# Patient Record
Sex: MALE | Race: WHITE | Employment: OTHER | URBAN - METROPOLITAN AREA
[De-identification: names, ages, dates, MRNs, and addresses within clinical notes are randomized per-mention and may not be internally consistent; named-entity substitution may affect disease eponyms.]

---

## 2017-03-22 ENCOUNTER — TELEPHONE (OUTPATIENT)
Dept: INTERNAL MEDICINE | Facility: OTHER | Age: 57
End: 2017-03-22

## 2017-03-22 NOTE — TELEPHONE ENCOUNTER
Left message to call back to see if he has a pcp somewhere else we need to get records from. Claudine De La Garza LPN

## 2017-03-24 ENCOUNTER — OFFICE VISIT (OUTPATIENT)
Dept: INTERNAL MEDICINE | Facility: OTHER | Age: 57
End: 2017-03-24
Attending: INTERNAL MEDICINE
Payer: MEDICARE

## 2017-03-24 VITALS
OXYGEN SATURATION: 98 % | HEIGHT: 68 IN | WEIGHT: 185 LBS | SYSTOLIC BLOOD PRESSURE: 120 MMHG | BODY MASS INDEX: 28.04 KG/M2 | HEART RATE: 70 BPM | TEMPERATURE: 97.4 F | DIASTOLIC BLOOD PRESSURE: 84 MMHG

## 2017-03-24 DIAGNOSIS — G89.29 CHRONIC RIGHT SHOULDER PAIN: Primary | ICD-10-CM

## 2017-03-24 DIAGNOSIS — M25.511 CHRONIC RIGHT SHOULDER PAIN: Primary | ICD-10-CM

## 2017-03-24 DIAGNOSIS — G89.29 CHRONIC BILATERAL BACK PAIN, UNSPECIFIED BACK LOCATION: ICD-10-CM

## 2017-03-24 DIAGNOSIS — M54.9 CHRONIC BILATERAL BACK PAIN, UNSPECIFIED BACK LOCATION: ICD-10-CM

## 2017-03-24 DIAGNOSIS — R69 DIAGNOSIS UNKNOWN: ICD-10-CM

## 2017-03-24 PROCEDURE — 99203 OFFICE O/P NEW LOW 30 MIN: CPT | Performed by: INTERNAL MEDICINE

## 2017-03-24 PROCEDURE — 99213 OFFICE O/P EST LOW 20 MIN: CPT

## 2017-03-24 ASSESSMENT — ANXIETY QUESTIONNAIRES
6. BECOMING EASILY ANNOYED OR IRRITABLE: NOT AT ALL
3. WORRYING TOO MUCH ABOUT DIFFERENT THINGS: MORE THAN HALF THE DAYS
1. FEELING NERVOUS, ANXIOUS, OR ON EDGE: SEVERAL DAYS
GAD7 TOTAL SCORE: 5
5. BEING SO RESTLESS THAT IT IS HARD TO SIT STILL: NOT AT ALL
7. FEELING AFRAID AS IF SOMETHING AWFUL MIGHT HAPPEN: NOT AT ALL
IF YOU CHECKED OFF ANY PROBLEMS ON THIS QUESTIONNAIRE, HOW DIFFICULT HAVE THESE PROBLEMS MADE IT FOR YOU TO DO YOUR WORK, TAKE CARE OF THINGS AT HOME, OR GET ALONG WITH OTHER PEOPLE: NOT DIFFICULT AT ALL
2. NOT BEING ABLE TO STOP OR CONTROL WORRYING: NOT AT ALL

## 2017-03-24 ASSESSMENT — PAIN SCALES - GENERAL: PAINLEVEL: MODERATE PAIN (4)

## 2017-03-24 ASSESSMENT — PATIENT HEALTH QUESTIONNAIRE - PHQ9: 5. POOR APPETITE OR OVEREATING: MORE THAN HALF THE DAYS

## 2017-03-24 NOTE — MR AVS SNAPSHOT
After Visit Summary   3/24/2017    Ramo Berry    MRN: [id number]           Patient Information     Date Of Birth          1960        Visit Information        Provider Department      3/24/2017 9:15 AM Koffi Jean-Baptiste, DO [facility]        Today's Diagnoses     Chronic right shoulder pain    -  1    Diagnosis unknown           Follow-ups after your visit        Additional Services     ORTHOPEDICS ADULT REFERRAL       Your provider has referred you to: Right shoulder pain   Second opinion      Please be aware that coverage of these services is subject to the terms and limitations of your health insurance plan.  Call member services at your health plan with any benefit or coverage questions.      Please bring the following to your appointment:    >>   Any x-rays, CTs or MRIs which have been performed.  Contact the facility where they were done to arrange for  prior to your scheduled appointment.    >>   List of current medications   >>   This referral request   >>   Any documents/labs given to you for this referral                  Who to contact     If you have questions or need follow up information about today's clinic visit or your schedule please contact [facility] directly at [phone].  Normal or non-critical lab and imaging results will be communicated to you by [employer], letter or phone within 4 business days after the clinic has received the results. If you do not hear from us within 7 days, please contact the clinic through [employer] or phone. If you have a critical or abnormal lab result, we will notify you by phone as soon as possible.  Submit refill requests through [employer] or call your pharmacy and they will forward the refill request to us. Please allow 3 business days for your refill to be completed.          Additional Information About Your Visit        [employer] Information     [employer] lets you send messages to your doctor, view your  "test results, renew your prescriptions, schedule appointments and more. To sign up, go to [email or web address] . Click on \"Log in\" on the left side of the screen, which will take you to the Welcome page. Then click on \"Sign up Now\" on the right side of the page.     You will be asked to enter the access code listed below, as well as some personal information. Please follow the directions to create your username and password.     Your access code is: [email or web address]  Expires: 2017  9:21 AM     Your access code will  in 90 days. If you need help or a new code, please call your [place] clinic or [phone].        Care EveryWhere ID     This is your Care EveryWhere ID. This could be used by other organizations to access your [place] medical records  [id number]        Your Vitals Were     Pulse Temperature Height Pulse Oximetry BMI (Body Mass Index)       70 97.4  F (36.3  C) (Tympanic) 5' 8\" (1.727 m) 98% 28.13 kg/m2        Blood Pressure from Last 3 Encounters:   17 120/84   16 170/90   16 137/75    Weight from Last 3 Encounters:   17 185 lb (83.9 kg)   16 175 lb (79.4 kg)   16 170 lb (77.1 kg)              We Performed the Following     ORTHOPEDICS ADULT REFERRAL          Today's Medication Changes          These changes are accurate as of: 3/24/17  9:22 AM.  If you have any questions, ask your nurse or doctor.               These medicines have changed or have updated prescriptions.        Dose/Directions    omeprazole 20 MG CR capsule   Commonly known as:  priLOSEC   This may have changed:  See the new instructions.   Used for:  Diagnosis unknown   Changed by:  Koffi Jean-Baptiste, DO        TAKE ONE CAPSULE BY MOUTH EVERY DAY BEFORE A MEAL.   Quantity:  90 capsule   Refills:  3            Where to get your medicines      These medications were sent to [id number] - [place], [place] - [address]  [id number] , [place] [address]     Phone:  " [phone]     omeprazole 20 MG CR capsule                Primary Care Provider    None       No address on file        Thank you!     Thank you for choosing [facility]  for your care. Our goal is always to provide you with excellent care. Hearing back from our patients is one way we can continue to improve our services. Please take a few minutes to complete the written survey that you may receive in the mail after your visit with us. Thank you!             Your Updated Medication List - Protect others around you: Learn how to safely use, store and throw away your medicines at www.disposemymeds.org.          This list is accurate as of: 3/24/17  9:22 AM.  Always use your most recent med list.                   Brand Name Dispense Instructions for use    AMLODIPINE BESYLATE PO      Take 5 mg by mouth 2 times daily       aspirin 325 MG tablet      Take 1 tablet by mouth every 4 hours as needed. For pain       CHLORTHALIDONE PO      Take 12.5 mg by mouth daily       lisinopril 40 MG tablet    PRINIVIL/ZESTRIL     Take 1 tablet by mouth daily.       METOPROLOL TARTRATE PO      Take 50 mg by mouth 2 times daily       omeprazole 20 MG CR capsule    priLOSEC    90 capsule    TAKE ONE CAPSULE BY MOUTH EVERY DAY BEFORE A MEAL.

## 2017-03-24 NOTE — PROGRESS NOTES
SUBJECTIVE:  Ramo Berry is a 57 year old male with pmh of HTN and chronic pain who presents today for establishment of care.  Patient reports ongoing right shoulder pain following right shoulder surgery and ongoing low back pain due to his neuro-stimulotor.  He also would like to see orthopedics for a second opinion due to ongoing pain in the right shoulder.  Patient was informed that I would not manage chronic pain with narcotics and the fact that his Marijuana use in the past would further complicate his situation moving forward.  We discussed [facility] pain management program which he declined today.  Patient voiced frustration as he has not found a provider since leaving [facility] who would prescribe medications he feels he truly needs.        Past Medical History:   Diagnosis Date     Anxiety state, unspecified 09/20/2012     Back Pain 09/20/2012     Cervical disc disease 09/20/2012     Chronic pain syndrome 09/20/2012     Essential hypertension, benign 09/20/2012         Past Surgical History:   Procedure Laterality Date     C6 C7 cervical fusion       cataract extraction and lens implantation      Bilateral      cystoscopy with biopsies      hematuria     fusion L5 S1       L4 L5 spinal fusion       MOUTH SURGERY       ORTHOPEDIC SURGERY Right 08/09/2016    rotator cuff surgery         No Known Allergies    Medications:  Current Outpatient Prescriptions   Medication Sig Dispense Refill     omeprazole (PRILOSEC) 20 MG CR capsule TAKE ONE CAPSULE BY MOUTH EVERY DAY BEFORE A MEAL. 90 capsule 3     METOPROLOL TARTRATE PO Take 50 mg by mouth 2 times daily       CHLORTHALIDONE PO Take 12.5 mg by mouth daily       AMLODIPINE BESYLATE PO Take 5 mg by mouth 2 times daily       aspirin 325 MG tablet Take 1 tablet by mouth every 4 hours as needed. For pain       lisinopril (PRINIVIL,ZESTRIL) 40 MG tablet Take 1 tablet by mouth daily.       [DISCONTINUED] omeprazole (PRILOSEC) 20 MG capsule TAKE ONE CAPSULE BY MOUTH  "EVERY DAY BEFORE A MEAL. (Patient taking differently: TAKE TWO CAPSULE BY MOUTH EVERY DAY BEFORE A MEAL.) 30 capsule 0       Family History   Problem Relation Age of Onset     Other - See Comments Father      back surgery      CEREBROVASCULAR DISEASE Father      CVA (cause of death)     Other - See Comments Sister      back problems     CANCER Other      Maternal side     DIABETES Other      Other - See Comments Sister      lumbar fusion       Social History   Substance Use Topics     Smoking status: Current Every Day Smoker     Packs/day: 1.00     Years: 35.00     Types: Cigarettes     Smokeless tobacco: Not on file      Comment: Tried to Quit: Yes; Passive Exposure: Yes; Longest Tobacco Free: 2 years      Alcohol use No      Comment: Rarely      Social History     Social History Narrative        Review Of Systems  ROS not completed.      OBJECTIVE:  /84 (BP Location: Right arm, Patient Position: Supine, Cuff Size: Adult Large)  Pulse 70  Temp 97.4  F (36.3  C) (Tympanic)  Ht 5' 8\" (1.727 m)  Wt 185 lb (83.9 kg)  SpO2 98%  BMI 28.13 kg/m2  Body mass index is 28.13 kg/(m^2).   NO EXAM COMPLETED TODAY  Psych:  Patient was cooperative but disappointed in that I would not prescribe pain medications today.       ASSESSMENT/PLAN:  Pt is a 57 year old male here to establish care    Ramo was seen today for establish care and *_* health care directive *_*.    Diagnoses and all orders for this visit:    Chronic right shoulder pain  -     Cancel: XR Shoulder Right 2 Views; Future  -     Cancel: XR Shoulder Right 2 Views  -     ORTHOPEDICS ADULT REFERRAL:  Patient requests a second opinion regarding previous right shoulder surgery as has ongoing right shoulder pain.      Diagnosis unknown  -     omeprazole (PRILOSEC) 20 MG CR capsule; TAKE ONE CAPSULE BY MOUTH EVERY DAY BEFORE A MEAL.    Chronic bilateral back pain, unspecified back location    I informed the patient today that I would see him for medical concerns " but I am not willing to prescribe narcotics.  He states he is uncertain if he will follow with me.  He requested Orthopedics referral and refill of Omeprazole as above.        Return to clinic per patient.      30 minutes of time was spent reviewing this patients records, coordinating care and discussing with the patient today.  No exam was completed, referrals were scheduled and Omeprazole was refilled today.      Koffi Jean-Baptiste D.O.

## 2017-03-25 ASSESSMENT — PATIENT HEALTH QUESTIONNAIRE - PHQ9: SUM OF ALL RESPONSES TO PHQ QUESTIONS 1-9: 5

## 2017-03-25 ASSESSMENT — ANXIETY QUESTIONNAIRES: GAD7 TOTAL SCORE: 5

## 2017-03-27 DIAGNOSIS — M25.511 ACUTE PAIN OF RIGHT SHOULDER: Primary | ICD-10-CM

## 2017-03-30 ENCOUNTER — OFFICE VISIT (OUTPATIENT)
Dept: ORTHOPEDICS | Facility: OTHER | Age: 57
End: 2017-03-30
Attending: ORTHOPAEDIC SURGERY
Payer: MEDICARE

## 2017-03-30 ENCOUNTER — APPOINTMENT (OUTPATIENT)
Dept: GENERAL RADIOLOGY | Facility: OTHER | Age: 57
End: 2017-03-30
Attending: ORTHOPAEDIC SURGERY
Payer: MEDICARE

## 2017-03-30 VITALS
HEIGHT: 70 IN | OXYGEN SATURATION: 97 % | TEMPERATURE: 97.7 F | SYSTOLIC BLOOD PRESSURE: 150 MMHG | WEIGHT: 193 LBS | HEART RATE: 73 BPM | DIASTOLIC BLOOD PRESSURE: 98 MMHG | BODY MASS INDEX: 27.63 KG/M2

## 2017-03-30 DIAGNOSIS — M25.511 CHRONIC RIGHT SHOULDER PAIN: ICD-10-CM

## 2017-03-30 DIAGNOSIS — G89.29 CHRONIC RIGHT SHOULDER PAIN: ICD-10-CM

## 2017-03-30 PROCEDURE — 73030 X-RAY EXAM OF SHOULDER: CPT | Mod: TC,RT

## 2017-03-30 PROCEDURE — 99203 OFFICE O/P NEW LOW 30 MIN: CPT | Performed by: ORTHOPAEDIC SURGERY

## 2017-03-30 PROCEDURE — 72050 X-RAY EXAM NECK SPINE 4/5VWS: CPT | Mod: TC

## 2017-03-30 PROCEDURE — 99212 OFFICE O/P EST SF 10 MIN: CPT

## 2017-03-30 RX ORDER — ACETAMINOPHEN 500 MG
500 TABLET ORAL
COMMUNITY
Start: 2016-03-08 | End: 2017-05-08

## 2017-03-30 NOTE — NURSING NOTE
"No chief complaint on file.      Initial BP (!) 150/98 (BP Location: Left arm, Cuff Size: Adult Regular)  Pulse 73  Temp 97.7  F (36.5  C) (Tympanic)  Ht 5' 10\" (1.778 m)  Wt 193 lb (87.5 kg)  SpO2 97%  BMI 27.69 kg/m2 Estimated body mass index is 27.69 kg/(m^2) as calculated from the following:    Height as of this encounter: 5' 10\" (1.778 m).    Weight as of this encounter: 193 lb (87.5 kg).  Medication Reconciliation: complete    "

## 2017-03-30 NOTE — PROGRESS NOTES
SUBJECTIVE:                                                    Ramo Berry is a 57 year old male who presents to clinic today for the following health issues:      Musculoskeletal problem/pain      Duration: 1 year    Description  Location: Right shoulder    Intensity:  moderate, 5/10    Accompanying signs and symptoms: radiation of pain to hand and weakness of arm/hand    History  Previous similar problem: no   Previous evaluation:  MRI and Surgery last year    Precipitating or alleviating factors:  Trauma or overuse: no   Aggravating factors include: using it    Therapies tried and outcome: rest/inactivity, ice, stretching, exercises and NSAID - Aspirin               Problem list and histories reviewed & adjusted, as indicated.  Additional history: none    Patient Active Problem List   Diagnosis     Advanced care planning/counseling discussion     Past Surgical History:   Procedure Laterality Date     C6 C7 cervical fusion       cataract extraction and lens implantation      Bilateral      cystoscopy with biopsies      hematuria     fusion L5 S1       L4 L5 spinal fusion       MOUTH SURGERY       ORTHOPEDIC SURGERY Right 08/09/2016    rotator cuff surgery        Social History   Substance Use Topics     Smoking status: Current Every Day Smoker     Packs/day: 1.00     Years: 35.00     Types: Cigarettes     Smokeless tobacco: Not on file      Comment: Tried to Quit: Yes; Passive Exposure: Yes; Longest Tobacco Free: 2 years      Alcohol use No      Comment: Rarely      Family History   Problem Relation Age of Onset     Other - See Comments Father      back surgery      CEREBROVASCULAR DISEASE Father      CVA (cause of death)     Other - See Comments Sister      back problems     CANCER Other      Maternal side     DIABETES Other      Other - See Comments Sister      lumbar fusion         Current Outpatient Prescriptions   Medication Sig Dispense Refill     acetaminophen (TYLENOL) 500 MG tablet Take 500 mg by  mouth       omeprazole (PRILOSEC) 20 MG CR capsule TAKE ONE CAPSULE BY MOUTH EVERY DAY BEFORE A MEAL. 90 capsule 3     METOPROLOL TARTRATE PO Take 50 mg by mouth 2 times daily       CHLORTHALIDONE PO Take 12.5 mg by mouth daily       AMLODIPINE BESYLATE PO Take 5 mg by mouth 2 times daily       aspirin 325 MG tablet Take 1 tablet by mouth every 4 hours as needed. For pain       lisinopril (PRINIVIL,ZESTRIL) 40 MG tablet Take 1 tablet by mouth daily.       No Known Allergies    Reviewed and updated as needed this visit by clinical staff  Tobacco  Allergies  Meds       Reviewed and updated as needed this visit by Provider           Chief complaint: Pain in the right shoulder that radiates down into the upper arm and forearm.    History chief complaint: Patient's had a long history of pain in the right upper extremity.  Approximate 4 years ago he underwent a C6-C7 fusion procedure through an anterior approach for neck pain and numbness and weakness in the right upper extremity.  The procedure largely relieved his symptoms but not completely.  Proximally a year ago he again experiencing pain in the anterior aspect of the upper arm and shoulder as well as having radicular symptoms along the posterior aspect of his arm and down into the radial aspect of the forearm.  The pain was aggravated by any significant physical exertion with that right arm.  [facility] who felt that his symptoms were largely related to shoulder pathology, obtain the MRI in June 2016, that demonstrated widening of the rotator interval and partial tearing of the long head of the biceps tendon.  He underwent operative procedure in August 2 repair the rotator interval and to repair the biceps origin.  The patient states he never had relief of his discomfort and did not feel he was getting his concerns are addressed and decided to seek second opinion.  He presents today for orthopedic evaluation.    Past medical  history: Significant for hypertension and heartburn.    Past surgical history: L4-5 fusion, with revision surgery performed approximately 2 years later.  C6-7 fusion.  Right shoulder arthroscopically assisted rotator interval repair with biceps origin repair.    Social history: Lives locally, is retired, admits to 1 pack per day smoking, and occasional use of cannabis    Family history family history is reviewed and included in his Epic chart and includes cardiovascular disease, cancer, and diabetes    Review of systems: General no fever chills weight loss or constitutional symptoms    HEENT: Neck pain with range of motion otherwise negative    Chest: Negative    Cardiovascular: No recent changes    Abdomen: No change in bowel or bladder habits    : Negative    Neurologic: Numbness and tingling in the right upper arm over the deltoid triceps and extensor area of the forearm    Orthopedic: Chronic shoulder and right arm pain and chronic low back pain without radiculopathy    Physical exam: Apparent that the patient is a alert and oriented adult male that appears in no apparent distress.  He does appear tired    HEENT: Global decrease in range of motion of the cervical spine by approximately 10  in each motion plane, negative Spurling's exam    Chest: Normal excursion mild tenderness over the upper thoracic spine    Cardiovascular: No murmurs, regular rate and rhythm, no venous distention    Abdomen: Soft and nontender no organomegaly    Musculoskeletal: Sensation is diminished in the C5-C6 dermatome of the right upper extremity but normal in the left.  Patient has a full active range of motion of both left and right shoulder, elbow, wrist and small joints of the hand.  Sensation is intact in all these areas.  Motor strength is grade 5 out of 5 in all planes in all muscle groups in the left upper extremity.  Patient has grade 4+ strength in the right upper extremity in external rotation and forward flexion above 70   otherwise 5 over 5 in all planes.  Reflexes are normal and equal bilaterally.  Examination of lumbar spine demonstrates a well-healed deep surgical scar with the palpable object just to the right of the scar which is most likely his bone stimulator implanted at the time of his last surgery.  He has normal strength in both legs in the quadriceps, hamstrings, calves and anterior compartment muscles.  Foot flexion and extension are normal he walks with a normal gait has normal sensation in his feet and legs.    X-rays and MRI.  Cervical spine series and shoulder series were obtained today cervical spine series demonstrates fusion at C6-7 that appears to be complete in stable there is slight diminished space in the neural foramen on the left compared to the right CERVICAL disc spaces are diminished.    Right shoulder series appears essentially normal.    MRI from June 2016 is reviewed and this demonstrates widening of the rotator interval flattening of the biceps tendon fluid around the biceps tendon consistent with his preoperative diagnosis    Assessment and plan: The assessment is that he has a mixed component causing pain in his right upper extremity.  I believe some of this is coming from his shoulder as well as from the cervical spine a lengthy discussion was held regarding natural history of spinal fusions and the potential for additional degenerative disc disease at the segment above and below a previous fusion, as well as a strong family history of spondyloarthropathy.  He did have an MRI obtained of his right shoulder performed at [facility] on December 16 of 2016 and we are going to request a copy of this disc.  To review at his next visit, he will follow-up as soon as this is obtained.

## 2017-03-30 NOTE — MR AVS SNAPSHOT
"              After Visit Summary   3/30/2017    Ramo Berry    MRN: [id number]           Patient Information     Date Of Birth          1960        Visit Information        Provider Department      3/30/2017 10:45 AM Talha Farrell, DO  ORTHOPEDICS        Today's Diagnoses     Chronic right shoulder pain           Follow-ups after your visit        Who to contact     If you have questions or need follow up information about today's clinic visit or your schedule please contact  ORTHOPEDICS directly at [phone].  Normal or non-critical lab and imaging results will be communicated to you by MyChart, letter or phone within 4 business days after the clinic has received the results. If you do not hear from us within 7 days, please contact the clinic through [employer] or phone. If you have a critical or abnormal lab result, we will notify you by phone as soon as possible.  Submit refill requests through [employer] or call your pharmacy and they will forward the refill request to us. Please allow 3 business days for your refill to be completed.          Additional Information About Your Visit        MyChart Information     [employer] lets you send messages to your doctor, view your test results, renew your prescriptions, schedule appointments and more. To sign up, go to [email or web address] . Click on \"Log in\" on the left side of the screen, which will take you to the Welcome page. Then click on \"Sign up Now\" on the right side of the page.     You will be asked to enter the access code listed below, as well as some personal information. Please follow the directions to create your username and password.     Your access code is: [email or web address]  Expires: 2017  9:21 AM     Your access code will  in 90 days. If you need help or a new code, please call your [place] clinic or [phone].        Care EveryWhere ID     This is your Care EveryWhere ID. This could be used by other organizations to access your " "[place] medical records  [id number]        Your Vitals Were     Pulse Temperature Height Pulse Oximetry BMI (Body Mass Index)       73 97.7  F (36.5  C) (Tympanic) 5' 10\" (1.778 m) 97% 27.69 kg/m2        Blood Pressure from Last 3 Encounters:   03/30/17 (!) 150/98   03/24/17 120/84   12/07/16 170/90    Weight from Last 3 Encounters:   03/30/17 193 lb (87.5 kg)   03/24/17 185 lb (83.9 kg)   12/07/16 175 lb (79.4 kg)              We Performed the Following     XR CERVICAL SPINE G/E 4 VIEWS (Clinic Performed)        Primary Care Provider    None       No address on file        Thank you!     Thank you for choosing  ORTHOPEDICS  for your care. Our goal is always to provide you with excellent care. Hearing back from our patients is one way we can continue to improve our services. Please take a few minutes to complete the written survey that you may receive in the mail after your visit with us. Thank you!             Your Updated Medication List - Protect others around you: Learn how to safely use, store and throw away your medicines at www.disposemymeds.org.          This list is accurate as of: 3/30/17 11:54 AM.  Always use your most recent med list.                   Brand Name Dispense Instructions for use    acetaminophen 500 MG tablet    TYLENOL     Take 500 mg by mouth       AMLODIPINE BESYLATE PO      Take 5 mg by mouth 2 times daily       aspirin 325 MG tablet      Take 1 tablet by mouth every 4 hours as needed. For pain       CHLORTHALIDONE PO      Take 12.5 mg by mouth daily       lisinopril 40 MG tablet    PRINIVIL/ZESTRIL     Take 1 tablet by mouth daily.       METOPROLOL TARTRATE PO      Take 50 mg by mouth 2 times daily       omeprazole 20 MG CR capsule    priLOSEC    90 capsule    TAKE ONE CAPSULE BY MOUTH EVERY DAY BEFORE A MEAL.         "

## 2017-04-06 ENCOUNTER — OFFICE VISIT (OUTPATIENT)
Dept: ORTHOPEDICS | Facility: OTHER | Age: 57
End: 2017-04-06
Attending: ORTHOPAEDIC SURGERY
Payer: MEDICARE

## 2017-04-06 VITALS
DIASTOLIC BLOOD PRESSURE: 68 MMHG | TEMPERATURE: 97.6 F | SYSTOLIC BLOOD PRESSURE: 138 MMHG | WEIGHT: 185 LBS | OXYGEN SATURATION: 98 % | HEIGHT: 70 IN | RESPIRATION RATE: 18 BRPM | BODY MASS INDEX: 26.48 KG/M2 | HEART RATE: 54 BPM

## 2017-04-06 DIAGNOSIS — G89.29 CHRONIC RIGHT SHOULDER PAIN: Primary | ICD-10-CM

## 2017-04-06 DIAGNOSIS — M25.511 CHRONIC RIGHT SHOULDER PAIN: Primary | ICD-10-CM

## 2017-04-06 PROCEDURE — 99212 OFFICE O/P EST SF 10 MIN: CPT

## 2017-04-06 PROCEDURE — 99212 OFFICE O/P EST SF 10 MIN: CPT | Performed by: ORTHOPAEDIC SURGERY

## 2017-04-06 ASSESSMENT — PAIN SCALES - GENERAL
PAINLEVEL: MODERATE PAIN (4)
PAINLEVEL: MODERATE PAIN (5)

## 2017-04-06 NOTE — NURSING NOTE
"Chief Complaint   Patient presents with     RECHECK     Right shoulder follow up. Waiting on disk.       Initial /86 (BP Location: Left arm, Patient Position: Chair, Cuff Size: Adult Regular)  Pulse 63  Temp 97.6  F (36.4  C) (Tympanic)  Ht 5' 10\" (1.778 m)  Wt 185 lb (83.9 kg)  SpO2 95%  BMI 26.54 kg/m2 Estimated body mass index is 26.54 kg/(m^2) as calculated from the following:    Height as of this encounter: 5' 10\" (1.778 m).    Weight as of this encounter: 185 lb (83.9 kg).  Medication Reconciliation: complete   Kaylee Vizcarra LPN      "

## 2017-04-06 NOTE — PROGRESS NOTES
"Patient presents today for review of an MRI that was obtained in [facility] orthopedic clinic on 16th of December 2016.  The patient's shoulder remains painful, and weak, and difficult for him to perform daily tasks with.    MRI review: MRI review condensed demonstrates considerable amount of fluid in the glenohumeral joint, there appears to be at least a partial tear of the subscapularis tendon, biceps tendon is markedly attenuated and the rotator cuff interval remains quite widened.  The acromioclavicular joint and the undersurface of the acromion had been resected.    Assessment and plan I reviewed the MRI with the patient, we believe it would be in his best interest to have the arthroscopic exam of the shoulder and repair the torn tendons and most likely perform a biceps tenodesis and rotator interval should also be closed.  Technical aspects of procedure usually expected recovery time potential complications, and activity level after the surgery were discussed he accepts the risks and desires of procedure be performed surgery date was selected./68 (BP Location: Left arm, Patient Position: Chair, Cuff Size: Adult Regular)  Pulse 54  Temp 97.6  F (36.4  C)  Resp 18  Ht 5' 10\" (1.778 m)  Wt 185 lb (83.9 kg)  SpO2 98%  BMI 26.54 kg/m2  "

## 2017-04-06 NOTE — MR AVS SNAPSHOT
"              After Visit Summary   4/6/2017    Ramo Berry    MRN: [id number]           Patient Information     Date Of Birth          1960        Visit Information        Provider Department      4/6/2017 3:15 PM Talha Farrell DO  ORTHOPEDICS         Follow-ups after your visit        Your next 10 appointments already scheduled     Apr 12, 2017 10:30 AM CDT   (Arrive by 10:15 AM)   Pre-Op physical with TAVIA Wake[place]   [facility] [place] ([facility] [place] [facility])    [address]  [place] [id number] [phone]            May 08, 2017 10:30 AM CDT   (Arrive by 10:15 AM)   Post Op with Talha Farrell DO    ORTHOPEDICS ([facility] [place] [facility])    [address]  [place] [id number] [phone]              Who to contact     If you have questions or need follow up information about today's clinic visit or your schedule please contact  ORTHOPEDICS directly at [phone].  Normal or non-critical lab and imaging results will be communicated to you by TapInkohart, letter or phone within 4 business days after the clinic has received the results. If you do not hear from us within 7 days, please contact the clinic through 3Jamt or phone. If you have a critical or abnormal lab result, we will notify you by phone as soon as possible.  Submit refill requests through [employer] or call your pharmacy and they will forward the refill request to us. Please allow 3 business days for your refill to be completed.          Additional Information About Your Visit        TapInkoharEmpire [employer] Information     [employer] lets you send messages to your doctor, view your test results, renew your prescriptions, schedule appointments and more. To sign up, go to [email or web address] . Click on \"Log in\" on the left side of the screen, which will take you to the Welcome page. Then click on \"Sign up Now\" on the right side of the page.     You will be asked to enter the access code listed below, as well as some personal " "information. Please follow the directions to create your username and password.     Your access code is: [email or web address]  Expires: 2017  9:21 AM     Your access code will  in 90 days. If you need help or a new code, please call your [facility] or [phone].        Care EveryWhere ID     This is your Care EveryWhere ID. This could be used by other organizations to access your [facility] medical records  [id number]        Your Vitals Were     Pulse Temperature Respirations Height Pulse Oximetry BMI (Body Mass Index)    54 97.6  F (36.4  C) 18 5' 10\" (1.778 m) 98% 26.54 kg/m2       Blood Pressure from Last 3 Encounters:   17 138/68   17 (!) 150/98   17 120/84    Weight from Last 3 Encounters:   17 185 lb (83.9 kg)   17 193 lb (87.5 kg)   17 185 lb (83.9 kg)              Today, you had the following     No orders found for display       Primary Care Provider    None       No address on file        Thank you!     Thank you for choosing  ORTHOPEDICS  for your care. Our goal is always to provide you with excellent care. Hearing back from our patients is one way we can continue to improve our services. Please take a few minutes to complete the written survey that you may receive in the mail after your visit with us. Thank you!             Your Updated Medication List - Protect others around you: Learn how to safely use, store and throw away your medicines at www.disposemymeds.org.          This list is accurate as of: 17  3:51 PM.  Always use your most recent med list.                   Brand Name Dispense Instructions for use    acetaminophen 500 MG tablet    TYLENOL     Take 500 mg by mouth       AMLODIPINE BESYLATE PO      Take 5 mg by mouth 2 times daily       aspirin 325 MG tablet      Take 1 tablet by mouth every 4 hours as needed. For pain       CHLORTHALIDONE PO      Take 12.5 mg by mouth daily       lisinopril 40 MG tablet    PRINIVIL/ZESTRIL     Take 1 " tablet by mouth daily.       METOPROLOL TARTRATE PO      Take 50 mg by mouth 2 times daily       omeprazole 20 MG CR capsule    priLOSEC    90 capsule    TAKE ONE CAPSULE BY MOUTH EVERY DAY BEFORE A MEAL.

## 2017-04-07 DIAGNOSIS — G89.29 CHRONIC RIGHT SHOULDER PAIN: Primary | ICD-10-CM

## 2017-04-07 DIAGNOSIS — M25.511 CHRONIC RIGHT SHOULDER PAIN: Primary | ICD-10-CM

## 2017-04-10 ENCOUNTER — TELEPHONE (OUTPATIENT)
Dept: ORTHOPEDICS | Facility: OTHER | Age: 57
End: 2017-04-10

## 2017-04-10 NOTE — TELEPHONE ENCOUNTER
Pt requesting pain medication.  Talked with Dr Farrell and no pain medication will be given at this time he will have pain medication after surgery, notified can take tylenol and ice for comport.   Pamela M Lechevalier LPN

## 2017-04-12 ENCOUNTER — OFFICE VISIT (OUTPATIENT)
Dept: FAMILY MEDICINE | Facility: OTHER | Age: 57
End: 2017-04-12
Attending: PHYSICIAN ASSISTANT
Payer: MEDICARE

## 2017-04-12 VITALS
HEIGHT: 68 IN | BODY MASS INDEX: 28.49 KG/M2 | SYSTOLIC BLOOD PRESSURE: 136 MMHG | HEART RATE: 58 BPM | DIASTOLIC BLOOD PRESSURE: 80 MMHG | TEMPERATURE: 96.4 F | OXYGEN SATURATION: 98 % | WEIGHT: 188 LBS

## 2017-04-12 DIAGNOSIS — I10 BENIGN ESSENTIAL HYPERTENSION: ICD-10-CM

## 2017-04-12 DIAGNOSIS — Z01.818 PREOP GENERAL PHYSICAL EXAM: Primary | ICD-10-CM

## 2017-04-12 LAB
ANION GAP SERPL CALCULATED.3IONS-SCNC: 6 MMOL/L (ref 3–14)
BASOPHILS # BLD AUTO: 0.1 10E9/L (ref 0–0.2)
BASOPHILS NFR BLD AUTO: 1 %
BUN SERPL-MCNC: 16 MG/DL (ref 7–30)
CALCIUM SERPL-MCNC: 8.6 MG/DL (ref 8.5–10.1)
CHLORIDE SERPL-SCNC: 110 MMOL/L (ref 94–109)
CO2 SERPL-SCNC: 25 MMOL/L (ref 20–32)
CREAT SERPL-MCNC: 1.33 MG/DL (ref 0.66–1.25)
DIFFERENTIAL METHOD BLD: ABNORMAL
EOSINOPHIL # BLD AUTO: 0.4 10E9/L (ref 0–0.7)
EOSINOPHIL NFR BLD AUTO: 5.3 %
ERYTHROCYTE [DISTWIDTH] IN BLOOD BY AUTOMATED COUNT: 16.4 % (ref 10–15)
GFR SERPL CREATININE-BSD FRML MDRD: 56 ML/MIN/1.7M2
GLUCOSE SERPL-MCNC: 84 MG/DL (ref 70–99)
HCT VFR BLD AUTO: 42.8 % (ref 40–53)
HGB BLD-MCNC: 13.9 G/DL (ref 13.3–17.7)
IMM GRANULOCYTES # BLD: 0 10E9/L (ref 0–0.4)
IMM GRANULOCYTES NFR BLD: 0.4 %
LYMPHOCYTES # BLD AUTO: 1.5 10E9/L (ref 0.8–5.3)
LYMPHOCYTES NFR BLD AUTO: 21 %
MCH RBC QN AUTO: 27.6 PG (ref 26.5–33)
MCHC RBC AUTO-ENTMCNC: 32.5 G/DL (ref 31.5–36.5)
MCV RBC AUTO: 85 FL (ref 78–100)
MONOCYTES # BLD AUTO: 0.7 10E9/L (ref 0–1.3)
MONOCYTES NFR BLD AUTO: 9.1 %
NEUTROPHILS # BLD AUTO: 4.5 10E9/L (ref 1.6–8.3)
NEUTROPHILS NFR BLD AUTO: 63.2 %
NRBC # BLD AUTO: 0 10*3/UL
NRBC BLD AUTO-RTO: 0 /100
PLATELET # BLD AUTO: 300 10E9/L (ref 150–450)
POTASSIUM SERPL-SCNC: 4.4 MMOL/L (ref 3.4–5.3)
RBC # BLD AUTO: 5.04 10E12/L (ref 4.4–5.9)
SODIUM SERPL-SCNC: 141 MMOL/L (ref 133–144)
WBC # BLD AUTO: 7.1 10E9/L (ref 4–11)

## 2017-04-12 PROCEDURE — 85025 COMPLETE CBC W/AUTO DIFF WBC: CPT | Performed by: PHYSICIAN ASSISTANT

## 2017-04-12 PROCEDURE — 99213 OFFICE O/P EST LOW 20 MIN: CPT

## 2017-04-12 PROCEDURE — 99214 OFFICE O/P EST MOD 30 MIN: CPT | Performed by: PHYSICIAN ASSISTANT

## 2017-04-12 PROCEDURE — 80048 BASIC METABOLIC PNL TOTAL CA: CPT | Performed by: PHYSICIAN ASSISTANT

## 2017-04-12 PROCEDURE — 36415 COLL VENOUS BLD VENIPUNCTURE: CPT | Performed by: PHYSICIAN ASSISTANT

## 2017-04-12 ASSESSMENT — PAIN SCALES - GENERAL: PAINLEVEL: MODERATE PAIN (4)

## 2017-04-12 NOTE — PROGRESS NOTES
[facility] [place]  [address] [place] [address]  [phone]  Dept: [phone]    PRE-OP EVALUATION:  Today's date: 2017    Ramo Berry (: 1960) presents for pre-operative evaluation assessment as requested by Dr. Farrell .  He requires evaluation and anesthesia risk assessment prior to undergoing surgery/procedure for treatment of right rotator cuff repair  .  Proposed procedure: arthroscopy right shoulder rotator cuff repair     Date of Surgery/ Procedure: 17  Time of Surgery/ Procedure: [facility]   Hospital/Surgical Facility: [facility]   Primary Physician: None  Type of Anesthesia Anticipated: to be determined    Patient has a Health Care Directive or Living Will:  NO    1. NO - Do you have a history of heart attack, stroke, stent, bypass or surgery on an artery in the head, neck, heart or legs?  2. NO - Do you ever have any pain or discomfort in your chest?  3. NO - Do you have a history of  Heart Failure?  4. NO - Are you troubled by shortness of breath when: walking on the level, up a slight hill or at night?  5. NO - Do you currently have a cold, bronchitis or other respiratory infection?  6. NO - Do you have a cough, shortness of breath or wheezing?  7. NO - Do you sometimes get pains in the calves of your legs when you walk?  8. NO - Do you or anyone in your family have previous history of blood clots?  9. NO - Do you or does anyone in your family have a serious bleeding problem such as prolonged bleeding following surgeries or cuts?  10. YES - Have you ever had problems with anemia or been told to take iron pills?  11. NO - Have you had any abnormal blood loss such as black, tarry or bloody stools, or abnormal vaginal bleeding?  12. YES - Have you ever had a blood transfusion?  13. NO - Have you or any of your relatives ever had problems with anesthesia?  14. NO - Do you have sleep apnea, excessive snoring or daytime drowsiness?  15. NO - Do you have any prosthetic heart  valves?  16. NO - Do you have prosthetic joints?  17. NO - Is there any chance that you may be pregnant?      HPI:                                                      Brief HPI related to upcoming procedure: has shoulder repair of right  rotator cuff approximately 9 months ago. Continues with pain and limited movement.  No strength in his right shoulder.       See problem list for active medical problems.  Problems all longstanding and stable, except as noted/documented.  See ROS for pertinent symptoms related to these conditions.                                                                                                  .    MEDICAL HISTORY:                                                      Patient Active Problem List    Diagnosis Date Noted     Advanced care planning/counseling discussion 09/20/2012     Priority: Medium      Past Medical History:   Diagnosis Date     Anxiety state, unspecified 09/20/2012     Back Pain 09/20/2012     Cervical disc disease 09/20/2012     Chronic pain syndrome 09/20/2012     Essential hypertension, benign 09/20/2012     Past Surgical History:   Procedure Laterality Date     C6 C7 cervical fusion       cataract extraction and lens implantation      Bilateral      cystoscopy with biopsies      hematuria     fusion L5 S1       L4 L5 spinal fusion       MOUTH SURGERY       ORTHOPEDIC SURGERY Right 08/09/2016    rotator cuff surgery      Current Outpatient Prescriptions   Medication Sig Dispense Refill     acetaminophen (TYLENOL) 500 MG tablet Take 500 mg by mouth       omeprazole (PRILOSEC) 20 MG CR capsule TAKE ONE CAPSULE BY MOUTH EVERY DAY BEFORE A MEAL. 90 capsule 3     METOPROLOL TARTRATE PO Take 50 mg by mouth 2 times daily       CHLORTHALIDONE PO Take 12.5 mg by mouth daily       AMLODIPINE BESYLATE PO Take 5 mg by mouth 2 times daily       aspirin 325 MG tablet Take 1 tablet by mouth every 4 hours as needed. For pain       lisinopril (PRINIVIL,ZESTRIL) 40 MG tablet Take  "1 tablet by mouth daily.       OTC products: None, except as noted above    No Known Allergies   Latex Allergy: NO    Social History   Substance Use Topics     Smoking status: Current Every Day Smoker     Packs/day: 1.00     Years: 35.00     Types: Cigarettes     Smokeless tobacco: Not on file      Comment: Tried to Quit: Yes; Passive Exposure: Yes; Longest Tobacco Free: 2 years      Alcohol use No      Comment: Rarely      History   Drug Use No       REVIEW OF SYSTEMS:                                                    C: NEGATIVE for fever, chills, change in weight  I: NEGATIVE for worrisome rashes, moles or lesions  E: NEGATIVE for vision changes or irritation  E/M: NEGATIVE for ear, mouth and throat problems  R: NEGATIVE for significant cough or SOB  CV: NEGATIVE for chest pain, palpitations or peripheral edema  GI: NEGATIVE for nausea, abdominal pain, heartburn, or change in bowel habits  : NEGATIVE for frequency, dysuria, or hematuria  MUSCULOSKELETAL:see hpi  N: NEGATIVE for weakness, dizziness or paresthesias  E: NEGATIVE for temperature intolerance, skin/hair changes  H: NEGATIVE for bleeding problems  P: NEGATIVE for changes in mood or affect    EXAM:                                                    /80 (BP Location: Left arm, Patient Position: Chair, Cuff Size: Adult Large)  Pulse 58  Temp 96.4  F (35.8  C) (Tympanic)  Ht 5' 8.25\" (1.734 m)  Wt 188 lb (85.3 kg)  SpO2 98%  BMI 28.38 kg/m2    GENERAL APPEARANCE: healthy, alert and no distress     EYES: EOMI,  PERRL     HENT: ear canals and TM's normal and nose and mouth without ulcers or lesions     NECK: no adenopathy, no asymmetry, masses, or scars and thyroid normal to palpation     RESP: lungs clear to auscultation - no rales, rhonchi or wheezes     CV: regular rates and rhythm, normal S1 S2, no S3 or S4 and no murmur, click or rub     ABDOMEN:  soft, nontender, no HSM or masses and bowel sounds normal     MS: gait normal, normal muscle " tone and decreased range of motion above 90 degrees. Painful and does compensate.      SKIN: no suspicious lesions or rashes     NEURO: Normal strength and tone, sensory exam grossly normal, mentation intact and speech normal     PSYCH: mentation appears normal. and affect normal/bright     LYMPHATICS: No axillary, cervical, or supraclavicular nodes    DIAGNOSTICS:                                                      EKG: appears normal, NSR, sinus bradycardia, normal axis, normal intervals, no acute ST/T changes c/w ischemia, no LVH by voltage criteria  Serum Potassium  Serum Creatinine  Labs Drawn and in Process:   Unresulted Labs Ordered in the Past 30 Days of this Admission     No orders found from 2/11/2017 to 4/13/2017.          Recent Labs   Lab Test  08/05/16   1544  09/26/15   1344   HGB  13.5  7.3*   PLT  337  569*   NA  135  141   POTASSIUM  4.3  3.8   CR  1.46*  0.94        IMPRESSION:                                                    Reason for surgery/procedure: right shoulder arthroscopy.   Diagnosis/reason for consult: medical clearance.     The proposed surgical procedure is considered INTERMEDIATE risk.    REVISED CARDIAC RISK INDEX  The patient has the following serious cardiovascular risks for perioperative complications such as (MI, PE, VFib and 3  AV Block):  No serious cardiac risks  INTERPRETATION: 0 risks: Class I (very low risk - 0.4% complication rate)    The patient has the following additional risks for perioperative complications:  No identified additional risks        RECOMMENDATIONS:                                                        1. Preop general physical exam  He is optimized.  Has a failed shoulder surgery. Smoker without hx of COPD.     2. Benign essential hypertension  He has hx of this without follow up. Dr. Calabrese is primary MD.  Will get some labs today.  Good control.   - CBC with platelets differential  - Basic metabolic panel    --Patient is to take all  scheduled medications on the day of surgery EXCEPT for modifications listed below.    ACE Inhibitor or Angiotensin Receptor Blocker (ARB) Use  Ace inhibitor or Angiotensin Receptor Blocker (ARB) and should HOLD this medication for the 24 hours prior to surgery. Also hold chlorthalidone.       APPROVAL GIVEN to proceed with proposed procedure, without further diagnostic evaluation       Signed Electronically by: TAVIA Squires    Copy of this evaluation report is provided to requesting physician.    [facility] Preop Guidelines

## 2017-04-12 NOTE — NURSING NOTE
"Chief Complaint   Patient presents with     Pre-Op Exam     Pre-op exam for 4/21/17 with Dr. phillips at [facility] for Arthroscopy right shoulder rotator cuff repair        Initial /80 (BP Location: Left arm, Patient Position: Chair, Cuff Size: Adult Large)  Pulse 58  Temp 96.4  F (35.8  C) (Tympanic)  Ht 5' 8.25\" (1.734 m)  Wt 188 lb (85.3 kg)  SpO2 98%  BMI 28.38 kg/m2 Estimated body mass index is 28.38 kg/(m^2) as calculated from the following:    Height as of this encounter: 5' 8.25\" (1.734 m).    Weight as of this encounter: 188 lb (85.3 kg).  Medication Reconciliation: complete   Serena Guzman CMA(AAMA)   "

## 2017-04-12 NOTE — MR AVS SNAPSHOT
After Visit Summary   4/12/2017    Ramo Berry    MRN: [id number]           Patient Information     Date Of Birth          1960        Visit Information        Provider Department      4/12/2017 10:30 AM [facility]        Today's Diagnoses     Preop general physical exam    -  1    Benign essential hypertension          Care Instructions      Before Your Surgery      Call your surgeon if there is any change in your health. This includes signs of a cold or flu (such as a sore throat, runny nose, cough, rash or fever).    Do not smoke, drink alcohol or take over the counter medicine (unless your surgeon or primary care doctor tells you to) for the 24 hours before and after surgery.    If you take prescribed drugs: Follow your doctor s orders about which medicines to take and which to stop until after surgery.    Eating and drinking prior to surgery: follow the instructions from your surgeon    Take a shower or bath the night before surgery. Use the soap your surgeon gave you to gently clean your skin. If you do not have soap from your surgeon, use your regular soap. Do not shave or scrub the surgery site.  Wear clean pajamas and have clean sheets on your bed.         Follow-ups after your visit        Your next 10 appointments already scheduled     Apr 21, 2017   Procedure with Talha Farrell DO   [facility] ([facility] )    [address] [place] [phone]            May 08, 2017 10:30 AM CDT   (Arrive by 10:15 AM)   Post Op with Talha Farrell DO    ORTHOPEDICS ([facility])    [address] [place] [id number] [phone]              Who to contact     If you have questions or need follow up information about today's clinic visit or your schedule please contact [facility] directly at [phone].  Normal or non-critical lab and imaging results will be communicated to you by  "MyChart, letter or phone within 4 business days after the clinic has received the results. If you do not hear from us within 7 days, please contact the clinic through HeTextedhart or phone. If you have a critical or abnormal lab result, we will notify you by phone as soon as possible.  Submit refill requests through [employer] or call your pharmacy and they will forward the refill request to us. Please allow 3 business days for your refill to be completed.          Additional Information About Your Visit        [employer] Information     [employer] lets you send messages to your doctor, view your test results, renew your prescriptions, schedule appointments and more. To sign up, go to [email or web address] . Click on \"Log in\" on the left side of the screen, which will take you to the Welcome page. Then click on \"Sign up Now\" on the right side of the page.     You will be asked to enter the access code listed below, as well as some personal information. Please follow the directions to create your username and password.     Your access code is: [email or web address]  Expires: 2017  9:21 AM     Your access code will  in 90 days. If you need help or a new code, please call your [facility] clinic or [phone].        Care EveryWhere ID     This is your Care EveryWhere ID. This could be used by other organizations to access your [facility] medical records  [id number]        Your Vitals Were     Pulse Temperature Height Pulse Oximetry BMI (Body Mass Index)       58 96.4  F (35.8  C) (Tympanic) 5' 8.25\" (1.734 m) 98% 28.38 kg/m2        Blood Pressure from Last 3 Encounters:   17 136/80   17 138/68   17 (!) 150/98    Weight from Last 3 Encounters:   17 188 lb (85.3 kg)   17 185 lb (83.9 kg)   17 193 lb (87.5 kg)              We Performed the Following     Basic metabolic panel     CBC with platelets differential        Primary Care Provider    None       No address on file        Thank you!     " Thank you for [facility]  for your care. Our goal is always to provide you with excellent care. Hearing back from our patients is one way we can continue to improve our services. Please take a few minutes to complete the written survey that you may receive in the mail after your visit with us. Thank you!             Your Updated Medication List - Protect others around you: Learn how to safely use, store and throw away your medicines at www.disposemymeds.org.          This list is accurate as of: 4/12/17 11:22 AM.  Always use your most recent med list.                   Brand Name Dispense Instructions for use    acetaminophen 500 MG tablet    TYLENOL     Take 500 mg by mouth       AMLODIPINE BESYLATE PO      Take 5 mg by mouth 2 times daily       aspirin 325 MG tablet      Take 1 tablet by mouth every 4 hours as needed. For pain       CHLORTHALIDONE PO      Take 12.5 mg by mouth daily       lisinopril 40 MG tablet    PRINIVIL/ZESTRIL     Take 1 tablet by mouth daily.       METOPROLOL TARTRATE PO      Take 50 mg by mouth 2 times daily       omeprazole 20 MG CR capsule    priLOSEC    90 capsule    TAKE ONE CAPSULE BY MOUTH EVERY DAY BEFORE A MEAL.

## 2017-04-17 NOTE — H&P (VIEW-ONLY)
[facility]  [address] [place] [address]  [phone]  Dept: [phone]    PRE-OP EVALUATION:  Today's date: 2017    Ramo Berry (: 1960) presents for pre-operative evaluation assessment as requested by Dr. Farrell .  He requires evaluation and anesthesia risk assessment prior to undergoing surgery/procedure for treatment of right rotator cuff repair  .  Proposed procedure: arthroscopy right shoulder rotator cuff repair     Date of Surgery/ Procedure: 17  Time of Surgery/ Procedure: [facility]   [place]urgical [place] [facility] [place]   [place] Physician: None  Type of Anesthesia Anticipated: to be determined    Patient has a Health Care Directive or Living Will:  NO    1. NO - Do you have a history of heart attack, stroke, stent, bypass or surgery on an artery in the head, neck, heart or legs?  2. NO - Do you ever have any pain or discomfort in your chest?  3. NO - Do you have a history of  Heart Failure?  4. NO - Are you troubled by shortness of breath when: walking on the level, up a slight hill or at night?  5. NO - Do you currently have a cold, bronchitis or other respiratory infection?  6. NO - Do you have a cough, shortness of breath or wheezing?  7. NO - Do you sometimes get pains in the calves of your legs when you walk?  8. NO - Do you or anyone in your family have previous history of blood clots?  9. NO - Do you or does anyone in your family have a serious bleeding problem such as prolonged bleeding following surgeries or cuts?  10. YES - Have you ever had problems with anemia or been told to take iron pills?  11. NO - Have you had any abnormal blood loss such as black, tarry or bloody stools, or abnormal vaginal bleeding?  12. YES - Have you ever had a blood transfusion?  13. NO - Have you or any of your relatives ever had problems with anesthesia?  14. NO - Do you have sleep apnea, excessive snoring or daytime drowsiness?  15. NO - Do you have any prosthetic heart  valves?  16. NO - Do you have prosthetic joints?  17. NO - Is there any chance that you may be pregnant?      HPI:                                                      Brief HPI related to upcoming procedure: has shoulder repair of right  rotator cuff approximately 9 months ago. Continues with pain and limited movement.  No strength in his right shoulder.       See problem list for active medical problems.  Problems all longstanding and stable, except as noted/documented.  See ROS for pertinent symptoms related to these conditions.                                                                                                  .    MEDICAL HISTORY:                                                      Patient Active Problem List    Diagnosis Date Noted     Advanced care planning/counseling discussion 09/20/2012     Priority: Medium      Past Medical History:   Diagnosis Date     Anxiety state, unspecified 09/20/2012     Back Pain 09/20/2012     Cervical disc disease 09/20/2012     Chronic pain syndrome 09/20/2012     Essential hypertension, benign 09/20/2012     Past Surgical History:   Procedure Laterality Date     C6 C7 cervical fusion       cataract extraction and lens implantation      Bilateral      cystoscopy with biopsies      hematuria     fusion L5 S1       L4 L5 spinal fusion       MOUTH SURGERY       ORTHOPEDIC SURGERY Right 08/09/2016    rotator cuff surgery      Current Outpatient Prescriptions   Medication Sig Dispense Refill     acetaminophen (TYLENOL) 500 MG tablet Take 500 mg by mouth       omeprazole (PRILOSEC) 20 MG CR capsule TAKE ONE CAPSULE BY MOUTH EVERY DAY BEFORE A MEAL. 90 capsule 3     METOPROLOL TARTRATE PO Take 50 mg by mouth 2 times daily       CHLORTHALIDONE PO Take 12.5 mg by mouth daily       AMLODIPINE BESYLATE PO Take 5 mg by mouth 2 times daily       aspirin 325 MG tablet Take 1 tablet by mouth every 4 hours as needed. For pain       lisinopril (PRINIVIL,ZESTRIL) 40 MG tablet Take  "1 tablet by mouth daily.       OTC products: None, except as noted above    No Known Allergies   Latex Allergy: NO    Social History   Substance Use Topics     Smoking status: Current Every Day Smoker     Packs/day: 1.00     Years: 35.00     Types: Cigarettes     Smokeless tobacco: Not on file      Comment: Tried to Quit: Yes; Passive Exposure: Yes; Longest Tobacco Free: 2 years      Alcohol use No      Comment: Rarely      History   Drug Use No       REVIEW OF SYSTEMS:                                                    C: NEGATIVE for fever, chills, change in weight  I: NEGATIVE for worrisome rashes, moles or lesions  E: NEGATIVE for vision changes or irritation  E/M: NEGATIVE for ear, mouth and throat problems  R: NEGATIVE for significant cough or SOB  CV: NEGATIVE for chest pain, palpitations or peripheral edema  GI: NEGATIVE for nausea, abdominal pain, heartburn, or change in bowel habits  : NEGATIVE for frequency, dysuria, or hematuria  MUSCULOSKELETAL:see hpi  N: NEGATIVE for weakness, dizziness or paresthesias  E: NEGATIVE for temperature intolerance, skin/hair changes  H: NEGATIVE for bleeding problems  P: NEGATIVE for changes in mood or affect    EXAM:                                                    /80 (BP Location: Left arm, Patient Position: Chair, Cuff Size: Adult Large)  Pulse 58  Temp 96.4  F (35.8  C) (Tympanic)  Ht 5' 8.25\" (1.734 m)  Wt 188 lb (85.3 kg)  SpO2 98%  BMI 28.38 kg/m2    GENERAL APPEARANCE: healthy, alert and no distress     EYES: EOMI,  PERRL     HENT: ear canals and TM's normal and nose and mouth without ulcers or lesions     NECK: no adenopathy, no asymmetry, masses, or scars and thyroid normal to palpation     RESP: lungs clear to auscultation - no rales, rhonchi or wheezes     CV: regular rates and rhythm, normal S1 S2, no S3 or S4 and no murmur, click or rub     ABDOMEN:  soft, nontender, no HSM or masses and bowel sounds normal     MS: gait normal, normal muscle " tone and decreased range of motion above 90 degrees. Painful and does compensate.      SKIN: no suspicious lesions or rashes     NEURO: Normal strength and tone, sensory exam grossly normal, mentation intact and speech normal     PSYCH: mentation appears normal. and affect normal/bright     LYMPHATICS: No axillary, cervical, or supraclavicular nodes    DIAGNOSTICS:                                                      EKG: appears normal, NSR, sinus bradycardia, normal axis, normal intervals, no acute ST/T changes c/w ischemia, no LVH by voltage criteria  Serum Potassium  Serum Creatinine  Labs Drawn and in Process:   Unresulted Labs Ordered in the Past 30 Days of this Admission     No orders found from 2/11/2017 to 4/13/2017.          Recent Labs   Lab Test  08/05/16   1544  09/26/15   1344   HGB  13.5  7.3*   PLT  337  569*   NA  135  141   POTASSIUM  4.3  3.8   CR  1.46*  0.94        IMPRESSION:                                                    Reason for surgery/procedure: right shoulder arthroscopy.   Diagnosis/reason for consult: medical clearance.     The proposed surgical procedure is considered INTERMEDIATE risk.    REVISED CARDIAC RISK INDEX  The patient has the following serious cardiovascular risks for perioperative complications such as (MI, PE, VFib and 3  AV Block):  No serious cardiac risks  INTERPRETATION: 0 risks: Class I (very low risk - 0.4% complication rate)    The patient has the following additional risks for perioperative complications:  No identified additional risks        RECOMMENDATIONS:                                                        1. Preop general physical exam  He is optimized.  Has a failed shoulder surgery. Smoker without hx of COPD.     2. Benign essential hypertension  He has hx of this without follow up. Dr. Calabrese is primary MD.  Will get some labs today.  Good control.   - CBC with platelets differential  - Basic metabolic panel    --Patient is to take all  scheduled medications on the day of surgery EXCEPT for modifications listed below.    ACE Inhibitor or Angiotensin Receptor Blocker (ARB) Use  Ace inhibitor or Angiotensin Receptor Blocker (ARB) and should HOLD this medication for the 24 hours prior to surgery. Also hold chlorthalidone.       APPROVAL GIVEN to proceed with proposed procedure, without further diagnostic evaluation       Signed Electronically by: TAVIA Squires    Copy of this evaluation report is provided to requesting physician.    [facility] Preop Guidelines

## 2017-04-21 ENCOUNTER — ANESTHESIA EVENT (OUTPATIENT)
Dept: SURGERY | Facility: HOSPITAL | Age: 57
End: 2017-04-21
Payer: MEDICARE

## 2017-04-21 ENCOUNTER — HOSPITAL ENCOUNTER (OUTPATIENT)
Facility: HOSPITAL | Age: 57
Discharge: HOME | End: 2017-04-21
Attending: ORTHOPAEDIC SURGERY | Admitting: ORTHOPAEDIC SURGERY
Payer: MEDICARE

## 2017-04-21 ENCOUNTER — ANESTHESIA (OUTPATIENT)
Dept: SURGERY | Facility: HOSPITAL | Age: 57
End: 2017-04-21
Payer: MEDICARE

## 2017-04-21 ENCOUNTER — SURGERY (OUTPATIENT)
Age: 57
End: 2017-04-21

## 2017-04-21 VITALS
OXYGEN SATURATION: 93 % | TEMPERATURE: 97.7 F | RESPIRATION RATE: 16 BRPM | DIASTOLIC BLOOD PRESSURE: 97 MMHG | SYSTOLIC BLOOD PRESSURE: 156 MMHG

## 2017-04-21 DIAGNOSIS — G89.29 CHRONIC RIGHT SHOULDER PAIN: Primary | ICD-10-CM

## 2017-04-21 DIAGNOSIS — M25.511 CHRONIC RIGHT SHOULDER PAIN: Primary | ICD-10-CM

## 2017-04-21 PROCEDURE — 71000027 ZZH RECOVERY PHASE 2 EACH 15 MINS: Performed by: ORTHOPAEDIC SURGERY

## 2017-04-21 PROCEDURE — 29828 SHO ARTHRS SRG BICP TENODSIS: CPT | Mod: RT | Performed by: ORTHOPAEDIC SURGERY

## 2017-04-21 PROCEDURE — S0020 INJECTION, BUPIVICAINE HYDRO: HCPCS | Performed by: NURSE ANESTHETIST, CERTIFIED REGISTERED

## 2017-04-21 PROCEDURE — 36000056 ZZH SURGERY LEVEL 3 1ST 30 MIN: Performed by: ORTHOPAEDIC SURGERY

## 2017-04-21 PROCEDURE — 01630 ANES OPN/ARTHR PX SHO JT NOS: CPT | Mod: QK | Performed by: ANESTHESIOLOGY

## 2017-04-21 PROCEDURE — 27110028 ZZH OR GENERAL SUPPLY NON-STERILE: Performed by: ORTHOPAEDIC SURGERY

## 2017-04-21 PROCEDURE — A9270 NON-COVERED ITEM OR SERVICE: HCPCS | Mod: GY | Performed by: ORTHOPAEDIC SURGERY

## 2017-04-21 PROCEDURE — 27210794 ZZH OR GENERAL SUPPLY STERILE: Performed by: ORTHOPAEDIC SURGERY

## 2017-04-21 PROCEDURE — 37000009 ZZH ANESTHESIA TECHNICAL FEE, EACH ADDTL 15 MIN: Performed by: ORTHOPAEDIC SURGERY

## 2017-04-21 PROCEDURE — 25000128 H RX IP 250 OP 636: Performed by: NURSE ANESTHETIST, CERTIFIED REGISTERED

## 2017-04-21 PROCEDURE — 01630 ANES OPN/ARTHR PX SHO JT NOS: CPT | Mod: QX | Performed by: NURSE ANESTHETIST, CERTIFIED REGISTERED

## 2017-04-21 PROCEDURE — 76942 ECHO GUIDE FOR BIOPSY: CPT | Mod: 26 | Performed by: ANESTHESIOLOGY

## 2017-04-21 PROCEDURE — 64415 NJX AA&/STRD BRCH PLXS IMG: CPT | Mod: 59 | Performed by: ANESTHESIOLOGY

## 2017-04-21 PROCEDURE — 25000125 ZZHC RX 250: Performed by: ORTHOPAEDIC SURGERY

## 2017-04-21 PROCEDURE — 25000125 ZZHC RX 250: Performed by: NURSE ANESTHETIST, CERTIFIED REGISTERED

## 2017-04-21 PROCEDURE — 71000014 ZZH RECOVERY PHASE 1 LEVEL 2 FIRST HR: Performed by: ORTHOPAEDIC SURGERY

## 2017-04-21 PROCEDURE — 25000128 H RX IP 250 OP 636: Performed by: ORTHOPAEDIC SURGERY

## 2017-04-21 PROCEDURE — 25800025 ZZH RX 258: Performed by: ANESTHESIOLOGY

## 2017-04-21 PROCEDURE — 27211024 ZZHC OR SUPPLY OTHER OPNP: Performed by: ORTHOPAEDIC SURGERY

## 2017-04-21 PROCEDURE — 29827 SHO ARTHRS SRG RT8TR CUF RPR: CPT | Mod: RT | Performed by: ORTHOPAEDIC SURGERY

## 2017-04-21 PROCEDURE — 37000008 ZZH ANESTHESIA TECHNICAL FEE, 1ST 30 MIN: Performed by: ORTHOPAEDIC SURGERY

## 2017-04-21 PROCEDURE — 25000125 ZZHC RX 250: Performed by: ANESTHESIOLOGY

## 2017-04-21 PROCEDURE — 36000058 ZZH SURGERY LEVEL 3 EA 15 ADDTL MIN: Performed by: ORTHOPAEDIC SURGERY

## 2017-04-21 PROCEDURE — 25000132 ZZH RX MED GY IP 250 OP 250 PS 637: Mod: GY | Performed by: ORTHOPAEDIC SURGERY

## 2017-04-21 PROCEDURE — C1713 ANCHOR/SCREW BN/BN,TIS/BN: HCPCS | Performed by: ORTHOPAEDIC SURGERY

## 2017-04-21 PROCEDURE — 40000306 ZZH STATISTIC PRE PROC ASSESS II: Performed by: ORTHOPAEDIC SURGERY

## 2017-04-21 DEVICE — IMPLANTABLE DEVICE: Type: IMPLANTABLE DEVICE | Site: SHOULDER | Status: FUNCTIONAL

## 2017-04-21 RX ORDER — FENTANYL CITRATE 50 UG/ML
INJECTION, SOLUTION INTRAMUSCULAR; INTRAVENOUS PRN
Status: DISCONTINUED | OUTPATIENT
Start: 2017-04-21 | End: 2017-04-21

## 2017-04-21 RX ORDER — DEXAMETHASONE SODIUM PHOSPHATE 10 MG/ML
INJECTION, SOLUTION INTRAMUSCULAR; INTRAVENOUS PRN
Status: DISCONTINUED | OUTPATIENT
Start: 2017-04-21 | End: 2017-04-21

## 2017-04-21 RX ORDER — PROMETHAZINE HYDROCHLORIDE 25 MG/ML
12.5 INJECTION, SOLUTION INTRAMUSCULAR; INTRAVENOUS
Status: DISCONTINUED | OUTPATIENT
Start: 2017-04-21 | End: 2017-04-21 | Stop reason: HOSPADM

## 2017-04-21 RX ORDER — OXYCODONE AND ACETAMINOPHEN 10; 325 MG/1; MG/1
1-2 TABLET ORAL
Status: COMPLETED | OUTPATIENT
Start: 2017-04-21 | End: 2017-04-21

## 2017-04-21 RX ORDER — BUPIVACAINE HYDROCHLORIDE AND EPINEPHRINE 2.5; 5 MG/ML; UG/ML
INJECTION, SOLUTION EPIDURAL; INFILTRATION; INTRACAUDAL; PERINEURAL PRN
Status: DISCONTINUED | OUTPATIENT
Start: 2017-04-21 | End: 2017-04-21 | Stop reason: HOSPADM

## 2017-04-21 RX ORDER — HYDROMORPHONE HYDROCHLORIDE 1 MG/ML
.3-.5 INJECTION, SOLUTION INTRAMUSCULAR; INTRAVENOUS; SUBCUTANEOUS EVERY 10 MIN PRN
Status: DISCONTINUED | OUTPATIENT
Start: 2017-04-21 | End: 2017-04-21 | Stop reason: HOSPADM

## 2017-04-21 RX ORDER — ONDANSETRON 2 MG/ML
4 INJECTION INTRAMUSCULAR; INTRAVENOUS EVERY 30 MIN PRN
Status: DISCONTINUED | OUTPATIENT
Start: 2017-04-21 | End: 2017-04-21 | Stop reason: HOSPADM

## 2017-04-21 RX ORDER — OXYCODONE AND ACETAMINOPHEN 10; 325 MG/1; MG/1
1-2 TABLET ORAL EVERY 4 HOURS PRN
Qty: 30 TABLET | Refills: 0 | Status: SHIPPED | OUTPATIENT
Start: 2017-04-21 | End: 2017-04-24

## 2017-04-21 RX ORDER — ALBUTEROL SULFATE 0.83 MG/ML
2.5 SOLUTION RESPIRATORY (INHALATION) EVERY 4 HOURS PRN
Status: DISCONTINUED | OUTPATIENT
Start: 2017-04-21 | End: 2017-04-21 | Stop reason: HOSPADM

## 2017-04-21 RX ORDER — CEFAZOLIN SODIUM 2 G/100ML
2 INJECTION, SOLUTION INTRAVENOUS
Status: COMPLETED | OUTPATIENT
Start: 2017-04-21 | End: 2017-04-21

## 2017-04-21 RX ORDER — LIDOCAINE HYDROCHLORIDE 20 MG/ML
INJECTION, SOLUTION INFILTRATION; PERINEURAL PRN
Status: DISCONTINUED | OUTPATIENT
Start: 2017-04-21 | End: 2017-04-21

## 2017-04-21 RX ORDER — SCOLOPAMINE TRANSDERMAL SYSTEM 1 MG/1
1 PATCH, EXTENDED RELEASE TRANSDERMAL ONCE
Status: COMPLETED | OUTPATIENT
Start: 2017-04-21 | End: 2017-04-21

## 2017-04-21 RX ORDER — SODIUM CHLORIDE, SODIUM LACTATE, POTASSIUM CHLORIDE, CALCIUM CHLORIDE 600; 310; 30; 20 MG/100ML; MG/100ML; MG/100ML; MG/100ML
INJECTION, SOLUTION INTRAVENOUS CONTINUOUS
Status: DISCONTINUED | OUTPATIENT
Start: 2017-04-21 | End: 2017-04-21 | Stop reason: HOSPADM

## 2017-04-21 RX ORDER — NALOXONE HYDROCHLORIDE 0.4 MG/ML
.1-.4 INJECTION, SOLUTION INTRAMUSCULAR; INTRAVENOUS; SUBCUTANEOUS
Status: DISCONTINUED | OUTPATIENT
Start: 2017-04-21 | End: 2017-04-21 | Stop reason: HOSPADM

## 2017-04-21 RX ORDER — LIDOCAINE 40 MG/G
CREAM TOPICAL
Status: DISCONTINUED | OUTPATIENT
Start: 2017-04-21 | End: 2017-04-21 | Stop reason: HOSPADM

## 2017-04-21 RX ORDER — ONDANSETRON 2 MG/ML
INJECTION INTRAMUSCULAR; INTRAVENOUS PRN
Status: DISCONTINUED | OUTPATIENT
Start: 2017-04-21 | End: 2017-04-21

## 2017-04-21 RX ORDER — LABETALOL HYDROCHLORIDE 5 MG/ML
10 INJECTION, SOLUTION INTRAVENOUS
Status: DISCONTINUED | OUTPATIENT
Start: 2017-04-21 | End: 2017-04-21 | Stop reason: HOSPADM

## 2017-04-21 RX ORDER — PROPOFOL 10 MG/ML
INJECTION, EMULSION INTRAVENOUS PRN
Status: DISCONTINUED | OUTPATIENT
Start: 2017-04-21 | End: 2017-04-21

## 2017-04-21 RX ORDER — ONDANSETRON 4 MG/1
4 TABLET, ORALLY DISINTEGRATING ORAL EVERY 30 MIN PRN
Status: DISCONTINUED | OUTPATIENT
Start: 2017-04-21 | End: 2017-04-21 | Stop reason: HOSPADM

## 2017-04-21 RX ORDER — KETOROLAC TROMETHAMINE 30 MG/ML
30 INJECTION, SOLUTION INTRAMUSCULAR; INTRAVENOUS EVERY 6 HOURS PRN
Status: DISCONTINUED | OUTPATIENT
Start: 2017-04-21 | End: 2017-04-21 | Stop reason: HOSPADM

## 2017-04-21 RX ORDER — FENTANYL CITRATE 50 UG/ML
25-50 INJECTION, SOLUTION INTRAMUSCULAR; INTRAVENOUS
Status: DISCONTINUED | OUTPATIENT
Start: 2017-04-21 | End: 2017-04-21 | Stop reason: HOSPADM

## 2017-04-21 RX ORDER — MEPERIDINE HYDROCHLORIDE 25 MG/ML
12.5 INJECTION INTRAMUSCULAR; INTRAVENOUS; SUBCUTANEOUS
Status: DISCONTINUED | OUTPATIENT
Start: 2017-04-21 | End: 2017-04-21 | Stop reason: HOSPADM

## 2017-04-21 RX ORDER — DEXAMETHASONE SODIUM PHOSPHATE 4 MG/ML
4 INJECTION, SOLUTION INTRA-ARTICULAR; INTRALESIONAL; INTRAMUSCULAR; INTRAVENOUS; SOFT TISSUE EVERY 10 MIN PRN
Status: DISCONTINUED | OUTPATIENT
Start: 2017-04-21 | End: 2017-04-21 | Stop reason: HOSPADM

## 2017-04-21 RX ORDER — BUPIVACAINE HYDROCHLORIDE 5 MG/ML
INJECTION, SOLUTION EPIDURAL; INTRACAUDAL PRN
Status: DISCONTINUED | OUTPATIENT
Start: 2017-04-21 | End: 2017-04-21

## 2017-04-21 RX ORDER — HYDRALAZINE HYDROCHLORIDE 20 MG/ML
2.5-5 INJECTION INTRAMUSCULAR; INTRAVENOUS EVERY 10 MIN PRN
Status: DISCONTINUED | OUTPATIENT
Start: 2017-04-21 | End: 2017-04-21 | Stop reason: HOSPADM

## 2017-04-21 RX ADMIN — SODIUM CHLORIDE, POTASSIUM CHLORIDE, SODIUM LACTATE AND CALCIUM CHLORIDE: 600; 310; 30; 20 INJECTION, SOLUTION INTRAVENOUS at 11:36

## 2017-04-21 RX ADMIN — SUCCINYLCHOLINE CHLORIDE 100 MG: 20 INJECTION, SOLUTION INTRAMUSCULAR; INTRAVENOUS at 12:49

## 2017-04-21 RX ADMIN — BUPIVACAINE HYDROCHLORIDE 20 ML: 5 INJECTION, SOLUTION EPIDURAL; INTRACAUDAL at 11:45

## 2017-04-21 RX ADMIN — DEXAMETHASONE SODIUM PHOSPHATE 5 MG: 10 INJECTION, SOLUTION INTRAMUSCULAR; INTRAVENOUS at 11:45

## 2017-04-21 RX ADMIN — ONDANSETRON 4 MG: 2 INJECTION INTRAMUSCULAR; INTRAVENOUS at 13:30

## 2017-04-21 RX ADMIN — FENTANYL CITRATE 50 MCG: 50 INJECTION, SOLUTION INTRAMUSCULAR; INTRAVENOUS at 13:45

## 2017-04-21 RX ADMIN — ROCURONIUM BROMIDE 30 MG: 10 INJECTION INTRAVENOUS at 12:58

## 2017-04-21 RX ADMIN — FENTANYL CITRATE 100 MCG: 50 INJECTION, SOLUTION INTRAMUSCULAR; INTRAVENOUS at 12:47

## 2017-04-21 RX ADMIN — DEXAMETHASONE SODIUM PHOSPHATE 5 MG: 10 INJECTION, SOLUTION INTRAMUSCULAR; INTRAVENOUS at 13:00

## 2017-04-21 RX ADMIN — PROPOFOL 150 MG: 10 INJECTION, EMULSION INTRAVENOUS at 12:48

## 2017-04-21 RX ADMIN — CEFAZOLIN SODIUM 2 G: 2 INJECTION, SOLUTION INTRAVENOUS at 12:43

## 2017-04-21 RX ADMIN — SODIUM CHLORIDE, POTASSIUM CHLORIDE, SODIUM LACTATE AND CALCIUM CHLORIDE 200 ML: 600; 310; 30; 20 INJECTION, SOLUTION INTRAVENOUS at 14:11

## 2017-04-21 RX ADMIN — MIDAZOLAM HYDROCHLORIDE 2 MG: 1 INJECTION, SOLUTION INTRAMUSCULAR; INTRAVENOUS at 12:42

## 2017-04-21 RX ADMIN — OXYCODONE AND ACETAMINOPHEN 1 TABLET: 10; 325 TABLET ORAL at 15:07

## 2017-04-21 RX ADMIN — LIDOCAINE HYDROCHLORIDE 40 MG: 20 INJECTION, SOLUTION INFILTRATION; PERINEURAL at 12:48

## 2017-04-21 RX ADMIN — SCOPALAMINE 1 PATCH: 1 PATCH, EXTENDED RELEASE TRANSDERMAL at 11:20

## 2017-04-21 RX ADMIN — ROCURONIUM BROMIDE 10 MG: 10 INJECTION INTRAVENOUS at 12:48

## 2017-04-21 RX ADMIN — BUPIVACAINE HYDROCHLORIDE AND EPINEPHRINE 10 ML: 2.5; 5 INJECTION, SOLUTION EPIDURAL; INFILTRATION; INTRACAUDAL; PERINEURAL at 13:44

## 2017-04-21 ASSESSMENT — COPD QUESTIONNAIRES
COPD: 1
CAT_SEVERITY: MILD

## 2017-04-21 ASSESSMENT — LIFESTYLE VARIABLES: TOBACCO_USE: 1

## 2017-04-21 NOTE — IP AVS SNAPSHOT
MRN:[id number]                      After Visit Summary   4/21/2017    Ramo Berry    MRN: [id number]           Thank you!     Thank you for choosing [facility] for your care. Our goal is always to provide you with excellent care. Hearing back from our patients is one way we can continue to improve our services. Please take a few minutes to complete the written survey that you may receive in the mail after you visit with us. Thank you!        Patient Information     Date Of Birth          1960        About your hospital stay     You were admitted on:  April 21, 2017 You last received care in the:  [facility] PACU    You were discharged on:  April 21, 2017       Who to Call     For medical emergencies, please call 911.  For non-urgent questions about your medical care, please call your primary care provider or clinic, [phone]  For questions related to your surgery, please call your surgery clinic        Attending Provider     Provider Talha Good DO Orthopedics       Primary Care Provider Office Phone # Fax #    Koffi Jean-Baptiste [phone]       [facility] [address] [place] [address]  [facility] [address]        After Care Instructions      Diet as Tolerated       Return to diet before surgery, unless instructed otherwise.            Discharge Instructions       Review outpatient procedure discharge instructions with patient as directed by Provider            Ice to affected area       Ice pack to surgical site every 15 minutes per hour for 24 hours            Remove dressing - at 72 hours                  Your next 10 appointments already scheduled     May 08, 2017 10:30 AM CDT   (Arrive by 10:15 AM)   Post Op with Talha Farrell DO    ORTHOPEDICS ([facility])    [address] [facility] [address] [phone]              Further instructions from your care team           Post-Anesthesia Patient Instructions    IMMEDIATELY FOLLOWING  SURGERY:  Do not drive or operate machinery for the first twenty four hours after surgery.  Do not make any important decisions for twenty four hours after surgery or while taking narcotic pain medications or sedatives.  If you develop intractable nausea and vomiting or a severe headache please notify your doctor immediately.    FOLLOW-UP:  Please make an appointment with your surgeon as instructed. You do not need to follow up with anesthesia unless specifically instructed to do so.    WOUND CARE INSTRUCTIONS (if applicable):  Keep a dry clean dressing on the anesthesia/puncture wound site if there is drainage.  Once the wound has quit draining you may leave it open to air.  Generally you should leave the bandage intact for twenty four hours unless there is drainage.  If the epidural site drains for more than 36-48 hours please call the anesthesia department.    QUESTIONS?:  Please feel free to call your physician or the hospital  if you have any questions, and they will be happy to assist you.           POST OPERATIVE PATIENT INFORMATION  Shoulder Surgery    DIET  No restrictions unless specifically ordered by your physician.  DISCOMFORT  You should have only minimal discomfort.  There may be some tenderness around your incision.  If you have a prescribed medication and it is not controlling your pain, please notify your doctor.  You may use ice to your shoulder for comfort as needed.  CARE OF INCISION  Staples/Stitches: If the staples/stitches were removed during your hospital stay and steri-strips (thin strips of tape) were applied to the incision, leave them on until you see your doctor or they loosen on their own.  If your doctor sends you home with the staples/stitches intact, keep the dressing dry.  If a clear plastic covering has been applied to the incision prior to discharge, leave it on until you return to see your doctor.  You may shower as instructed by your  physician.  ACTIVITY  Sling/Immobilizer: Continue to wear your sling or immobilizer at all times unless instructed otherwise.  Be sure the sling/immobilizer is properly positioned and elevates your arm.  Positioning: Position the head of your bed up with pillows.  A small pillow positioned behind your affected shoulder will also keep your shoulder from falling back and this decreases pain.  Keep your arm close to your side.  Exercises: Wiggle your fingers frequently to aid circulation.  Practice other exercises only if you were instructed in physical therapy or by your physician.  STOP EXERCISING IMMEDIATELY IF YOU HAVE SEVERE SHOULDER PAIN OR DISCOMFORT.  ACTIVITIES TO AVOID  Do not move your affected arm/shoulder away from your side unless instructed by your doctor.  This motion places stress on the surgical area.  Do not lie on your affected side unless your doctor says you can.  CONTACT YOUR DOCTOR  Contact your doctor if any of the following conditions occur:    Have loss of movement or sensation to your fingers or hand.    Notice redness, swelling or warmth around your incision.    Have more than a small amount of drainage.    Develop a fever of 100  or higher, or start having chills.    Have severe pain, unrelieved by the medication prescribed for you.    Fingers/hand become cold, blue or swollen  If you have any questions, call your doctor s office.       CODMAN S EXERCISE    GENTLE PASSIVE RANGE OF MOTION EXERCISE    Rock your body back and forth or in circles.    Let your arm hang at your side and swing like a pendulum.    Don t use arm muscles to move your arm, rather use the sway of your body.    Do this 3 times a day for 3 minutes.    *WHEN NOT EXERCISING KEEP ARM IN SLING!      Remove the scopolamine patch behind your left ear after 24 hours after application.   After removing the patch, wash your hands and the area behind your ear thoroughly with soap and water.   The patch will still contain some  "medicine after use.   To avoid accidental contact or ingestion by children or pets, fold the used patch in half with the sticky side together and throw away in the trash out of the reach of children and pets.       Pending Results     No orders found from 2017 to 2017.            Admission Information     Date & Time Provider Department Dept. Phone    2017 Talha Farrell DO [facility] PACU [phone]      Your Vitals Were     Blood Pressure Temperature Respirations Pulse Oximetry          149/86 98  F (36.7  C) (Oral) 13 99%        MyChart Information     [employer] lets you send messages to your doctor, view your test results, renew your prescriptions, schedule appointments and more. To sign up, go to [email or web address] . Click on \"Log in\" on the left side of the screen, which will take you to the Welcome page. Then click on \"Sign up Now\" on the right side of the page.     You will be asked to enter the access code listed below, as well as some personal information. Please follow the directions to create your username and password.     Your access code is: [email or web address]  Expires: 2017  9:21 AM     Your access code will  in 90 days. If you need help or a new code, please call your [facility] clinic or [phone].        Care EveryWhere ID     This is your Care EveryWhere ID. This could be used by other organizations to access your [facility] medical records  [id number]           Review of your medicines      UNREVIEWED medicines. Ask your doctor about these medicines        Dose / Directions    acetaminophen 500 MG tablet   Commonly known as:  TYLENOL        Dose:  500 mg   Take 500 mg by mouth   Refills:  0       AMLODIPINE BESYLATE PO        Dose:  5 mg   Take 5 mg by mouth 2 times daily   Refills:  0       aspirin 325 MG tablet        Dose:  1 tablet   Take 1 tablet by mouth every 4 hours as needed. For pain   Refills:  0       CHLORTHALIDONE PO        Dose:  12.5 mg   Take 12.5 mg by mouth " daily   Refills:  0       lisinopril 40 MG tablet   Commonly known as:  PRINIVIL/ZESTRIL        Dose:  1 tablet   Take 1 tablet by mouth daily.   Refills:  0       METOPROLOL TARTRATE PO        Dose:  50 mg   Take 50 mg by mouth 2 times daily   Refills:  0       omeprazole 20 MG CR capsule   Commonly known as:  priLOSEC   Used for:  Diagnosis unknown        TAKE ONE CAPSULE BY MOUTH EVERY DAY BEFORE A MEAL.   Quantity:  90 capsule   Refills:  3         START taking        Dose / Directions    oxyCODONE-acetaminophen  MG per tablet   Commonly known as:  PERCOCET   Used for:  Chronic right shoulder pain        Dose:  1-2 tablet   Take 1-2 tablets by mouth every 4 hours as needed for pain (moderate to severe)   Quantity:  30 tablet   Refills:  0            Where to get your medicines      Some of these will need a paper prescription and others can be bought over the counter. Ask your nurse if you have questions.     Bring a paper prescription for each of these medications     oxyCODONE-acetaminophen  MG per tablet                Protect others around you: Learn how to safely use, store and throw away your medicines at www.disposemymeds.org.             Medication List: This is a list of all your medications and when to take them. Check marks below indicate your daily home schedule. Keep this list as a reference.      Medications           Morning Afternoon Evening Bedtime As Needed    acetaminophen 500 MG tablet   Commonly known as:  TYLENOL   Take 500 mg by mouth                                AMLODIPINE BESYLATE PO   Take 5 mg by mouth 2 times daily                                aspirin 325 MG tablet   Take 1 tablet by mouth every 4 hours as needed. For pain                                CHLORTHALIDONE PO   Take 12.5 mg by mouth daily                                lisinopril 40 MG tablet   Commonly known as:  PRINIVIL/ZESTRIL   Take 1 tablet by mouth daily.                                METOPROLOL  TARTRATE PO   Take 50 mg by mouth 2 times daily                                omeprazole 20 MG CR capsule   Commonly known as:  priLOSEC   TAKE ONE CAPSULE BY MOUTH EVERY DAY BEFORE A MEAL.                                oxyCODONE-acetaminophen  MG per tablet   Commonly known as:  PERCOCET   Take 1-2 tablets by mouth every 4 hours as needed for pain (moderate to severe)

## 2017-04-21 NOTE — OP NOTE
Preoperative diagnosis is rotator cuff tear, and biceps tendon tear of the right shoulder.    Postoperative diagnosis: Right shoulder rotator cuff tear and biceps tendon tear    Procedure performed: Arthroscopic examination of the right shoulder with mini open repair of the supraspinatus tendon tear and biceps tenodesis    Anesthesia: General anesthesia and interscalene block    Surgeon Dr. Talha Farrell    EBL: 10 cc    Description of procedure: After patient anesthetized, he was prepped draped and positioned on the beachchair positioner.  The shoulder was examined under anesthesia and found to be stable and have a full passive range of motion.  Patient had a previous arthroscopic examination of the shoulder with subacromial decompression and acromioclavicular joint resection, the posterior portal scar was used as the basis for today's arthroscopy.  Diagnostic arthroscopy of the shoulder demonstrated a near complete tear of the biceps tendon long head as it exited the intertubercular groove into the shoulder joint, and an adjacent supraspinatus tendon avulsion measuring approximately 1-1/2 cm in width subacromial space had considerable amount of bursal tissue on but no other pathology was noted in the subacromial space the glenohumeral articular surface and labrum appeared intact the anterolateral portal was then extended to facilitate a mini open rotator cuff repair and biceps tenodesis through this anterolateral incision the deltoid muscle was split in line with its fibers and the small tear in the rotator cuff was exposed this area was debrided to remove the gritty and ineffective residual torn tendon tissue, and the biceps tendon was identified.  Biceps tendon was secured with a stay suture and detached from the glenoid a drill hole was made measuring 6 mm in diameter and approximately the midpoint of the intertubercular groove.  A swivel lock suture anchor system was then utilized to perform a biceps tenodesis into  this drill hole.  A separate suture anchor was then placed in a prepared bed of bone on the greater tuberosity.  This is a double-armed suture anchor and the 2 limbs of the suture were used to facilitate a double row repair of the avulsed supraspinatus tendon.  Once these sutures were secured, the shoulder was examined again visually and with inspection and palpation, no additional pathology was encountered.  The wounds were closed and a bulky sterile dressing applied and patient was taken to the recovery room in a sling, awake and alert.  Postoperative plan is continue in the sling for probably 4 weeks, remove only for gentle range of motion exercises, hygiene, and when seated in a very safe location.  Postop follow-up has been arranged and urgent and emergent instructions given should he need assistance.

## 2017-04-21 NOTE — ANESTHESIA PREPROCEDURE EVALUATION
Anesthesia Evaluation     . Pt has had prior anesthetic.     No history of anesthetic complications          ROS/MED HX    ENT/Pulmonary:     (+)tobacco use, Current use 1.0 PPD packs/day  mild COPD, , . .    Neurologic:  - neg neurologic ROS     Cardiovascular:     (+) Dyslipidemia, hypertension-range: on beta blocker (metoprolol) , ---. : . . . :. . Previous cardiac testing date:results:date: results:ECG reviewed date:8/5/2016 results:SB@51, OWN date: results:          METS/Exercise Tolerance:     Hematologic:     (+) Anemia, History of Transfusion no previous transfusion reaction -      Musculoskeletal:   (+) , , other musculoskeletal- CHRONIC RIGHT SHOULDER PAIN, TORN TENDON, Cervicalgia s/p C6-7 Fusion, Chronic LBP s/p L4-S1 Fusion      GI/Hepatic:     (+) GERD       Renal/Genitourinary:     (+) chronic renal disease (Stage 3 (moderate) GFR: 56), type: CRI,       Endo:  - neg endo ROS       Psychiatric:     (+) psychiatric history anxiety      Infectious Disease:  - neg infectious disease ROS       Malignancy:      - no malignancy   Other:    (+) H/O Chronic Pain,H/O chronic opiod use ,                    Physical Exam  Normal systems: cardiovascular    Airway   Mallampati: IV  TM distance: >3 FB  Neck ROM: full    Dental   (+) upper dentures, lower dentures, partials and missing    Cardiovascular   Rhythm and rate: regular and normal      Pulmonary (+) decreased breath sounds                       Anesthesia Plan      History & Physical Review  History and physical reviewed and following examination; no interval change.    ASA Status:  3 .    NPO Status:  > 8 hours    Plan for General, ETT and Periph. Nerve Block for postop pain with Intravenous and Propofol induction. Maintenance will be Balanced.    PONV prophylaxis:  Ondansetron (or other 5HT-3), Scopolamine patch and Dexamethasone or Solumedrol  Took beta blocker this AM      Postoperative Care  Postoperative pain management:  IV analgesics, Oral pain  medications and Peripheral nerve block (Single Shot).      Consents  Anesthetic plan, risks, benefits and alternatives discussed with:  Patient..                          .

## 2017-04-21 NOTE — OR NURSING
Pt tolerating PO, denies pain, but would like to take a pain pill, before he leaves, Patient and responsible adult given discharge instructions with no questions regarding instructions. Tracy score 20. Pain level 0/10.  Discharged from unit via walking. Patient discharged to home with wife.

## 2017-04-21 NOTE — IP AVS SNAPSHOT
[address] [address]    Phone:  [phone]                                       After Visit Summary   4/21/2017    Ramo Berry    MRN: [id number]           After Visit Summary Signature Page     I have received my discharge instructions, and my questions have been answered. I have discussed any challenges I see with this plan with the nurse or doctor.    ..........................................................................................................................................  Patient/Patient Representative Signature      ..........................................................................................................................................  Patient Representative Print Name and Relationship to Patient    ..................................................               ................................................  Date                                            Time    ..........................................................................................................................................  Reviewed by Signature/Title    ...................................................              ..............................................  Date                                                            Time

## 2017-04-21 NOTE — ANESTHESIA CARE TRANSFER NOTE
Patient: Ramo Berry    Procedure(s):  RIGHT SHOULDER ARTHROSCOPY WITH Open  REPAIR OF ROTATOR CUFFand BICEP TENDODESIS - Wound Class: I-Clean   - Wound Class: I-Clean    Diagnosis: CHRONIC RIGHT SHOULDER PAIN, TORN TENDON  Diagnosis Additional Information: No value filed.    Anesthesia Type:   General, ETT, Periph. Nerve Block for postop pain     Note:  Airway :Nasal Cannula  Patient transferred to:PACU        Vitals: (Last set prior to Anesthesia Care Transfer)    CRNA VITALS  4/21/2017 1328 - 4/21/2017 1404      4/21/2017             Resp Rate (set): 8                Electronically Signed By: QUENTIN Martin CRNA  April 21, 2017  2:04 PM

## 2017-04-21 NOTE — ANESTHESIA PROCEDURE NOTES
Peripheral nerve/Neuraxial procedure note : Interscalene  Pre-Procedure    Location: pre-op    Procedure Times:4/21/2017 11:40 AM and 4/21/2017 11:59 AM  Pre-Anesthestic Checklist: patient identified, IV checked, site marked, risks and benefits discussed, informed consent, monitors and equipment checked, pre-op evaluation, at physician/surgeon's request and post-op pain management    Timeout  Correct Patient: Yes   Correct Procedure: Yes   Correct Site: Yes   Correct Laterality: Yes   Correct Position: Yes   Site Marked: Yes   .   Procedure Documentation    Diagnosis:RIGHT RCT.    Procedure:    Interscalene.  Local skin infiltrated with 1 mL of 1% lidocaine.     Ultrasound used to identify targeted nerve, plexus, or vascular marker and placed a needle adjacent to it., Ultrasound was used to visualize the spread of the anesthetic in close proximity to the above stated nerve. A permanent image is entered into the patient's record.  Patient Prep;mask, sterile gloves, chlorhexidine gluconate and isopropyl alcohol.  Nerve Stim: Initial Level 1 mA.  Lowest motor response 0.35 mA..  Needle: insulated, short bevel (22 G. 2 in). .  Spinal Needle: . . Insertion Method: Single Shot.     Assessment/Narrative  Paresthesias: No.  Injection made incrementally with aspirations every 5 mL..  The placement was negative for: blood aspirated, painful injection and site bleeding.  Bolus given via needle..   Secured via.   Complications: none. Comments:  x20cc 0.5% Marcaine + 1:200K Epi + 5mg Decadron

## 2017-04-21 NOTE — ANESTHESIA POSTPROCEDURE EVALUATION
Patient: Ramo Berry    Procedure(s):  RIGHT SHOULDER ARTHROSCOPY WITH Open  REPAIR OF ROTATOR CUFFand BICEP TENDODESIS - Wound Class: I-Clean   - Wound Class: I-Clean    Diagnosis:CHRONIC RIGHT SHOULDER PAIN, TORN TENDON  Diagnosis Additional Information: No value filed.    Anesthesia Type:  General, ETT, Periph. Nerve Block for postop pain    Note:  Anesthesia Post Evaluation    Patient location during evaluation: Phase 2, PACU and Bedside  Patient participation: Able to participate in evaluation but full recovery from regional anesthesia has not yet ocurrred but is anticipated to occur within 48 hours  Level of consciousness: awake and alert  Pain management: adequate  Airway patency: patent  Cardiovascular status: acceptable  Respiratory status: acceptable  Hydration status: stable  PONV: none     Anesthetic complications: None          Last vitals:  Vitals:    04/21/17 1420 04/21/17 1425 04/21/17 1430   BP: 149/86 150/93    Resp: 13 (!) 11 14   Temp:  97.7  F (36.5  C)    SpO2: 99% 98% 98%         Electronically Signed By: Olayinka Nickerson MD  April 21, 2017  2:41 PM

## 2017-04-21 NOTE — DISCHARGE INSTRUCTIONS
Post-Anesthesia Patient Instructions    IMMEDIATELY FOLLOWING SURGERY:  Do not drive or operate machinery for the first twenty four hours after surgery.  Do not make any important decisions for twenty four hours after surgery or while taking narcotic pain medications or sedatives.  If you develop intractable nausea and vomiting or a severe headache please notify your doctor immediately.    FOLLOW-UP:  Please make an appointment with your surgeon as instructed. You do not need to follow up with anesthesia unless specifically instructed to do so.    WOUND CARE INSTRUCTIONS (if applicable):  Keep a dry clean dressing on the anesthesia/puncture wound site if there is drainage.  Once the wound has quit draining you may leave it open to air.  Generally you should leave the bandage intact for twenty four hours unless there is drainage.  If the epidural site drains for more than 36-48 hours please call the anesthesia department.    QUESTIONS?:  Please feel free to call your physician or the hospital  if you have any questions, and they will be happy to assist you.           POST OPERATIVE PATIENT INFORMATION  Shoulder Surgery    DIET  No restrictions unless specifically ordered by your physician.  DISCOMFORT  You should have only minimal discomfort.  There may be some tenderness around your incision.  If you have a prescribed medication and it is not controlling your pain, please notify your doctor.  You may use ice to your shoulder for comfort as needed.  CARE OF INCISION  Staples/Stitches: If the staples/stitches were removed during your hospital stay and steri-strips (thin strips of tape) were applied to the incision, leave them on until you see your doctor or they loosen on their own.  If your doctor sends you home with the staples/stitches intact, keep the dressing dry.  If a clear plastic covering has been applied to the incision prior to discharge, leave it on until you return to see your doctor.  You may  shower as instructed by your physician.  ACTIVITY  Sling/Immobilizer: Continue to wear your sling or immobilizer at all times unless instructed otherwise.  Be sure the sling/immobilizer is properly positioned and elevates your arm.  Positioning: Position the head of your bed up with pillows.  A small pillow positioned behind your affected shoulder will also keep your shoulder from falling back and this decreases pain.  Keep your arm close to your side.  Exercises: Wiggle your fingers frequently to aid circulation.  Practice other exercises only if you were instructed in physical therapy or by your physician.  STOP EXERCISING IMMEDIATELY IF YOU HAVE SEVERE SHOULDER PAIN OR DISCOMFORT.  ACTIVITIES TO AVOID  Do not move your affected arm/shoulder away from your side unless instructed by your doctor.  This motion places stress on the surgical area.  Do not lie on your affected side unless your doctor says you can.  CONTACT YOUR DOCTOR  Contact your doctor if any of the following conditions occur:    Have loss of movement or sensation to your fingers or hand.    Notice redness, swelling or warmth around your incision.    Have more than a small amount of drainage.    Develop a fever of 100  or higher, or start having chills.    Have severe pain, unrelieved by the medication prescribed for you.    Fingers/hand become cold, blue or swollen  If you have any questions, call your doctor s office.       CODMAN S EXERCISE    GENTLE PASSIVE RANGE OF MOTION EXERCISE    Rock your body back and forth or in circles.    Let your arm hang at your side and swing like a pendulum.    Don t use arm muscles to move your arm, rather use the sway of your body.    Do this 3 times a day for 3 minutes.    *WHEN NOT EXERCISING KEEP ARM IN SLING!      Remove the scopolamine patch behind your left ear after 24 hours after application.   After removing the patch, wash your hands and the area behind your ear thoroughly with soap and water.   The  patch will still contain some medicine after use.   To avoid accidental contact or ingestion by children or pets, fold the used patch in half with the sticky side together and throw away in the trash out of the reach of children and pets.

## 2017-04-24 DIAGNOSIS — G89.29 CHRONIC RIGHT SHOULDER PAIN: ICD-10-CM

## 2017-04-24 DIAGNOSIS — M25.511 CHRONIC RIGHT SHOULDER PAIN: ICD-10-CM

## 2017-04-24 NOTE — TELEPHONE ENCOUNTER
percocet      Last Written Prescription Date: 4/21/17  Last Fill Quantity: 30,  # refills: 0   Last Office Visit with [facility], [facility] or [facility] prescribing provider: 4/12/17

## 2017-04-25 ENCOUNTER — TELEPHONE (OUTPATIENT)
Dept: ORTHOPEDICS | Facility: OTHER | Age: 57
End: 2017-04-25

## 2017-04-25 RX ORDER — OXYCODONE AND ACETAMINOPHEN 10; 325 MG/1; MG/1
1-2 TABLET ORAL EVERY 4 HOURS PRN
Qty: 30 TABLET | Refills: 0 | Status: SHIPPED | OUTPATIENT
Start: 2017-04-25 | End: 2017-06-01

## 2017-04-25 NOTE — TELEPHONE ENCOUNTER
Pt left message with scheduling wanting a call back about pain medication.  States the percocet where to strong. And wanted something new for pain.   Told pt if he was to bring in rest of medication Dr Farrell would re prescribe something else for him pt stated he threw away pain medication. Discussed with Dr Farrell and he will not prescribe more pain medication.    Pamela M Lechevalier LPN

## 2017-05-05 ENCOUNTER — TELEPHONE (OUTPATIENT)
Dept: ORTHOPEDICS | Facility: OTHER | Age: 57
End: 2017-05-05

## 2017-05-05 NOTE — TELEPHONE ENCOUNTER
Received telephone call from Yennifer that she had a voicemail from one of Dr. Farrell's patient stating he isn't getting pain medication.    I told Yennifer I would look into this. What I found out is there is a telephone encounter in chart stating back on 04/25/17 that patient called about medication and that Kell SANCHEZ addressed this.   I called Kell in [facility] and asked her if she could look into this and call the patient back and inform him of Dr. Farrell's message. Patient does have apt on Monday 05/8/17 and Kell stated she will have Dr. Farrell address that when he comes in.

## 2017-05-05 NOTE — TELEPHONE ENCOUNTER
"Patient states \"Swelling,  Burnig, stabbing pain, elevation, and pain started patient states \"  as soon as you I ran out my first prescription, he states have you ever had shoulder surgery, writer advised No but please advise that to continue rest ice elevation and tylenol over the weekend patient also advised alternate ibuprofen patient states ist bothers my stomach, patient again advise orthopedic Dr's are not in and writer will address with Dr. Farrell Monday and we will see you at post appointment Monday, patient agreed to plan.   Kirsten Ravi LPN  "

## 2017-05-08 ENCOUNTER — OFFICE VISIT (OUTPATIENT)
Dept: ORTHOPEDICS | Facility: OTHER | Age: 57
End: 2017-05-08
Attending: ORTHOPAEDIC SURGERY
Payer: MEDICARE

## 2017-05-08 VITALS
OXYGEN SATURATION: 98 % | BODY MASS INDEX: 25.77 KG/M2 | DIASTOLIC BLOOD PRESSURE: 108 MMHG | HEART RATE: 71 BPM | SYSTOLIC BLOOD PRESSURE: 174 MMHG | WEIGHT: 180 LBS | HEIGHT: 70 IN | TEMPERATURE: 97.2 F

## 2017-05-08 DIAGNOSIS — G89.29 CHRONIC RIGHT SHOULDER PAIN: Primary | ICD-10-CM

## 2017-05-08 DIAGNOSIS — M25.511 CHRONIC RIGHT SHOULDER PAIN: Primary | ICD-10-CM

## 2017-05-08 PROCEDURE — 99212 OFFICE O/P EST SF 10 MIN: CPT

## 2017-05-08 PROCEDURE — 99024 POSTOP FOLLOW-UP VISIT: CPT | Performed by: ORTHOPAEDIC SURGERY

## 2017-05-08 ASSESSMENT — PAIN SCALES - GENERAL: PAINLEVEL: MODERATE PAIN (5)

## 2017-05-08 NOTE — MR AVS SNAPSHOT
"              After Visit Summary   2017    Ramo Berry    MRN: [id number]           Patient Information     Date Of Birth          1960        Visit Information        Provider Department      2017 10:30 AM Talha Farrell, DO  ORTHOPEDICS         Follow-ups after your visit        Who to contact     If you have questions or need follow up information about today's clinic visit or your schedule please contact  ORTHOPEDICS directly at [phone].  Normal or non-critical lab and imaging results will be communicated to you by [employer], letter or phone within 4 business days after the clinic has received the results. If you do not hear from us within 7 days, please contact the clinic through [employer] or phone. If you have a critical or abnormal lab result, we will notify you by phone as soon as possible.  Submit refill requests through [employer] or call your pharmacy and they will forward the refill request to us. Please allow 3 business days for your refill to be completed.          Additional Information About Your Visit        [employer] Information     [employer] lets you send messages to your doctor, view your test results, renew your prescriptions, schedule appointments and more. To sign up, go to [email or web address] . Click on \"Log in\" on the left side of the screen, which will take you to the Welcome page. Then click on \"Sign up Now\" on the right side of the page.     You will be asked to enter the access code listed below, as well as some personal information. Please follow the directions to create your username and password.     Your access code is: [email or web address]  Expires: 2017  9:21 AM     Your access code will  in 90 days. If you need help or a new code, please call your [facility] clinic or [phone].        Care EveryWhere ID     This is your Care EveryWhere ID. This could be used by other organizations to access your [facility] medical records  [id number]        Your Vitals " "Were     Pulse Temperature Height Pulse Oximetry BMI (Body Mass Index)       71 97.2  F (36.2  C) (Tympanic) 5' 10\" (1.778 m) 98% 25.83 kg/m2        Blood Pressure from Last 3 Encounters:   05/08/17 (!) 174/108   04/21/17 156/97   04/12/17 136/80    Weight from Last 3 Encounters:   05/08/17 180 lb (81.6 kg)   04/12/17 188 lb (85.3 kg)   04/06/17 185 lb (83.9 kg)              Today, you had the following     No orders found for display       Primary Care Provider Office Phone # Fax #    Koffi MARTINEZ Jean-Baptiste,  [phone]       [facility] [address]  [place] [place] [address]        Thank you!     Thank you for choosing  ORTHOPEDICS  for your care. Our goal is always to provide you with excellent care. Hearing back from our patients is one way we can continue to improve our services. Please take a few minutes to complete the written survey that you may receive in the mail after your visit with us. Thank you!             Your Updated Medication List - Protect others around you: Learn how to safely use, store and throw away your medicines at www.disposemymeds.org.          This list is accurate as of: 5/8/17 10:40 AM.  Always use your most recent med list.                   Brand Name Dispense Instructions for use    AMLODIPINE BESYLATE PO      Take 5 mg by mouth 2 times daily       aspirin 325 MG tablet      Take 1 tablet by mouth every 4 hours as needed. For pain       CHLORTHALIDONE PO      Take 12.5 mg by mouth daily       lisinopril 40 MG tablet    PRINIVIL/ZESTRIL     Take 1 tablet by mouth daily.       METOPROLOL TARTRATE PO      Take 50 mg by mouth 2 times daily       omeprazole 20 MG CR capsule    priLOSEC    90 capsule    TAKE ONE CAPSULE BY MOUTH EVERY DAY BEFORE A MEAL.       oxyCODONE-acetaminophen  MG per tablet    PERCOCET    30 tablet    Take 1-2 tablets by mouth every 4 hours as needed for pain (moderate to severe)         "

## 2017-05-08 NOTE — PROGRESS NOTES
Patient presents today follow up 2 weeks after his right shoulder arthroscopy for rotator cuff tear and biceps tenodesis.  He complains of moderate pain, but otherwise is doing well.  He is wearing his sling, removing only for hygiene and very light household activities, and doing his Codman's exercises.    Physical exam: Wounds are clean and dry, neurovascularly intact right upper extremity.    Assessment and plan: Patient is doing well postoperatively with no evidence of complication or wound difficulties, he'll continue in his sling for approximately 2 more weeks.  Hydrocodone 5/325 was prescribed for his moderate pain he'll follow up in 2 weeks for repeat exam and referral to physical therapy.

## 2017-05-08 NOTE — NURSING NOTE
"Chief Complaint   Patient presents with     Surgical Followup     Right shoulder arthroscopy.       Initial BP (!) 174/108 (BP Location: Left arm, Patient Position: Chair, Cuff Size: Adult Regular)  Pulse 71  Temp 97.2  F (36.2  C) (Tympanic)  Ht 5' 10\" (1.778 m)  Wt 180 lb (81.6 kg)  SpO2 98%  BMI 25.83 kg/m2 Estimated body mass index is 25.83 kg/(m^2) as calculated from the following:    Height as of this encounter: 5' 10\" (1.778 m).    Weight as of this encounter: 180 lb (81.6 kg).  Medication Reconciliation: complete   Kaylee Vizcarra LPN      "

## 2017-05-17 NOTE — TELEPHONE ENCOUNTER
Patient called again and left voice mail that he has been trying to get this RX filled since Monday stated he called management and left message and nobody is taking care of this.     Kell can you check with Dr. Farrell and see if this can get taken care of.    Please call patient and let him know     Thank you

## 2017-05-17 NOTE — TELEPHONE ENCOUNTER
ivaniaco      Last Written Prescription Date: 5/8/17  Last Fill Quantity: 40,  # refills: 0   [place] [facility] [place] [facility] prescribing provider: 5/18/17                                         Next 5 appointments (look out 90 days)     May 22, 2017  9:45 AM CDT   (Arrive by 9:30 AM)   Return Visit with Talha Farrell DO    ORTHOPEDICS ([facility])    [address] [facility] [address] [phone]

## 2017-05-17 NOTE — TELEPHONE ENCOUNTER
Patient needs to take less of this medication, he was given an adequate supply to last to his next appointment, 22 may 2017

## 2017-05-18 DIAGNOSIS — M25.511 CHRONIC RIGHT SHOULDER PAIN: ICD-10-CM

## 2017-05-18 DIAGNOSIS — G89.29 CHRONIC RIGHT SHOULDER PAIN: ICD-10-CM

## 2017-05-18 RX ORDER — HYDROCODONE BITARTRATE AND ACETAMINOPHEN 5; 325 MG/1; MG/1
1-2 TABLET ORAL EVERY 4 HOURS PRN
Qty: 40 TABLET | Refills: 0 | Status: SHIPPED | OUTPATIENT
Start: 2017-05-18 | End: 2017-06-16

## 2017-05-18 RX ORDER — HYDROCODONE BITARTRATE AND ACETAMINOPHEN 5; 325 MG/1; MG/1
1-2 TABLET ORAL EVERY 4 HOURS PRN
Qty: 40 TABLET | Refills: 0 | Status: CANCELLED | OUTPATIENT
Start: 2017-05-18

## 2017-05-22 ENCOUNTER — OFFICE VISIT (OUTPATIENT)
Dept: ORTHOPEDICS | Facility: OTHER | Age: 57
End: 2017-05-22
Attending: ORTHOPAEDIC SURGERY
Payer: MEDICARE

## 2017-05-22 VITALS
OXYGEN SATURATION: 97 % | BODY MASS INDEX: 25.77 KG/M2 | TEMPERATURE: 96.7 F | HEART RATE: 81 BPM | SYSTOLIC BLOOD PRESSURE: 142 MMHG | DIASTOLIC BLOOD PRESSURE: 88 MMHG | WEIGHT: 180 LBS | HEIGHT: 70 IN

## 2017-05-22 DIAGNOSIS — M25.511 CHRONIC RIGHT SHOULDER PAIN: Primary | ICD-10-CM

## 2017-05-22 DIAGNOSIS — G89.29 CHRONIC RIGHT SHOULDER PAIN: Primary | ICD-10-CM

## 2017-05-22 PROCEDURE — 99212 OFFICE O/P EST SF 10 MIN: CPT

## 2017-05-22 PROCEDURE — 99024 POSTOP FOLLOW-UP VISIT: CPT | Performed by: ORTHOPAEDIC SURGERY

## 2017-05-22 ASSESSMENT — PAIN SCALES - GENERAL: PAINLEVEL: MILD PAIN (3)

## 2017-05-22 NOTE — PROGRESS NOTES
"Ramo presents today 4 weeks after his right rotator cuff repair.  He is doing quite well, his discomfort continues to improve, and already he is noticing that certain light activities that used to hurt, no longer painful.  His wound is clean and dry at the [place] is neurovascularly intact.    He will begin physical therapy next week and I'll order was given, he prefers to do this in the #as this is closer to his home.  He'll follow up in approximately 4 weeks to make certain that he is making satisfactory progress./88 (BP Location: Left arm, Patient Position: Chair, Cuff Size: Adult Regular)  Pulse 81  Temp 96.7  F (35.9  C) (Tympanic)  Ht 5' 10\" (1.778 m)  Wt 180 lb (81.6 kg)  SpO2 97%  BMI 25.83 kg/m2  "

## 2017-05-22 NOTE — NURSING NOTE
"Chief Complaint   Patient presents with     Surgical Followup     Follow up right shoulder surgery.       Initial /88 (BP Location: Left arm, Patient Position: Chair, Cuff Size: Adult Regular)  Pulse 81  Temp 96.7  F (35.9  C) (Tympanic)  Ht 5' 10\" (1.778 m)  Wt 180 lb (81.6 kg)  SpO2 97%  BMI 25.83 kg/m2 Estimated body mass index is 25.83 kg/(m^2) as calculated from the following:    Height as of this encounter: 5' 10\" (1.778 m).    Weight as of this encounter: 180 lb (81.6 kg).  Medication Reconciliation: complete   Kaylee Vizcarra LPN      "

## 2017-05-22 NOTE — MR AVS SNAPSHOT
After Visit Summary   5/22/2017    Ramo Berry    MRN: [id number]           Patient Information     Date Of Birth          1960        Visit Information        Provider Department      5/22/2017 9:45 AM Talha Farrell,   ORTHOPEDICS        Today's Diagnoses     Chronic right shoulder pain    -  1       Follow-ups after your visit        Additional Services     PHYSICAL THERAPY REFERRAL       *This therapy referral will be filtered to a centralized scheduling office at [facility] and the patient will receive a call to schedule an appointment at a [place] location most convenient for them. *     [facility] provides Physical Therapy evaluation and treatment and many specialty services across the [place] system.  If requesting a specialty program, please choose from the list below.    If you have not heard from the scheduling office within 2 business days, please call [phone] for all locations, with the exception of [place], please call [phone].  Treatment: Evaluation & Treatment  Special Instructions/Modalities: as indicated  Special Programs: ROM, Stregnth and coordination following surgery for rotator cuff repair    Please be aware that coverage of these services is subject to the terms and limitations of your health insurance plan.  Call member services at your health plan with any benefit or coverage questions.      **Note to Provider:  If you are referring outside of [place] for the therapy appointment, please list the name of the location in the  special instructions  above, print the referral and give to the patient to schedule the appointment.                  Who to contact     If you have questions or need follow up information about today's clinic visit or your schedule please contact  ORTHOPEDICS directly at [phone].  Normal or non-critical lab and imaging results will be communicated to you by MyChart, letter or phone  "within 4 business days after the clinic has received the results. If you do not hear from us within 7 days, please contact the clinic through [employer] or phone. If you have a critical or abnormal lab result, we will notify you by phone as soon as possible.  Submit refill requests through [employer] or call your pharmacy and they will forward the refill request to us. Please allow 3 business days for your refill to be completed.          Additional Information About Your Visit        [facility] Information     [employer] lets you send messages to your doctor, view your test results, renew your prescriptions, schedule appointments and more. To sign up, go to [email or web address] . Click on \"Log in\" on the left side of the screen, which will take you to the Welcome page. Then click on \"Sign up Now\" on the right side of the page.     You will be asked to enter the access code listed below, as well as some personal information. Please follow the directions to create your username and password.     Your access code is: [email or web address]  Expires: 2017  9:21 AM     Your access code will  in 90 days. If you need help or a new code, please call your [place] clinic or [phone].        Care EveryWhere ID     This is your Care EveryWhere ID. This could be used by other organizations to access your [place] medical records  [id number]        Your Vitals Were     Pulse Temperature Height Pulse Oximetry BMI (Body Mass Index)       81 96.7  F (35.9  C) (Tympanic) 5' 10\" (1.778 m) 97% 25.83 kg/m2        Blood Pressure from Last 3 Encounters:   17 142/88   17 (!) 174/108   17 156/97    Weight from Last 3 Encounters:   17 180 lb (81.6 kg)   17 180 lb (81.6 kg)   17 188 lb (85.3 kg)              We Performed the Following     PHYSICAL THERAPY REFERRAL        Primary Care Provider Office Phone # Fax #    Koffi Jean-Baptiste [phone]       [facility] [address] [place] [address]        Thank you!     Thank you for choosing  ORTHOPEDICS  for your care. Our goal is always to provide you with excellent care. Hearing back from our patients is one way we can continue to improve our services. Please take a few minutes to complete the written survey that you may receive in the mail after your visit with us. Thank you!             Your Updated Medication List - Protect others around you: Learn how to safely use, store and throw away your medicines at www.disposemymeds.org.          This list is accurate as of: 5/22/17  9:53 AM.  Always use your most recent med list.                   Brand Name Dispense Instructions for use    AMLODIPINE BESYLATE PO      Take 5 mg by mouth 2 times daily       aspirin 325 MG tablet      Take 1 tablet by mouth every 4 hours as needed. For pain       CHLORTHALIDONE PO      Take 12.5 mg by mouth daily       HYDROcodone-acetaminophen 5-325 MG per tablet    NORCO    40 tablet    Take 1-2 tablets by mouth every 4 hours as needed for moderate to severe pain maximum 4 tablet(s) per day       lisinopril 40 MG tablet    PRINIVIL/ZESTRIL     Take 1 tablet by mouth daily.       METOPROLOL TARTRATE PO      Take 50 mg by mouth 2 times daily       omeprazole 20 MG CR capsule    priLOSEC    90 capsule    TAKE ONE CAPSULE BY MOUTH EVERY DAY BEFORE A MEAL.       oxyCODONE-acetaminophen  MG per tablet    PERCOCET    30 tablet    Take 1-2 tablets by mouth every 4 hours as needed for pain (moderate to severe)

## 2017-05-26 ENCOUNTER — COMMUNICATION - GICH (OUTPATIENT)
Dept: FAMILY MEDICINE | Facility: OTHER | Age: 57
End: 2017-05-26

## 2017-05-26 DIAGNOSIS — I10 BENIGN ESSENTIAL HYPERTENSION: Primary | ICD-10-CM

## 2017-05-26 DIAGNOSIS — I10 ESSENTIAL (PRIMARY) HYPERTENSION: ICD-10-CM

## 2017-05-26 NOTE — TELEPHONE ENCOUNTER
Metoprolol       Last Written Prescription Date: Unknown   Last Fill Quantity: 270, # refills: 0  [facility] or [facility] prescribing provider: 5/22/2017  Next 5 appointments (look out 90 days)     Jun 01, 2017  9:15 AM CDT   (Arrive by 9:00 AM)   Office Visit with JUSTUS Santillan MD   [facility] ([facility])    [address] [facility] [address]   [phone]            Jun 26, 2017  9:30 AM CDT   (Arrive by 9:15 AM)   Return Visit with Talha Farrell DO    ORTHOPEDICS ([facility])    [address]  [facility] [id number] [phone]                   Potassium   Date Value Ref Range Status   04/12/2017 4.4 3.4 - 5.3 mmol/L Final     Creatinine   Date Value Ref Range Status   04/12/2017 1.33 (H) 0.66 - 1.25 mg/dL Final     BP Readings from Last 3 Encounters:   05/22/17 142/88   05/08/17 (!) 174/108   04/21/17 156/97

## 2017-05-30 ENCOUNTER — TRANSFERRED RECORDS (OUTPATIENT)
Dept: HEALTH INFORMATION MANAGEMENT | Facility: HOSPITAL | Age: 57
End: 2017-05-30

## 2017-05-30 RX ORDER — METOPROLOL TARTRATE 25 MG/1
50 TABLET, FILM COATED ORAL 2 TIMES DAILY
Qty: 60 TABLET | Refills: 3 | Status: SHIPPED | OUTPATIENT
Start: 2017-05-30 | End: 2017-06-01

## 2017-05-31 ENCOUNTER — TELEPHONE (OUTPATIENT)
Dept: ORTHOPEDICS | Facility: OTHER | Age: 57
End: 2017-05-31

## 2017-05-31 DIAGNOSIS — G89.29 CHRONIC RIGHT SHOULDER PAIN: ICD-10-CM

## 2017-05-31 DIAGNOSIS — M25.511 CHRONIC RIGHT SHOULDER PAIN: ICD-10-CM

## 2017-05-31 NOTE — TELEPHONE ENCOUNTER
Received from refill a patient request for NORCO 5-325 mg. I contacted patient's wife. She stated that he was lying down. Instructed patient's wife that Dr Farrell was out of office and will return tomorrow 06/01/17. At that time writer will submit refill request at that time. Wife states understanding. Request placed on Dr Farrell's desk for review.  Kaylee Vizcarra LPN

## 2017-06-01 ENCOUNTER — OFFICE VISIT (OUTPATIENT)
Dept: FAMILY MEDICINE | Facility: OTHER | Age: 57
End: 2017-06-01
Attending: INTERNAL MEDICINE
Payer: MEDICARE

## 2017-06-01 VITALS
DIASTOLIC BLOOD PRESSURE: 82 MMHG | HEIGHT: 70 IN | BODY MASS INDEX: 26.48 KG/M2 | HEART RATE: 73 BPM | OXYGEN SATURATION: 96 % | TEMPERATURE: 97 F | WEIGHT: 185 LBS | SYSTOLIC BLOOD PRESSURE: 140 MMHG

## 2017-06-01 DIAGNOSIS — I10 BENIGN ESSENTIAL HYPERTENSION: ICD-10-CM

## 2017-06-01 DIAGNOSIS — K21.9 GASTROESOPHAGEAL REFLUX DISEASE WITHOUT ESOPHAGITIS: Primary | ICD-10-CM

## 2017-06-01 DIAGNOSIS — Z71.89 ACP (ADVANCE CARE PLANNING): Chronic | ICD-10-CM

## 2017-06-01 DIAGNOSIS — R69 DIAGNOSIS UNKNOWN: ICD-10-CM

## 2017-06-01 PROCEDURE — 99213 OFFICE O/P EST LOW 20 MIN: CPT

## 2017-06-01 PROCEDURE — 99213 OFFICE O/P EST LOW 20 MIN: CPT | Performed by: FAMILY MEDICINE

## 2017-06-01 RX ORDER — METOPROLOL TARTRATE 25 MG/1
50 TABLET, FILM COATED ORAL 2 TIMES DAILY
Qty: 120 TABLET | Refills: 5 | Status: SHIPPED | OUTPATIENT
Start: 2017-06-01 | End: 2018-06-05

## 2017-06-01 RX ORDER — HYDROCODONE BITARTRATE AND ACETAMINOPHEN 5; 325 MG/1; MG/1
1-2 TABLET ORAL EVERY 4 HOURS PRN
Qty: 40 TABLET | Refills: 0 | Status: CANCELLED | OUTPATIENT
Start: 2017-06-01

## 2017-06-01 ASSESSMENT — PAIN SCALES - GENERAL: PAINLEVEL: NO PAIN (0)

## 2017-06-01 ASSESSMENT — ANXIETY QUESTIONNAIRES
6. BECOMING EASILY ANNOYED OR IRRITABLE: NOT AT ALL
GAD7 TOTAL SCORE: 4
3. WORRYING TOO MUCH ABOUT DIFFERENT THINGS: SEVERAL DAYS
IF YOU CHECKED OFF ANY PROBLEMS ON THIS QUESTIONNAIRE, HOW DIFFICULT HAVE THESE PROBLEMS MADE IT FOR YOU TO DO YOUR WORK, TAKE CARE OF THINGS AT HOME, OR GET ALONG WITH OTHER PEOPLE: NOT DIFFICULT AT ALL
5. BEING SO RESTLESS THAT IT IS HARD TO SIT STILL: NOT AT ALL
7. FEELING AFRAID AS IF SOMETHING AWFUL MIGHT HAPPEN: NOT AT ALL
1. FEELING NERVOUS, ANXIOUS, OR ON EDGE: SEVERAL DAYS
2. NOT BEING ABLE TO STOP OR CONTROL WORRYING: SEVERAL DAYS

## 2017-06-01 ASSESSMENT — PATIENT HEALTH QUESTIONNAIRE - PHQ9: 5. POOR APPETITE OR OVEREATING: SEVERAL DAYS

## 2017-06-01 NOTE — TELEPHONE ENCOUNTER
"Dr Farrell was notified and stated that patient's primary was addressing this issue. Meanwhile patient contacted our [facility] and needed a call back. Patient stated that he had his surgery about 5 weeks ago. Started physical therapy today and states he is in a lot of pain and would like to have NORCO refilled. I instructed patient since he was 5 weeks post surgery that he should start taking and alternating tylenol and Ibuprofen icing elevation and rest. He stated \" I have done all that and it doesn't help\". I was just about to instruct him that I would ask the provider again when patient hung up on me. I followed through and spoke with Dr Farrell and Dr Farrell stated that because he had just started PT that he would writ patient for NORCO but to use them sparingly. Made attempt to call patient back to inform him that he has a prescription he at clinic for him to . NO answer when called at 436 p.m...  Kaylee Vizcarra LPN    "

## 2017-06-01 NOTE — PROGRESS NOTES
[facility]    Ramo Berry, 57 year old, male presents with   Chief Complaint   Patient presents with     Establish Care     Needs PCP. Patient was last seen in [place].      Gastrophageal Reflux     Follow up medication, patient was switched to prilosec 1 tablet before a meal. alway took 2 tablets every morning. would like to go back on that dose.      Hypertension     Follow up medication recheck/refill request- Metoprolol. Denies any chest pain.        PAST MEDICAL HISTORY:  Past Medical History:   Diagnosis Date     Anxiety state, unspecified 09/20/2012     Back Pain 09/20/2012     Cervical disc disease 09/20/2012     Chronic pain syndrome 09/20/2012     Essential hypertension, benign 09/20/2012       PAST SURGICAL HISTORY:  Past Surgical History:   Procedure Laterality Date     ARTHROSCOPY SHOULDER, OPEN BICEP TENODESIS REPAIR, COMBINED Right 4/21/2017    Procedure: COMBINED ARTHROSCOPY SHOULDER, OPEN BICEP TENODESIS REPAIR;;  Surgeon: Talha Farrell DO;  Location: [facility] OR     ARTHROSCOPY SHOULDER, OPEN ROTATOR CUFF REPAIR, COMBINED Right 4/21/2017    Procedure: COMBINED ARTHROSCOPY SHOULDER, OPEN ROTATOR CUFF REPAIR;  RIGHT SHOULDER ARTHROSCOPY WITH Open  REPAIR OF ROTATOR CUFFand BICEP TENDODESIS;  Surgeon: Talha Farrell DO;  Location: [facility] OR     C6 C7 cervical fusion       cataract extraction and lens implantation      Bilateral      cystoscopy with biopsies      hematuria     fusion L5 S1       L4 L5 spinal fusion       MOUTH SURGERY       ORTHOPEDIC SURGERY Right 08/09/2016    rotator cuff surgery        MEDICATIONS:  Prior to Admission medications    Medication Sig Start Date End Date Taking? Authorizing Provider   omeprazole (PRILOSEC) 20 MG CR capsule Take 2 capsules (40 mg) by mouth daily 6/1/17  Yes JUSTUS Santillan MD   metoprolol (LOPRESSOR) 25 MG tablet Take 2 tablets (50 mg) by mouth 2 times daily 6/1/17  Yes JUSTUS Santillan MD   HYDROcodone-acetaminophen (NORCO) 5-325 MG  "per tablet Take 1-2 tablets by mouth every 4 hours as needed for moderate to severe pain maximum 4 tablet(s) per day 5/18/17  Yes Talha Farrell Curt,    CHLORTHALIDONE PO Take 12.5 mg by mouth daily   Yes Reported, Patient   AMLODIPINE BESYLATE PO Take 5 mg by mouth 2 times daily   Yes Reported, Patient   aspirin 325 MG tablet Take 1 tablet by mouth every 4 hours as needed. For pain   Yes Reported, Patient   lisinopril (PRINIVIL,ZESTRIL) 40 MG tablet Take 1 tablet by mouth daily.   Yes Reported, Patient       ALLERGIES:   No Known Allergies    ROS:  Constitutional, HEENT, cardiovascular, pulmonary, gi and gu systems are negative, except as otherwise noted.      EXAM:  /82 (BP Location: Left arm, Patient Position: Chair, Cuff Size: Adult Regular)  Pulse 73  Temp 97  F (36.1  C) (Tympanic)  Ht 5' 10\" (1.778 m)  Wt 185 lb (83.9 kg)  SpO2 96%  BMI 26.54 kg/m2 Body mass index is 26.54 kg/(m^2).   GENERAL APPEARANCE: healthy, alert and no distress  EYES: Eyes grossly normal to inspection, PERRL and conjunctivae and sclerae normal  RESP: lungs clear to auscultation - no rales, rhonchi or wheezes  CV: regular rates and rhythm, normal S1 S2, no S3 or S4 and no murmur, click or rub  Lab/ X-ray  No results found for this or any previous visit (from the past 24 hour(s)).    ASSESSMENT/PLAN:    ICD-10-CM    1. Gastroesophageal reflux disease without esophagitis K21.9 omeprazole (PRILOSEC) 20 MG CR capsule. Refilled. Discussed elevating the head of the bed.  Stressed the importance for him to quit smoking.  Avoid late night meals limit caffeine.   2. ACP (advance care planning) Z71.89    3. Diagnosis unknown R69    4. Benign essential hypertension I10 metoprolol (LOPRESSOR) 25 MG tablet prescription refilled.  We'll see him in 2 months for recheck blood pressure.  I told him I would not give him pain medicine.  He is followed by Dr. Ventura regarding recent shoulder surgery.  He did have a colonoscopy in July of 2015 and " an EGD in March of 2016         BALJEET Santillan MD  June 1, 2017

## 2017-06-01 NOTE — NURSING NOTE
"Chief Complaint   Patient presents with     Establish Care     Needs PCP. Patient was last seen in [place].      Gastrophageal Reflux     Follow up medication, patient was switched to prilosec 1 tablet before a meal. alway took 2 tablets every morning. would like to go back on that dose.      Hypertension     Follow up medication recheck/refill request- Metoprolol        Initial /82 (BP Location: Left arm, Patient Position: Chair, Cuff Size: Adult Regular)  Pulse 73  Temp 97  F (36.1  C) (Tympanic)  Ht 5' 10\" (1.778 m)  Wt 185 lb (83.9 kg)  SpO2 96%  BMI 26.54 kg/m2 Estimated body mass index is 26.54 kg/(m^2) as calculated from the following:    Height as of this encounter: 5' 10\" (1.778 m).    Weight as of this encounter: 185 lb (83.9 kg).  Medication Reconciliation: complete     CATRACHITO WHARTON      "

## 2017-06-01 NOTE — TELEPHONE ENCOUNTER
norco      Last Written Prescription Date: 5/18/17  Last Fill Quantity: 40,  # refills: 0   Last Office Visit [facility] prescribing provider: 6/1/17                                         Next 5 appointments (look out 90 days)     Jun 26, 2017  9:30 AM CDT   (Arrive by 9:15 AM)   Return Visit with Talha Farrell DO    ORTHOPEDICS ([facility])    [address] [facility] [address] [phone]            Aug 01, 2017  9:30 AM CDT   (Arrive by 9:15 AM)   Office Visit with JUSTUS Santillan MD   [facility] ([facility])    [address] [facility] [address]   [phone]

## 2017-06-01 NOTE — MR AVS SNAPSHOT
"              After Visit Summary   6/1/2017    Ramo Berry    MRN: [id number]           Patient Information     Date Of Birth          1960        Visit Information        Provider Department      6/1/2017 9:15 AM JUSTUS Santillan MD [facility]        Today's Diagnoses     Gastroesophageal reflux disease without esophagitis    -  1    ACP (advance care planning)        Diagnosis unknown        Benign essential hypertension          Care Instructions    Return in 2 months          Follow-ups after your visit        Follow-up notes from your care team     Return in about 2 months (around 8/1/2017).      Your next 10 appointments already scheduled     Jun 26, 2017  9:30 AM CDT   (Arrive by 9:15 AM)   Return Visit with Talha Farrell DO    ORTHOPEDICS ([facility])    [address] [facility] [address] [phone]              Who to contact     If you have questions or need follow up information about today's clinic visit or your schedule please contact [facility] directly at [phone].  Normal or non-critical lab and imaging results will be communicated to you by [email or web address], letter or phone within 4 business days after the clinic has received the results. If you do not hear from us within 7 days, please contact the clinic through [email or web address] or phone. If you have a critical or abnormal lab result, we will notify you by phone as soon as possible.  Submit refill requests through [employer] or call your pharmacy and they will forward the refill request to us. Please allow 3 business days for your refill to be completed.          Additional Information About Your Visit        [email or web address] Information     [employer] lets you send messages to your doctor, view your test results, renew your prescriptions, schedule appointments and more. To sign up, go to [email or web address] . Click on \"Log in\" on the left side of the screen, which will take you to the Welcome page. Then click on " "\"Sign up Now\" on the right side of the page.     You will be asked to enter the access code listed below, as well as some personal information. Please follow the directions to create your username and password.     Your access code is: [email or web address]  Expires: 2017  9:21 AM     Your access code will  in 90 days. If you need help or a new code, please call your [facility] or [phone].        Care EveryWhere ID     This is your Care EveryWhere ID. This could be used by other organizations to access your [place] medical records  [id number]        Your Vitals Were     Pulse Temperature Height Pulse Oximetry BMI (Body Mass Index)       73 97  F (36.1  C) (Tympanic) 5' 10\" (1.778 m) 96% 26.54 kg/m2        Blood Pressure from Last 3 Encounters:   17 140/82   17 142/88   17 (!) 174/108    Weight from Last 3 Encounters:   17 185 lb (83.9 kg)   17 180 lb (81.6 kg)   17 180 lb (81.6 kg)              Today, you had the following     No orders found for display         Today's Medication Changes          These changes are accurate as of: 17  9:18 AM.  If you have any questions, ask your nurse or doctor.               These medicines have changed or have updated prescriptions.        Dose/Directions    omeprazole 20 MG CR capsule   Commonly known as:  priLOSEC   This may have changed:    - how much to take  - how to take this  - when to take this  - additional instructions   Used for:  Gastroesophageal reflux disease without esophagitis   Changed by:  JUSTUS Santillan MD        Dose:  40 mg   Take 2 capsules (40 mg) by mouth daily   Quantity:  120 capsule   Refills:  5         Stop taking these medicines if you haven't already. Please contact your care team if you have questions.     oxyCODONE-acetaminophen  MG per tablet   Commonly known as:  PERCOCET   Stopped by:  JUSTUS Santillan MD                Where to get your medicines      These medications were sent to " [id number] - [place], [place] - [address]  [address] , [place] [address]     Phone:  [phone]     metoprolol 25 MG tablet    omeprazole 20 MG CR capsule                Primary Care Provider Office Phone # Fax #    Koffi Jean-Baptiste [phone]       [facility] [address] [place] [address]        Thank you!     Thank you for choosing [facility]  for your care. Our goal is always to provide you with excellent care. Hearing back from our patients is one way we can continue to improve our services. Please take a few minutes to complete the written survey that you may receive in the mail after your visit with us. Thank you!             Your Updated Medication List - Protect others around you: Learn how to safely use, store and throw away your medicines at www.disposemymeds.org.          This list is accurate as of: 6/1/17  9:18 AM.  Always use your most recent med list.                   Brand Name Dispense Instructions for use    AMLODIPINE BESYLATE PO      Take 5 mg by mouth 2 times daily       aspirin 325 MG tablet      Take 1 tablet by mouth every 4 hours as needed. For pain       CHLORTHALIDONE PO      Take 12.5 mg by mouth daily       HYDROcodone-acetaminophen 5-325 MG per tablet    NORCO    40 tablet    Take 1-2 tablets by mouth every 4 hours as needed for moderate to severe pain maximum 4 tablet(s) per day       lisinopril 40 MG tablet    PRINIVIL/ZESTRIL     Take 1 tablet by mouth daily.       metoprolol 25 MG tablet    LOPRESSOR    120 tablet    Take 2 tablets (50 mg) by mouth 2 times daily       omeprazole 20 MG CR capsule    priLOSEC    120 capsule    Take 2 capsules (40 mg) by mouth daily

## 2017-06-02 ENCOUNTER — TELEPHONE (OUTPATIENT)
Dept: ORTHOPEDICS | Facility: OTHER | Age: 57
End: 2017-06-02

## 2017-06-02 ASSESSMENT — ANXIETY QUESTIONNAIRES: GAD7 TOTAL SCORE: 4

## 2017-06-02 ASSESSMENT — PATIENT HEALTH QUESTIONNAIRE - PHQ9: SUM OF ALL RESPONSES TO PHQ QUESTIONS 1-9: 3

## 2017-06-02 NOTE — TELEPHONE ENCOUNTER
Second attempt to contact patient to let him no I have hard copy for NORCO for him. Provider states thats prescription is good until 4:00p.m. today.No answer.  Kaylee Vizcarra LPN

## 2017-06-16 DIAGNOSIS — M25.511 CHRONIC RIGHT SHOULDER PAIN: ICD-10-CM

## 2017-06-16 DIAGNOSIS — G89.29 CHRONIC RIGHT SHOULDER PAIN: ICD-10-CM

## 2017-06-16 RX ORDER — HYDROCODONE BITARTRATE AND ACETAMINOPHEN 5; 325 MG/1; MG/1
1-2 TABLET ORAL EVERY 4 HOURS PRN
Qty: 10 TABLET | Refills: 0 | Status: SHIPPED | OUTPATIENT
Start: 2017-06-16 | End: 2017-06-21

## 2017-06-16 RX ORDER — HYDROCODONE BITARTRATE AND ACETAMINOPHEN 5; 325 MG/1; MG/1
1-2 TABLET ORAL EVERY 4 HOURS PRN
Qty: 40 TABLET | Refills: 0 | OUTPATIENT
Start: 2017-06-16

## 2017-06-16 NOTE — TELEPHONE ENCOUNTER
Patient will get scheduled with Ruslan Wynn next week notified patient he will be given #10, will walk script to . Also confirmed with pharmacy patient received #40 on 06/02/2017  CATRACHITO WHARTON

## 2017-06-16 NOTE — TELEPHONE ENCOUNTER
norco      Last Written Prescription Date: 5/18/17- last filled by Dr. Farrell  Last Fill Quantity: 40,  # refills: 0   Last Office Visit with [facility], [facility] or [facility] prescribing provider: 6/1/17                                         Next 5 appointments (look out 90 days)     Jun 22, 2017  9:40 AM CDT   (Arrive by 9:20 AM)   Return Visit with Talha Farrell DO    ORTHOPEDICS ([facility] )    [address] [facility] [address]   [phone]            Aug 01, 2017  9:30 AM CDT   (Arrive by 9:15 AM)   Office Visit with JUSTUS Santillan MD   [facility]    [address]  [facility] [address]   [phone]

## 2017-06-21 DIAGNOSIS — M25.511 CHRONIC RIGHT SHOULDER PAIN: ICD-10-CM

## 2017-06-21 DIAGNOSIS — G89.29 CHRONIC RIGHT SHOULDER PAIN: ICD-10-CM

## 2017-06-21 NOTE — TELEPHONE ENCOUNTER
norco      Last Written Prescription Date: 6/16/17  Last Fill Quantity: 10,  # refills: 0   Last Office Visit with G, [facility] or [facility] prescribing provider: 6/1/17                                         Next 5 appointments (look out 90 days)     Jun 22, 2017  2:00 PM CDT   (Arrive by 1:45 PM)   Return Visit with Ruslan Wynn PA-C    ORTHOPEDICS ([facility] )    [address] [facility] [address]   [phone]            Aug 01, 2017  9:30 AM CDT   (Arrive by 9:15 AM)   Office Visit with JUSTUS Santillan MD   [facility] ([facility] )    [address]  [facility] [address]   [phone]

## 2017-06-22 RX ORDER — HYDROCODONE BITARTRATE AND ACETAMINOPHEN 5; 325 MG/1; MG/1
1-2 TABLET ORAL EVERY 4 HOURS PRN
Qty: 10 TABLET | Refills: 0 | Status: SHIPPED | OUTPATIENT
Start: 2017-06-22 | End: 2017-08-01

## 2017-06-26 ENCOUNTER — OFFICE VISIT (OUTPATIENT)
Dept: ORTHOPEDICS | Facility: OTHER | Age: 57
End: 2017-06-26
Attending: ORTHOPAEDIC SURGERY
Payer: MEDICARE

## 2017-06-26 VITALS
DIASTOLIC BLOOD PRESSURE: 82 MMHG | OXYGEN SATURATION: 96 % | BODY MASS INDEX: 25.77 KG/M2 | HEIGHT: 70 IN | TEMPERATURE: 97.5 F | WEIGHT: 180 LBS | HEART RATE: 77 BPM | SYSTOLIC BLOOD PRESSURE: 142 MMHG

## 2017-06-26 DIAGNOSIS — G89.29 CHRONIC RIGHT SHOULDER PAIN: ICD-10-CM

## 2017-06-26 DIAGNOSIS — M25.511 CHRONIC RIGHT SHOULDER PAIN: ICD-10-CM

## 2017-06-26 DIAGNOSIS — M25.519 ARTHRALGIA OF SHOULDER, UNSPECIFIED LATERALITY: Primary | ICD-10-CM

## 2017-06-26 PROCEDURE — 99212 OFFICE O/P EST SF 10 MIN: CPT

## 2017-06-26 PROCEDURE — 99024 POSTOP FOLLOW-UP VISIT: CPT | Performed by: ORTHOPAEDIC SURGERY

## 2017-06-26 RX ORDER — HYDROCODONE BITARTRATE AND ACETAMINOPHEN 5; 325 MG/1; MG/1
1-2 TABLET ORAL EVERY 4 HOURS PRN
Qty: 60 TABLET | Refills: 0 | Status: SHIPPED | OUTPATIENT
Start: 2017-06-26 | End: 2017-08-03

## 2017-06-26 ASSESSMENT — PAIN SCALES - GENERAL: PAINLEVEL: MILD PAIN (3)

## 2017-06-26 NOTE — NURSING NOTE
"Chief Complaint   Patient presents with     RECHECK     Follow up right shoulder Patient requesting NORCO as is going out of town for 2 weeks.       Initial /82 (BP Location: Left arm, Patient Position: Sitting, Cuff Size: Adult Regular)  Pulse 77  Temp 97.5  F (36.4  C) (Tympanic)  Ht 5' 10\" (1.778 m)  Wt 180 lb (81.6 kg)  SpO2 96%  BMI 25.83 kg/m2 Estimated body mass index is 25.83 kg/(m^2) as calculated from the following:    Height as of this encounter: 5' 10\" (1.778 m).    Weight as of this encounter: 180 lb (81.6 kg).  Medication Reconciliation: complete   Kaylee Vizcarra LPN      "

## 2017-06-26 NOTE — MR AVS SNAPSHOT
"              After Visit Summary   6/26/2017    Ramo Berry    MRN: [id number]           Patient Information     Date Of Birth          1960        Visit Information        Provider Department      6/26/2017 9:00 AM Talha Farrell, DO  OR[facility]        Today's Diagnoses     Arthralgia of shoulder, unspecified laterality    -  1       Follow-ups after your visit        Your next 10 appointments already scheduled     Aug 01, 2017  9:30 AM CDT   (Arrive by 9:15 AM)   Office Visit with JUSTUS Santillan MD   [facility] [place] ([facility] [place] )    [address]  [place] [address]   [phone]           Bring a current list of meds and any records pertaining to this visit.  For Physicals, please bring immunization records and any forms needing to be filled out.    Please arrive 15 minutes early to complete paperwork and register.              Who to contact     If you have questions or need follow up information about today's clinic visit or your schedule please contact  ORTHOPEDICS directly at [phone].  Normal or non-critical lab and imaging results will be communicated to you by MyChart, letter or phone within 4 business days after the clinic has received the results. If you do not hear from us within 7 days, please contact the clinic through [employer] or phone. If you have a critical or abnormal lab result, we will notify you by phone as soon as possible.  Submit refill requests through [employer] or call your pharmacy and they will forward the refill request to us. Please allow 3 business days for your refill to be completed.          Additional Information About Your Visit        MyChart Information     [employer] lets you send messages to your doctor, view your test results, renew your prescriptions, schedule appointments and more. To sign up, go to [email or web address] . Click on \"Log in\" on the left side of the screen, which will take you to the Welcome page. Then click " "on \"Sign up Now\" on the right side of the page.     You will be asked to enter the access code listed below, as well as some personal information. Please follow the directions to create your username and password.     Your access code is: [email or web address]  Expires: 2017  9:50 AM     Your access code will  in 90 days. If you need help or a new code, please call your [facility] clinic or [phone].        Care EveryWhere ID     This is your Care EveryWhere ID. This could be used by other organizations to access your [facility] medical records  [id number]        Your Vitals Were     Pulse Temperature Height Pulse Oximetry BMI (Body Mass Index)       77 97.5  F (36.4  C) (Tympanic) 5' 10\" (1.778 m) 96% 25.83 kg/m2        Blood Pressure from Last 3 Encounters:   17 142/82   17 140/82   17 142/88    Weight from Last 3 Encounters:   17 180 lb (81.6 kg)   17 185 lb (83.9 kg)   17 180 lb (81.6 kg)              Today, you had the following     No orders found for display         Today's Medication Changes          These changes are accurate as of: 17  9:50 AM.  If you have any questions, ask your nurse or doctor.               These medicines have changed or have updated prescriptions.        Dose/Directions    * HYDROcodone-acetaminophen 5-325 MG per tablet   Commonly known as:  NORCO   This may have changed:  Another medication with the same name was added. Make sure you understand how and when to take each.   Used for:  Chronic right shoulder pain   Changed by:  JUSTUS Santillan MD        Dose:  1-2 tablet   Take 1-2 tablets by mouth every 4 hours as needed for moderate to severe pain maximum 4 tablet(s) per day   Quantity:  10 tablet   Refills:  0       * HYDROcodone-acetaminophen 5-325 MG per tablet   Commonly known as:  NORCO   This may have changed:  You were already taking a medication with the same name, and this prescription was added. Make sure you understand how and " when to take each.   Used for:  Arthralgia of shoulder, unspecified laterality   Changed by:  Talha Farrell DO        Dose:  1-2 tablet   Take 1-2 tablets by mouth every 4 hours as needed for moderate to severe pain maximum 4 tablet(s) per day   Quantity:  60 tablet   Refills:  0       * Notice:  This list has 2 medication(s) that are the same as other medications prescribed for you. Read the directions carefully, and ask your doctor or other care provider to review them with you.         Where to get your medicines      Some of these will need a paper prescription and others can be bought over the counter.  Ask your nurse if you have questions.     Bring a paper prescription for each of these medications     HYDROcodone-acetaminophen 5-325 MG per tablet                Primary Care Provider Office Phone # Fax #    R Nayan Santillan [phone]       [facility] [place] [address]        Equal Access to Services     CAITLYN SONI : Hadii jolene carrillo hadasho Somaria elena, waaxda luqadaha, qaybta kaalmada adeegyada, waxay amaury hayann pate . So [facility] [phone].    ATENCIÓN: Si habla español, tiene a blackwell disposición servicios gratuitos de asistencia lingüística. [facility] [phone].    We comply with applicable federal civil rights laws and [place] laws. We do not discriminate on the basis of race, color, national origin, age, disability sex, sexual orientation or gender identity.            Thank you!     Thank you for choosing  ORTHOPEDICS  for your care. Our goal is always to provide you with excellent care. Hearing back from our patients is one way we can continue to improve our services. Please take a few minutes to complete the written survey that you may receive in the mail after your visit with us. Thank you!             Your Updated Medication List - Protect others around you: Learn how to safely use, store and throw away your medicines at  www.disposemymeds.org.          This list is accurate as of: 6/26/17  9:50 AM.  Always use your most recent med list.                   Brand Name Dispense Instructions for use Diagnosis    AMLODIPINE BESYLATE PO      Take 5 mg by mouth 2 times daily        aspirin 325 MG tablet      Take 1 tablet by mouth every 4 hours as needed. For pain        CHLORTHALIDONE PO      Take 12.5 mg by mouth daily        * HYDROcodone-acetaminophen 5-325 MG per tablet    NORCO    10 tablet    Take 1-2 tablets by mouth every 4 hours as needed for moderate to severe pain maximum 4 tablet(s) per day    Chronic right shoulder pain       * HYDROcodone-acetaminophen 5-325 MG per tablet    NORCO    60 tablet    Take 1-2 tablets by mouth every 4 hours as needed for moderate to severe pain maximum 4 tablet(s) per day    Arthralgia of shoulder, unspecified laterality       lisinopril 40 MG tablet    PRINIVIL/ZESTRIL     Take 1 tablet by mouth daily.        metoprolol 25 MG tablet    LOPRESSOR    120 tablet    Take 2 tablets (50 mg) by mouth 2 times daily    Benign essential hypertension       omeprazole 20 MG CR capsule    priLOSEC    120 capsule    Take 2 capsules (40 mg) by mouth daily    Gastroesophageal reflux disease without esophagitis       * Notice:  This list has 2 medication(s) that are the same as other medications prescribed for you. Read the directions carefully, and ask your doctor or other care provider to review them with you.

## 2017-06-26 NOTE — PROGRESS NOTES
"Ramo presents today for follow-up after his physical therapy for his right shoulder.  He has requested start range of motion, has restored activities of daily living strength but is still troubled by night pain as well as chronic pain from his previous spine surgery and chronic sciatica.  He is quite pleased with results of his surgery thus far and would like to continue physical therapy.    Physical exam: Patient has full range of motion of his right shoulder now, and grade 4 strength in all planes.  He is neurovascularly intact with a stable glenohumeral joint and stable acromioclavicular joint.    Assessment and plan: Assessment is he is recovered very well as far as function is concerned pain control remains an issue and he does have other diagnoses contributing to his chronic pain.  His Norco was refilled today and he will be out of town for the next 2 weeks.  At his next visit we will consider referring him for chronic pain management./82 (BP Location: Left arm, Patient Position: Sitting, Cuff Size: Adult Regular)  Pulse 77  Temp 97.5  F (36.4  C) (Tympanic)  Ht 5' 10\" (1.778 m)  Wt 180 lb (81.6 kg)  SpO2 96%  BMI 25.83 kg/m2  "

## 2017-06-27 ENCOUNTER — TRANSFERRED RECORDS (OUTPATIENT)
Dept: HEALTH INFORMATION MANAGEMENT | Facility: HOSPITAL | Age: 57
End: 2017-06-27

## 2017-06-30 ENCOUNTER — TELEPHONE (OUTPATIENT)
Dept: ORTHOPEDICS | Facility: OTHER | Age: 57
End: 2017-06-30

## 2017-06-30 NOTE — TELEPHONE ENCOUNTER
Dr. Farrell, when you last saw this patient it looks like you were going to refer him to a pain clinic. I don't see no referral. Is this something you still wanted to do??    Please address.  Thank you

## 2017-07-11 ENCOUNTER — TELEPHONE (OUTPATIENT)
Dept: ORTHOPEDICS | Facility: OTHER | Age: 57
End: 2017-07-11

## 2017-07-11 NOTE — TELEPHONE ENCOUNTER
Patient request for refill of NORCO  Medication. Last refilled on 06/26/2017 qty 60. Patient was instructed to use them sparingly and follow up with Dr Farrell at which time Dr Farrell would re evaluate pain and from last provider note  States may send to pain management. Left message for patient to return call.  Kaylee Vizcarra LPN

## 2017-07-12 NOTE — TELEPHONE ENCOUNTER
Late entry July 11, 2017: Patient returned my phone call .Patient stated he wanted a refill of his NORCO pain medication. I instructed patient that Dr Farrell was not in office until July 26 2017. Instructed patient to follow up with his primary for refills. Patient stated that he (primary) won't refill his NORCO as [facility] wasn't writing for pain medications anymore. Patient stated that he understands that Dr Farrell was going to refer him to pain clinic in [place] but he didn't have the time nor the finances to drive back and forth to any [place] appointments. Patient also stated that he was not happy with his treatment here at [facility]. I reassured patient that his health was in our best interest but that my hands were tired as far as pain medications. Patient upset.  Kaylee Vizcarra LPN

## 2017-07-14 ENCOUNTER — TELEPHONE (OUTPATIENT)
Dept: ORTHOPEDICS | Facility: OTHER | Age: 57
End: 2017-07-14

## 2017-07-14 NOTE — TELEPHONE ENCOUNTER
Patient notified that Dr. Hernandez can not help him at this time and needs to f/u with Dr. Farrell when her returns from vacation patient notified by writer to call and set appointment if he does not have one with Dr. Farrell and given number to call to make appointment patient advised to go to UC if needed or reach out to primary Dr. Kirsten Ravi, NADER

## 2017-07-14 NOTE — TELEPHONE ENCOUNTER
Right shoulder pain and RTC repair DOS Dr. Farrell 2 months ago, this is second surgery for Right shoulder and 3/10 @ this time, but at night pain 5/10 - 10/10, patient states I have did all my PT and I dont understand why it is not getting any better?Please advise.   Kirsten Ravi LPN

## 2017-07-17 ENCOUNTER — COMMUNICATION - GICH (OUTPATIENT)
Dept: FAMILY MEDICINE | Facility: OTHER | Age: 57
End: 2017-07-17

## 2017-07-17 ENCOUNTER — HOSPITAL ENCOUNTER (EMERGENCY)
Facility: HOSPITAL | Age: 57
Discharge: HOME | End: 2017-07-18
Attending: EMERGENCY MEDICINE | Admitting: EMERGENCY MEDICINE
Payer: MEDICARE

## 2017-07-17 DIAGNOSIS — I10 BENIGN ESSENTIAL HYPERTENSION: Primary | ICD-10-CM

## 2017-07-17 PROCEDURE — 93010 ELECTROCARDIOGRAM REPORT: CPT | Performed by: INTERNAL MEDICINE

## 2017-07-17 PROCEDURE — 99284 EMERGENCY DEPT VISIT MOD MDM: CPT | Mod: 25

## 2017-07-17 PROCEDURE — 99285 EMERGENCY DEPT VISIT HI MDM: CPT | Performed by: EMERGENCY MEDICINE

## 2017-07-17 NOTE — ED AVS SNAPSHOT
[facility]    [address] [address]    Phone:  [phone]                                       Ramo Berry   MRN: [id number]    Department:  [facility]   Date of Visit:  7/17/2017           Patient Information     Date Of Birth          1960        Your diagnoses for this visit were:     Benign essential hypertension        You were seen by Marcus Swenson MD.      Follow-up Information     Follow up with JUSTUS Santillan MD In 2 days.    Specialty:  Family Practice    Contact information:    [address] [address]  [phone]          Discharge Instructions         Discharge Instructions: Taking Your Blood Pressure  Blood pressure is the force of blood as it moves from the heart through the blood vessels. You can take your own blood pressure reading using a digital monitor. Take readings as often as your healthcare provider instructs. Take your readings each time in the same way, using the same arm. Here are guidelines for taking your blood pressure.  The American Heart Association (AHA) recommends purchasing a blood pressure monitor that is validated and approved by the Association for the Advancement of Medical Instrumentation, the [place] Hypertension Society, and the International Protocol for the Validation of Automated BP Measuring Devices. If the blood pressure monitor is for a senior adult, a pregnant woman, or a child, make certain it is validated for use with such a population. For the most reliable readings, the AHA recommends an automatic, cuff-style, upper arm (bicep) monitor. The readings from finger and wrist monitors are not as reliable as the upper arm monitor.        Step 1. Relax      Wait at least a half hour after smoking, eating, or exercising. Do not drink coffee, tea, soda, or other caffeinated beverages before checking your blood pressure.     Sit comfortably at a table. Place the  monitor near you.    Rest for a few minutes before you begin.        Step 2. Wrap the cuff      Place your arm on the table, palm up. Put your arm in a position that is level with your heart. Wrap the cuff around your upper arm, about an inch above your elbow. It s best to wrap the cuff on bare skin, not over clothing.    Make sure your cuff fits. If it doesn t wrap around your upper arm, order a larger cuff. A cuff that is too large or too small can result in an inaccurate blood pressure reading.           Step 3. Inflate the cuff      Pump the cuff until the scale reads 200. If you have a self-inflating cuff, push the button that starts the pump.    The cuff will tighten, then loosen.    The numbers will change. When they stop changing, your blood pressure reading will appear.    If you get a reading that is too high or too low for you, relax for a few minutes. Then do the test again.    Step 4. Write down the results    Write down your blood pressure numbers. Sudarshan the date and time. Keep your results in one place, such as a notebook.    Remove the cuff from your arm. Turn off the machine.    Take the readings with you to your medical appointments.    If you start a new blood pressure medicine, or change a blood pressure medicine dose, note the day you started the new drug or dosage on your blood pressure recording sheet. This will help your healthcare provider monitor the effect of medication changes.     Date Last Reviewed: 4/27/2016 2000-2017 [employer]. [address] [place] [address]. All rights reserved. This information is not intended as a substitute for professional medical care. Always follow your healthcare professional's instructions.          Future Appointments        Provider Department Dept Phone Center    8/1/2017 9:30 AM JUSTUS Santillan MD [facility] [phone] [facility]         Review of your medicines      START taking        Dose / Directions Last  dose taken    cloNIDine 0.1 MG tablet   Commonly known as:  CATAPRES   Dose:  0.1 mg   Quantity:  60 tablet        Take 1 tablet (0.1 mg) by mouth 2 times daily   Refills:  1          Our records show that you are taking the medicines listed below. If these are incorrect, please call your family doctor or clinic.        Dose / Directions Last dose taken    AMLODIPINE BESYLATE PO   Dose:  5 mg        Take 5 mg by mouth 2 times daily   Refills:  0        aspirin 325 MG tablet   Dose:  1 tablet        Take 1 tablet by mouth every 4 hours as needed. For pain   Refills:  0        CHLORTHALIDONE PO   Dose:  12.5 mg        Take 12.5 mg by mouth daily   Refills:  0        * HYDROcodone-acetaminophen 5-325 MG per tablet   Commonly known as:  NORCO   Dose:  1-2 tablet   Quantity:  10 tablet        Take 1-2 tablets by mouth every 4 hours as needed for moderate to severe pain maximum 4 tablet(s) per day   Refills:  0        * HYDROcodone-acetaminophen 5-325 MG per tablet   Commonly known as:  NORCO   Dose:  1-2 tablet   Quantity:  60 tablet        Take 1-2 tablets by mouth every 4 hours as needed for moderate to severe pain maximum 4 tablet(s) per day   Refills:  0        lisinopril 40 MG tablet   Commonly known as:  PRINIVIL/ZESTRIL   Dose:  1 tablet        Take 1 tablet by mouth daily.   Refills:  0        metoprolol 25 MG tablet   Commonly known as:  LOPRESSOR   Dose:  50 mg   Quantity:  120 tablet        Take 2 tablets (50 mg) by mouth 2 times daily   Refills:  5        omeprazole 20 MG CR capsule   Commonly known as:  priLOSEC   Dose:  40 mg   Quantity:  120 capsule        Take 2 capsules (40 mg) by mouth daily   Refills:  5        * Notice:  This list has 2 medication(s) that are the same as other medications prescribed for you. Read the directions carefully, and ask your doctor or other care provider to review them with you.            Prescriptions were sent or printed at these locations (1 Prescription)                  "  [id number] - [place] - [address]   [address] [address]    Telephone:  [phone]   Fax:  [phone]   Hours:                  E-Prescribed (1 of 1)         cloNIDine (CATAPRES) 0.1 MG tablet                Procedures and tests performed during your visit     CBC with platelets differential    Comprehensive metabolic panel    EKG 12 lead    TSH    Troponin I      Orders Needing Specimen Collection     None      Pending Results     No orders found for last 3 day(s).            Pending Culture Results     No orders found for last 3 day(s).            Thank you for choosing [facility]       Thank you for choosing [facility] for your care. Our goal is always to provide you with excellent care. Hearing back from our patients is one way we can continue to improve our services. Please take a few minutes to complete the written survey that you may receive in the mail after you visit with us. Thank you!        [employer] Information     [employer] lets you send messages to your doctor, view your test results, renew your prescriptions, schedule appointments and more. To sign up, go to [email or web address] . Click on \"Log in\" on the left side of the screen, which will take you to the Welcome page. Then click on \"Sign up Now\" on the right side of the page.     You will be asked to enter the access code listed below, as well as some personal information. Please follow the directions to create your username and password.     Your access code is: [email or web address]  Expires: 2017  9:50 AM     Your access code will  in 90 days. If you need help or a new code, please call your [facility] clinic or [phone].        Care EveryWhere ID     This is your Care EveryWhere ID. This could be used by other organizations to access your [facility] medical records  [id number]        Equal Access to Services     MADALYN SONI AH: brian Rolon qaybta kaalmada adeegyada, waxay " amaury pate ah. So [facility] [phone].    ATENCIÓN: Si habla español, tiene a blackwell disposición servicios gratuitos de asistencia lingüística. Llame al [phone].    We comply with applicable federal civil rights laws and [place] laws. We do not discriminate on the basis of race, color, national origin, age, disability sex, sexual orientation or gender identity.            After Visit Summary       This is your record. Keep this with you and show to your community pharmacist(s) and doctor(s) at your next visit.

## 2017-07-17 NOTE — ED AVS SNAPSHOT
[facility]    [address] [address]    Phone:  [phone]                                       Ramo Berry   MRN: [id number]    Department:  [facility]   Date of Visit:  7/17/2017           After Visit Summary Signature Page     I have received my discharge instructions, and my questions have been answered. I have discussed any challenges I see with this plan with the nurse or doctor.    ..........................................................................................................................................  Patient/Patient Representative Signature      ..........................................................................................................................................  Patient Representative Print Name and Relationship to Patient    ..................................................               ................................................  Date                                            Time    ..........................................................................................................................................  Reviewed by Signature/Title    ...................................................              ..............................................  Date                                                            Time

## 2017-07-18 VITALS
TEMPERATURE: 97.2 F | RESPIRATION RATE: 16 BRPM | OXYGEN SATURATION: 95 % | SYSTOLIC BLOOD PRESSURE: 158 MMHG | DIASTOLIC BLOOD PRESSURE: 116 MMHG

## 2017-07-18 LAB
ALBUMIN SERPL-MCNC: 3.9 G/DL (ref 3.4–5)
ALP SERPL-CCNC: 50 U/L (ref 40–150)
ALT SERPL W P-5'-P-CCNC: 12 U/L (ref 0–70)
ANION GAP SERPL CALCULATED.3IONS-SCNC: 10 MMOL/L (ref 3–14)
AST SERPL W P-5'-P-CCNC: 12 U/L (ref 0–45)
BASOPHILS # BLD AUTO: 0.1 10E9/L (ref 0–0.2)
BASOPHILS NFR BLD AUTO: 1 %
BILIRUB SERPL-MCNC: 0.2 MG/DL (ref 0.2–1.3)
BUN SERPL-MCNC: 17 MG/DL (ref 7–30)
CALCIUM SERPL-MCNC: 9.1 MG/DL (ref 8.5–10.1)
CHLORIDE SERPL-SCNC: 110 MMOL/L (ref 94–109)
CO2 SERPL-SCNC: 24 MMOL/L (ref 20–32)
CREAT SERPL-MCNC: 1.04 MG/DL (ref 0.66–1.25)
DIFFERENTIAL METHOD BLD: ABNORMAL
EOSINOPHIL # BLD AUTO: 0.5 10E9/L (ref 0–0.7)
EOSINOPHIL NFR BLD AUTO: 5.4 %
ERYTHROCYTE [DISTWIDTH] IN BLOOD BY AUTOMATED COUNT: 15.7 % (ref 10–15)
GFR SERPL CREATININE-BSD FRML MDRD: 74 ML/MIN/1.7M2
GLUCOSE SERPL-MCNC: 97 MG/DL (ref 70–99)
HCT VFR BLD AUTO: 43.6 % (ref 40–53)
HGB BLD-MCNC: 14.3 G/DL (ref 13.3–17.7)
IMM GRANULOCYTES # BLD: 0 10E9/L (ref 0–0.4)
IMM GRANULOCYTES NFR BLD: 0.2 %
LYMPHOCYTES # BLD AUTO: 2 10E9/L (ref 0.8–5.3)
LYMPHOCYTES NFR BLD AUTO: 23.5 %
MCH RBC QN AUTO: 29.1 PG (ref 26.5–33)
MCHC RBC AUTO-ENTMCNC: 32.8 G/DL (ref 31.5–36.5)
MCV RBC AUTO: 89 FL (ref 78–100)
MONOCYTES # BLD AUTO: 0.9 10E9/L (ref 0–1.3)
MONOCYTES NFR BLD AUTO: 11.3 %
NEUTROPHILS # BLD AUTO: 4.9 10E9/L (ref 1.6–8.3)
NEUTROPHILS NFR BLD AUTO: 58.6 %
NRBC # BLD AUTO: 0 10*3/UL
NRBC BLD AUTO-RTO: 0 /100
PLATELET # BLD AUTO: 330 10E9/L (ref 150–450)
POTASSIUM SERPL-SCNC: 3.6 MMOL/L (ref 3.4–5.3)
PROT SERPL-MCNC: 7.6 G/DL (ref 6.8–8.8)
RBC # BLD AUTO: 4.91 10E12/L (ref 4.4–5.9)
SODIUM SERPL-SCNC: 144 MMOL/L (ref 133–144)
TROPONIN I SERPL-MCNC: NORMAL UG/L (ref 0–0.04)
TSH SERPL DL<=0.05 MIU/L-ACNC: 3.52 MU/L (ref 0.4–4)
WBC # BLD AUTO: 8.3 10E9/L (ref 4–11)

## 2017-07-18 PROCEDURE — 25000132 ZZH RX MED GY IP 250 OP 250 PS 637: Mod: GY | Performed by: EMERGENCY MEDICINE

## 2017-07-18 PROCEDURE — 36415 COLL VENOUS BLD VENIPUNCTURE: CPT | Performed by: EMERGENCY MEDICINE

## 2017-07-18 PROCEDURE — 96374 THER/PROPH/DIAG INJ IV PUSH: CPT

## 2017-07-18 PROCEDURE — 25000128 H RX IP 250 OP 636: Performed by: EMERGENCY MEDICINE

## 2017-07-18 PROCEDURE — A9270 NON-COVERED ITEM OR SERVICE: HCPCS | Mod: GY | Performed by: EMERGENCY MEDICINE

## 2017-07-18 PROCEDURE — 84484 ASSAY OF TROPONIN QUANT: CPT | Performed by: EMERGENCY MEDICINE

## 2017-07-18 PROCEDURE — 84443 ASSAY THYROID STIM HORMONE: CPT | Performed by: EMERGENCY MEDICINE

## 2017-07-18 PROCEDURE — 85025 COMPLETE CBC W/AUTO DIFF WBC: CPT | Performed by: EMERGENCY MEDICINE

## 2017-07-18 PROCEDURE — 80053 COMPREHEN METABOLIC PANEL: CPT | Performed by: EMERGENCY MEDICINE

## 2017-07-18 PROCEDURE — 93005 ELECTROCARDIOGRAM TRACING: CPT

## 2017-07-18 RX ORDER — CLONIDINE HYDROCHLORIDE 0.1 MG/1
0.1 TABLET ORAL ONCE
Status: COMPLETED | OUTPATIENT
Start: 2017-07-18 | End: 2017-07-18

## 2017-07-18 RX ORDER — LABETALOL HYDROCHLORIDE 5 MG/ML
20 INJECTION, SOLUTION INTRAVENOUS ONCE
Status: COMPLETED | OUTPATIENT
Start: 2017-07-18 | End: 2017-07-18

## 2017-07-18 RX ORDER — CLONIDINE HYDROCHLORIDE 0.1 MG/1
0.1 TABLET ORAL 2 TIMES DAILY
Qty: 60 TABLET | Refills: 1 | Status: SHIPPED | OUTPATIENT
Start: 2017-07-18 | End: 2017-08-01 | Stop reason: SINTOL

## 2017-07-18 RX ADMIN — LABETALOL HYDROCHLORIDE 20 MG: 5 INJECTION INTRAVENOUS at 01:44

## 2017-07-18 RX ADMIN — CLONIDINE HYDROCHLORIDE 0.1 MG: 0.1 TABLET ORAL at 00:55

## 2017-07-18 ASSESSMENT — ENCOUNTER SYMPTOMS
DIZZINESS: 0
HEMATURIA: 0
FATIGUE: 0
FEVER: 0
PALPITATIONS: 0
HYPERTENSION: 1
NERVOUS/ANXIOUS: 1
NECK PAIN: 0
SHORTNESS OF BREATH: 0
CONFUSION: 0
HEADACHES: 0

## 2017-07-18 NOTE — ED NOTES
Patient to ED with spouse with reports of not taking all of his BP meds d/t cost. Patient reports his BP have been high for about the past month, denies headache occasionally has blurred vision denies currently. Denies chest pains or pressures.  Patient is alert and ornt.  Patient has attempted to follow up with PCP to get meds changed for cost wise but PCP is unavailable. Patient also reports with his BP being increased his anxiety is getting worse too. Patient reports hx of anxiety and used to take Klonopin for it
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
## 2017-07-18 NOTE — ED NOTES
D/c instructions reviewed with patient and his S.O. Rx e-scribed to pharmacy of choice.  Encouraged to return with new or worsening symptoms. No questions or concerns.

## 2017-07-18 NOTE — DISCHARGE INSTRUCTIONS
Discharge Instructions: Taking Your Blood Pressure  Blood pressure is the force of blood as it moves from the heart through the blood vessels. You can take your own blood pressure reading using a digital monitor. Take readings as often as your healthcare provider instructs. Take your readings each time in the same way, using the same arm. Here are guidelines for taking your blood pressure.  The American Heart Association (AHA) recommends purchasing a blood pressure monitor that is validated and approved by the Association for the Advancement of Medical Instrumentation, the [place] Hypertension [employer], and the International Protocol for the Validation of Automated BP Measuring Devices. If the blood pressure monitor is for a senior adult, a pregnant woman, or a child, make certain it is validated for use with such a population. For the most reliable readings, the AHA recommends an automatic, cuff-style, upper arm (bicep) monitor. The readings from finger and wrist monitors are not as reliable as the upper arm monitor.        Step 1. Relax      Wait at least a half hour after smoking, eating, or exercising. Do not drink coffee, tea, soda, or other caffeinated beverages before checking your blood pressure.     Sit comfortably at a table. Place the monitor near you.    Rest for a few minutes before you begin.        Step 2. Wrap the cuff      Place your arm on the table, palm up. Put your arm in a position that is level with your heart. Wrap the cuff around your upper arm, about an inch above your elbow. It s best to wrap the cuff on bare skin, not over clothing.    Make sure your cuff fits. If it doesn t wrap around your upper arm, order a larger cuff. A cuff that is too large or too small can result in an inaccurate blood pressure reading.           Step 3. Inflate the cuff      Pump the cuff until the scale reads 200. If you have a self-inflating cuff, push the button that starts the pump.    The cuff will  tighten, then loosen.    The numbers will change. When they stop changing, your blood pressure reading will appear.    If you get a reading that is too high or too low for you, relax for a few minutes. Then do the test again.    Step 4. Write down the results    Write down your blood pressure numbers. Sudarshan the date and time. Keep your results in one place, such as a notebook.    Remove the cuff from your arm. Turn off the machine.    Take the readings with you to your medical appointments.    If you start a new blood pressure medicine, or change a blood pressure medicine dose, note the day you started the new drug or dosage on your blood pressure recording sheet. This will help your healthcare provider monitor the effect of medication changes.     Date Last Reviewed: 4/27/2016 2000-2017 [employer]. [address] [place] [address], [place] [address]. All rights reserved. This information is not intended as a substitute for professional medical care. Always follow your healthcare professional's instructions.

## 2017-07-20 DIAGNOSIS — I10 BENIGN ESSENTIAL HYPERTENSION: Primary | ICD-10-CM

## 2017-07-20 RX ORDER — AMLODIPINE BESYLATE 10 MG/1
10 TABLET ORAL DAILY
Qty: 90 TABLET | Refills: 0 | Status: SHIPPED | OUTPATIENT
Start: 2017-07-20 | End: 2017-11-02

## 2017-07-20 RX ORDER — CHLORTHALIDONE 25 MG/1
12.5 TABLET ORAL DAILY
Qty: 135 TABLET | Refills: 0 | Status: SHIPPED | OUTPATIENT
Start: 2017-07-20 | End: 2017-08-01

## 2017-07-20 NOTE — TELEPHONE ENCOUNTER
Norvasc and Chlorthalidone   Last visit: 6.1.17  Last refill: Patient reported medications never filled in Epic. Please advise. Thank you  PCP VIANNEY Santillan

## 2017-08-01 ENCOUNTER — OFFICE VISIT (OUTPATIENT)
Dept: FAMILY MEDICINE | Facility: OTHER | Age: 57
End: 2017-08-01
Attending: FAMILY MEDICINE
Payer: MEDICARE

## 2017-08-01 VITALS
HEART RATE: 72 BPM | HEIGHT: 70 IN | DIASTOLIC BLOOD PRESSURE: 96 MMHG | BODY MASS INDEX: 26.92 KG/M2 | WEIGHT: 188 LBS | SYSTOLIC BLOOD PRESSURE: 162 MMHG

## 2017-08-01 DIAGNOSIS — I10 ESSENTIAL HYPERTENSION, BENIGN: Primary | ICD-10-CM

## 2017-08-01 PROCEDURE — 99213 OFFICE O/P EST LOW 20 MIN: CPT | Performed by: FAMILY MEDICINE

## 2017-08-01 PROCEDURE — 99212 OFFICE O/P EST SF 10 MIN: CPT

## 2017-08-01 RX ORDER — HYDROCHLOROTHIAZIDE 12.5 MG/1
12.5 TABLET ORAL DAILY
Qty: 30 TABLET | Refills: 3 | Status: SHIPPED | OUTPATIENT
Start: 2017-08-01 | End: 2017-12-07

## 2017-08-01 ASSESSMENT — ANXIETY QUESTIONNAIRES
7. FEELING AFRAID AS IF SOMETHING AWFUL MIGHT HAPPEN: SEVERAL DAYS
4. TROUBLE RELAXING: MORE THAN HALF THE DAYS
5. BEING SO RESTLESS THAT IT IS HARD TO SIT STILL: SEVERAL DAYS
IF YOU CHECKED OFF ANY PROBLEMS ON THIS QUESTIONNAIRE, HOW DIFFICULT HAVE THESE PROBLEMS MADE IT FOR YOU TO DO YOUR WORK, TAKE CARE OF THINGS AT HOME, OR GET ALONG WITH OTHER PEOPLE: SOMEWHAT DIFFICULT
6. BECOMING EASILY ANNOYED OR IRRITABLE: NOT AT ALL
1. FEELING NERVOUS, ANXIOUS, OR ON EDGE: MORE THAN HALF THE DAYS
GAD7 TOTAL SCORE: 10
3. WORRYING TOO MUCH ABOUT DIFFERENT THINGS: MORE THAN HALF THE DAYS
2. NOT BEING ABLE TO STOP OR CONTROL WORRYING: MORE THAN HALF THE DAYS

## 2017-08-01 ASSESSMENT — PAIN SCALES - GENERAL: PAINLEVEL: NO PAIN (1)
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
## 2017-08-01 NOTE — MR AVS SNAPSHOT
After Visit Summary   8/1/2017    Ramo Berry    MRN: [id number]           Patient Information     Date Of Birth          1960        Visit Information        Provider Department      8/1/2017 9:30 AM JUSTUS Santillan MD [facility]        Today's Diagnoses     Essential hypertension, benign    -  1      Care Instructions    Return in 2 weeks          Follow-ups after your visit        Follow-up notes from your care team     Return in about 2 weeks (around 8/15/2017).      Your next 10 appointments already scheduled     Aug 01, 2017  9:30 AM CDT   (Arrive by 9:15 AM)   Office Visit with JUSTUS Santillan MD   [facility] ([facility] )    [address]  [facility] [address]   [phone]           Bring a current list of meds and any records pertaining to this visit.  For Physicals, please bring immunization records and any forms needing to be filled out.  Please arrive 15 minutes early to complete paperwork and register.              Who to contact     If you have questions or need follow up information about today's clinic visit or your schedule please contact [facility] directly at [phone].  Normal or non-critical lab and imaging results will be communicated to you by MyChart, letter or phone within 4 business days after the clinic has received the results. If you do not hear from us within 7 days, please contact the clinic through MyChart or phone. If you have a critical or abnormal lab result, we will notify you by phone as soon as possible.  Submit refill requests through [employer] or call your pharmacy and they will forward the refill request to us. Please allow 3 business days for your refill to be completed.          Additional Information About Your Visit        MyChart Information     [employer] lets you send messages to your doctor, view your test results, renew your prescriptions, schedule appointments and more. To sign up,  "go to [email or web address] . Click on \"Log in\" on the left side of the screen, which will take you to the Welcome page. Then click on \"Sign up Now\" on the right side of the page.     You will be asked to enter the access code listed below, as well as some personal information. Please follow the directions to create your username and password.     Your access code is: [email or web address]  Expires: 2017  9:50 AM     Your access code will  in 90 days. If you need help or a new code, please call your [facility] clinic or [phone].        Care EveryWhere ID     This is your Care EveryWhere ID. This could be used by other organizations to access your [facility] medical records  [id number]        Your Vitals Were     Pulse Height BMI (Body Mass Index)             72 5' 10\" (1.778 m) 26.98 kg/m2          Blood Pressure from Last 3 Encounters:   17 (!) 162/96   17 (!) 158/116   17 142/82    Weight from Last 3 Encounters:   17 188 lb (85.3 kg)   17 180 lb (81.6 kg)   17 185 lb (83.9 kg)              Today, you had the following     No orders found for display         Today's Medication Changes          These changes are accurate as of: 17  9:14 AM.  If you have any questions, ask your nurse or doctor.               Start taking these medicines.        Dose/Directions    hydrochlorothiazide 12.5 MG Tabs tablet   Used for:  Essential hypertension, benign   Started by:  JUSTUS Santillan MD        Dose:  12.5 mg   Take 1 tablet (12.5 mg) by mouth daily   Quantity:  30 tablet   Refills:  3         These medicines have changed or have updated prescriptions.        Dose/Directions    HYDROcodone-acetaminophen 5-325 MG per tablet   Commonly known as:  NORCO   This may have changed:  Another medication with the same name was removed. Continue taking this medication, and follow the directions you see here.   Used for:  Arthralgia of shoulder, unspecified laterality   Changed by:  Bud" Talha Elias DO        Dose:  1-2 tablet   Take 1-2 tablets by mouth every 4 hours as needed for moderate to severe pain maximum 4 tablet(s) per day   Quantity:  60 tablet   Refills:  0         Stop taking these medicines if you haven't already. Please contact your care team if you have questions.     cloNIDine 0.1 MG tablet   Commonly known as:  CATAPRES   Stopped by:  JUSTUS Santillan MD                Where to get your medicines      These medications were sent to [facility] - [place], [place] - [address]   [address] , [facility] [address]     Phone:  [phone]     hydrochlorothiazide 12.5 MG Tabs tablet                Primary Care Provider Office Phone # Fax #    JUSTUS Santillan [phone]       [facility] [address] [place] [address]        Equal Access to Services     [facility]: Hadii jolene ku hadasho Soomaali, waaxda luqadaha, qaybta kaalmada adeegyada, arin rebolledo hayaaventura pate . So [facility] [phone].    ATENCIÓN: Si habla español, tiene a blackwell disposición servicios gratuitos de asistencia lingüística. Llame al [phone].    We comply with applicable federal civil rights laws and [place] laws. We do not discriminate on the basis of race, color, national origin, age, disability sex, sexual orientation or gender identity.            Thank you!     Thank you for choosing [facility]  for your care. Our goal is always to provide you with excellent care. Hearing back from our patients is one way we can continue to improve our services. Please take a few minutes to complete the written survey that you may receive in the mail after your visit with us. Thank you!             Your Updated Medication List - Protect others around you: Learn how to safely use, store and throw away your medicines at www.disposemymeds.org.          This list is accurate as of: 8/1/17  9:14 AM.  Always use your most recent med list.                   Brand  Name Dispense Instructions for use Diagnosis    amLODIPine 10 MG tablet    NORVASC    90 tablet    Take 1 tablet (10 mg) by mouth daily    Benign essential hypertension       aspirin 325 MG tablet      Take 1 tablet by mouth every 4 hours as needed. For pain        hydrochlorothiazide 12.5 MG Tabs tablet     30 tablet    Take 1 tablet (12.5 mg) by mouth daily    Essential hypertension, benign       HYDROcodone-acetaminophen 5-325 MG per tablet    NORCO    60 tablet    Take 1-2 tablets by mouth every 4 hours as needed for moderate to severe pain maximum 4 tablet(s) per day    Arthralgia of shoulder, unspecified laterality       lisinopril 40 MG tablet    PRINIVIL/ZESTRIL     Take 1 tablet by mouth daily.        metoprolol 25 MG tablet    LOPRESSOR    120 tablet    Take 2 tablets (50 mg) by mouth 2 times daily    Benign essential hypertension       omeprazole 20 MG CR capsule    priLOSEC    120 capsule    Take 2 capsules (40 mg) by mouth daily    Gastroesophageal reflux disease without esophagitis

## 2017-08-01 NOTE — NURSING NOTE
"Chief Complaint   Patient presents with     Hypertension       Initial BP (!) 162/96  Pulse 72  Ht 5' 10\" (1.778 m)  Wt 188 lb (85.3 kg)  BMI 26.98 kg/m2 Estimated body mass index is 26.98 kg/(m^2) as calculated from the following:    Height as of this encounter: 5' 10\" (1.778 m).    Weight as of this encounter: 188 lb (85.3 kg).  Medication Reconciliation: complete   Keara Mason    "

## 2017-08-02 DIAGNOSIS — M25.519 ARTHRALGIA OF SHOULDER, UNSPECIFIED LATERALITY: ICD-10-CM

## 2017-08-02 RX ORDER — HYDROCODONE BITARTRATE AND ACETAMINOPHEN 5; 325 MG/1; MG/1
1-2 TABLET ORAL EVERY 4 HOURS PRN
Qty: 60 TABLET | Refills: 0 | Status: CANCELLED | OUTPATIENT
Start: 2017-08-02

## 2017-08-02 ASSESSMENT — ANXIETY QUESTIONNAIRES: GAD7 TOTAL SCORE: 10

## 2017-08-02 ASSESSMENT — PATIENT HEALTH QUESTIONNAIRE - PHQ9: SUM OF ALL RESPONSES TO PHQ QUESTIONS 1-9: 4

## 2017-08-02 NOTE — TELEPHONE ENCOUNTER
norco      Last Written Prescription Date: 6/26/17  Last Fill Quantity: 60,  # refills: 0   Last Office Visit with [facility], [facility] or [facility] prescribing provider: 8/1/17                                         Next 5 appointments (look out 90 days)     Aug 15, 2017 10:00 AM CDT   (Arrive by 9:45 AM)   Office Visit with JUSTUS Santillan MD   [facility] ([facility] )    [address] [place] [address]   [phone]

## 2017-08-03 ENCOUNTER — HOSPITAL ENCOUNTER (EMERGENCY)
Facility: HOSPITAL | Age: 57
Discharge: HOME | End: 2017-08-03
Attending: NURSE PRACTITIONER | Admitting: NURSE PRACTITIONER
Payer: MEDICARE

## 2017-08-03 VITALS
DIASTOLIC BLOOD PRESSURE: 106 MMHG | OXYGEN SATURATION: 97 % | SYSTOLIC BLOOD PRESSURE: 152 MMHG | TEMPERATURE: 97.3 F | RESPIRATION RATE: 16 BRPM

## 2017-08-03 DIAGNOSIS — G89.21 CHRONIC PAIN DUE TO TRAUMA: Primary | ICD-10-CM

## 2017-08-03 DIAGNOSIS — K04.7 DENTAL INFECTION: ICD-10-CM

## 2017-08-03 PROCEDURE — 96372 THER/PROPH/DIAG INJ SC/IM: CPT

## 2017-08-03 PROCEDURE — 40000268 ZZH STATISTIC NO CHARGES

## 2017-08-03 PROCEDURE — 64400 NJX AA&/STRD TRIGEMINAL NRV: CPT

## 2017-08-03 PROCEDURE — 99214 OFFICE O/P EST MOD 30 MIN: CPT | Mod: 25

## 2017-08-03 PROCEDURE — 99214 OFFICE O/P EST MOD 30 MIN: CPT | Performed by: NURSE PRACTITIONER

## 2017-08-03 RX ORDER — AMOXICILLIN 500 MG/1
500 CAPSULE ORAL 3 TIMES DAILY
Qty: 21 CAPSULE | Refills: 0 | Status: SHIPPED | OUTPATIENT
Start: 2017-08-03 | End: 2017-08-10

## 2017-08-03 RX ORDER — HYDROCODONE BITARTRATE AND ACETAMINOPHEN 5; 325 MG/1; MG/1
1-2 TABLET ORAL EVERY 4 HOURS PRN
Qty: 20 TABLET | Refills: 0 | Status: SHIPPED | OUTPATIENT
Start: 2017-08-03 | End: 2017-08-14

## 2017-08-03 ASSESSMENT — ENCOUNTER SYMPTOMS
VOMITING: 0
APPETITE CHANGE: 0
ACTIVITY CHANGE: 0
DIARRHEA: 0
COUGH: 0
FEVER: 0
FACIAL SWELLING: 1
TROUBLE SWALLOWING: 0
PSYCHIATRIC NEGATIVE: 1
NAUSEA: 0
CHILLS: 0

## 2017-08-03 NOTE — ED AVS SNAPSHOT
[facility]    [address]    [place] [address]    Phone:  [phone]                                       Ramo Berry   MRN: [id number]    Department:  [facility]   Date of Visit:  8/3/2017           Patient Information     Date Of Birth          1960        Your diagnoses for this visit were:     Dental infection        You were seen by Alma Almeida NP.      Follow-up Information     Follow up with JUSTUS Santillan MD.    Specialty:  Family Practice    Why:  As needed, If symptoms worsen    Contact information:    [facility] [place]  [address]  [place] [address]  [phone]          Follow up with [facility].    Specialty:  EMERGENCY MEDICINE    Why:  As needed, If symptoms worsen    Contact information:    [address]  [place] [address] [phone]    Additional information:    From [facility]: Take [address]. Turn left at [address]. Turn left at the first stoplight on [address]. At the first stop sign, take a right onto [address]. Take a left into the parking lot and continue through until you reach the North enterance of the building.       From [place]: Take [address]. Take the [address] ramp towards [place]. Turn left onto [address]. Take a slight right onto [address]. Turn left at the first stoplight on [address]. At the first stop sign, take a right onto [address]. Take a left into the parking lot and continue through until you reach the North enterance of the building.       From [place]: Take [address]. Take a right at [address]. At the first stop sign, take a right onto [address]. Take a left into the parking lot and continue through until you reach the North enterance of the building.         Discharge Instructions       Take antibiotics as ordered.   Eat a yogurt daily while taking antibiotics.    Take tylenol and or ibuprofen for pain. Follow dosing on package.   Good mouth hygiene. Brush teeth twice a day.  Follow up with dentist in 1 week.   Follow up with PCP with any increase in symptoms or concerns.   Return to urgent care or emergency department with any increase in symptoms or concerns.    Discharge References/Attachments     ABSCESS, DENTAL (ENGLISH)      Future Appointments        Provider Department Dept Phone Center    8/15/2017 10:00 AM JUSTUS Santillan MD [facility] [phone] [facility]         Review of your medicines      START taking        Dose / Directions Last dose taken    amoxicillin 500 MG capsule   Commonly known as:  AMOXIL   Dose:  500 mg   Quantity:  21 capsule        Take 1 capsule (500 mg) by mouth 3 times daily for 7 days   Refills:  0          Our records show that you are taking the medicines listed below. If these are incorrect, please call your family doctor or clinic.        Dose / Directions Last dose taken    amLODIPine 10 MG tablet   Commonly known as:  NORVASC   Dose:  10 mg   Quantity:  90 tablet        Take 1 tablet (10 mg) by mouth daily   Refills:  0        aspirin 325 MG tablet   Dose:  1 tablet        Take 1 tablet by mouth every 4 hours as needed. For pain   Refills:  0        hydrochlorothiazide 12.5 MG Tabs tablet   Dose:  12.5 mg   Quantity:  30 tablet        Take 1 tablet (12.5 mg) by mouth daily   Refills:  3        lisinopril 40 MG tablet   Commonly known as:  PRINIVIL/ZESTRIL   Dose:  1 tablet        Take 1 tablet by mouth daily.   Refills:  0        metoprolol 25 MG tablet   Commonly known as:  LOPRESSOR   Dose:  50 mg   Quantity:  120 tablet        Take 2 tablets (50 mg) by mouth 2 times daily   Refills:  5        omeprazole 20 MG CR capsule   Commonly known as:  priLOSEC   Dose:  40 mg   Quantity:  120 capsule        Take 2 capsules (40 mg) by mouth daily   Refills:  5                Prescriptions were sent or printed at these  "locations (1 Prescription)                   [facility] - [place], [place] - [address] [address]   [address] , [place] [address]    Telephone:  [phone]   Fax:  [phone]   Hours:                  E-Prescribed (1 of 1)         amoxicillin (AMOXIL) 500 MG capsule                Orders Needing Specimen Collection     None      Pending Results     No orders found from 2017 to 2017.            Pending Culture Results     No orders found from 2017 to 2017.            Thank you for choosing [facility]       Thank you for choosing [facility] for your care. Our goal is always to provide you with excellent care. Hearing back from our patients is one way we can continue to improve our services. Please take a few minutes to complete the written survey that you may receive in the mail after you visit with us. Thank you!        [employer] Information     [employer] lets you send messages to your doctor, view your test results, renew your prescriptions, schedule appointments and more. To sign up, go to [email or web address] . Click on \"Log in\" on the left side of the screen, which will take you to the Welcome page. Then click on \"Sign up Now\" on the right side of the page.     You will be asked to enter the access code listed below, as well as some personal information. Please follow the directions to create your username and password.     Your access code is: [email or web address]  Expires: 2017  9:50 AM     Your access code will  in 90 days. If you need help or a new code, please call your [facility] clinic or [phone].        Care EveryWhere ID     This is your Care EveryWhere ID. This could be used by other organizations to access your [facility] medical records  [id number]        Equal Access to Services     [facility] ZACHARIAH : Cindy Bustamante, brian diaz, arin grant. So [facility] [phone].    ATENCIÓN: Si renatala español, tiene a blackwell " disposición servicios gratuitos de asistencia lingüística. Lltabby al [phone].    We comply with applicable federal civil rights laws and [place] laws. We do not discriminate on the basis of race, color, national origin, age, disability sex, sexual orientation or gender identity.            After Visit Summary       This is your record. Keep this with you and show to your community pharmacist(s) and doctor(s) at your next visit.

## 2017-08-03 NOTE — TELEPHONE ENCOUNTER
Note: Norco last filled by  on 6.26.17, #60. Last office visit on 8.1.17.Medication pended.Thank you

## 2017-08-03 NOTE — DISCHARGE INSTRUCTIONS
Take antibiotics as ordered.   Eat a yogurt daily while taking antibiotics.   Take tylenol and or ibuprofen for pain. Follow dosing on package.   Good mouth hygiene. Brush teeth twice a day.  Follow up with dentist in 1 week.   Follow up with PCP with any increase in symptoms or concerns.   Return to urgent care or emergency department with any increase in symptoms or concerns.

## 2017-08-03 NOTE — ED PROVIDER NOTES
"  History     Chief Complaint   Patient presents with     Dental Pain     lt sided dental painh     The history is provided by the patient. No  was used.     Ramo Berry is a 57 year old male who presents with dental pain that has been coming and going for the past \"few\" months. Increase in pain and swelling started yesterday. He's taken Anbesol with mild effectiveness. Denies fever, chills, or night sweats. Eating and drinking well. Bowel and bladder are working well. No antibiotic use in the past 30 days.     I have reviewed the Medications, Allergies, Past Medical and Surgical History, and Social History in the Epic system.      Review of Systems   Constitutional: Negative for activity change, appetite change, chills and fever.   HENT: Positive for dental problem and facial swelling. Negative for ear pain and trouble swallowing.         Left lower dental pain. Swelling to facial area to tooth #20 region.    Respiratory: Negative for cough.    Gastrointestinal: Negative for diarrhea, nausea and vomiting.   Skin: Negative for rash.   Psychiatric/Behavioral: Negative.        Physical Exam   BP: (!) 152/106  Heart Rate: 77  Temp: 97.3  F (36.3  C)  Resp: 16  SpO2: 97 %  Physical Exam   Constitutional: He is oriented to person, place, and time. He appears well-developed and well-nourished. No distress.   HENT:   Head: Normocephalic.   Right Ear: External ear normal.   Left Ear: External ear normal.   Mouth/Throat: Uvula is midline, oropharynx is clear and moist and mucous membranes are normal. No trismus in the jaw. Dental abscesses and dental caries present. No uvula swelling.       Tooth #20 with decay at base of outer tooth. TTP to tooth #20. Erythema along lateral gumline of tooth #20. Decay present throughout teeth. Has upper and lower partials.    Neck: Normal range of motion. Neck supple.   Cardiovascular: Normal rate, regular rhythm and normal heart sounds.    No murmur " heard.  Pulmonary/Chest: Effort normal. No respiratory distress.   Abdominal: Soft. He exhibits no distension.   Musculoskeletal: Normal range of motion.   Lymphadenopathy:     He has no cervical adenopathy.   Neurological: He is alert and oriented to person, place, and time.   Skin: Skin is warm and dry. No rash noted. He is not diaphoretic.   Psychiatric: He has a normal mood and affect. His behavior is normal.   Nursing note and vitals reviewed.      ED Course     ED Course     Left inferior alveolar nerve block  Performed by: ALEXANDREA SNELL  Authorized by: ALEXANDREA SNELL   Consent: Verbal consent obtained.  Risks and benefits: risks, benefits and alternatives were discussed  Consent given by: patient  Patient understanding: patient states understanding of the procedure being performed  Patient identity confirmed: verbally with patient  Local anesthesia used: yes    Anesthesia:  Local anesthesia used: yes  Local Anesthetic: bupivacaine 0.5% with epinephrine  Anesthetic total (ml): 1.8 ml.   Patient tolerance: Patient tolerated the procedure well with no immediate complications            Assessments & Plan (with Medical Decision Making)     Discussed plan of care. He verbalized understanding. All questions answered.     I have reviewed the nursing notes.    I have reviewed the findings, diagnosis, plan and need for follow up with the patient.  Discharged in stable condition.     New Prescriptions    AMOXICILLIN (AMOXIL) 500 MG CAPSULE    Take 1 capsule (500 mg) by mouth 3 times daily for 7 days       Final diagnoses:   Dental infection     Take antibiotics as ordered.   Eat a yogurt daily while taking antibiotics.   Take tylenol and or ibuprofen for pain. Follow dosing on package.   Good mouth hygiene. Brush teeth twice a day.  Follow up with dentist in 1 week.   Follow up with PCP with any increase in symptoms or concerns.   Return to urgent care or emergency department with any increase in symptoms or  concerns.    LUCILLE Ballesteros  8/3/2017  11:12 AM  URGENT CARE CLINIC       Alma Almeida NP  08/03/17 3799

## 2017-08-03 NOTE — ED AVS SNAPSHOT
[facility]    [address] [address]    Phone:  [phone]                                       Ramo Berry   MRN: [id number]    Department:  [facility]   Date of Visit:  8/3/2017           After Visit Summary Signature Page     I have received my discharge instructions, and my questions have been answered. I have discussed any challenges I see with this plan with the nurse or doctor.    ..........................................................................................................................................  Patient/Patient Representative Signature      ..........................................................................................................................................  Patient Representative Print Name and Relationship to Patient    ..................................................               ................................................  Date                                            Time    ..........................................................................................................................................  Reviewed by Signature/Title    ...................................................              ..............................................  Date                                                            Time

## 2017-08-11 ENCOUNTER — COMMUNICATION - GICH (OUTPATIENT)
Dept: INTERNAL MEDICINE | Facility: OTHER | Age: 57
End: 2017-08-11

## 2017-08-11 DIAGNOSIS — I10 ESSENTIAL (PRIMARY) HYPERTENSION: ICD-10-CM

## 2017-08-14 DIAGNOSIS — I10 BENIGN ESSENTIAL HYPERTENSION: Primary | ICD-10-CM

## 2017-08-14 DIAGNOSIS — G89.21 CHRONIC PAIN DUE TO TRAUMA: ICD-10-CM

## 2017-08-14 RX ORDER — LISINOPRIL 40 MG/1
40 TABLET ORAL DAILY
Qty: 30 TABLET | Refills: 5 | Status: SHIPPED | OUTPATIENT
Start: 2017-08-14 | End: 2018-02-22

## 2017-08-14 RX ORDER — HYDROCODONE BITARTRATE AND ACETAMINOPHEN 5; 325 MG/1; MG/1
1-2 TABLET ORAL EVERY 4 HOURS PRN
Qty: 20 TABLET | Refills: 0 | Status: SHIPPED | OUTPATIENT
Start: 2017-08-14 | End: 2017-08-23

## 2017-08-14 NOTE — TELEPHONE ENCOUNTER
norco      Last Written Prescription Date: 8/3/17  Last Fill Quantity: 20,  # refills: 0   Last Office Visit with [facility], [facility] or [facility] prescribing provider: 6/26/17                                         Next 5 appointments (look out 90 days)     Aug 15, 2017 10:00 AM CDT   (Arrive by 9:45 AM)   Office Visit with JUSTUS Santillan MD   [facility] ([facility] )    [address] [place] [address]   [phone]

## 2017-08-15 NOTE — TELEPHONE ENCOUNTER
Left message that the written RX is ready at the [facility]  registration to be picked up.

## 2017-08-23 DIAGNOSIS — G89.21 CHRONIC PAIN DUE TO TRAUMA: ICD-10-CM

## 2017-08-23 RX ORDER — HYDROCODONE BITARTRATE AND ACETAMINOPHEN 5; 325 MG/1; MG/1
1-2 TABLET ORAL EVERY 4 HOURS PRN
Qty: 20 TABLET | Refills: 0 | Status: SHIPPED | OUTPATIENT
Start: 2017-08-23 | End: 2017-09-06

## 2017-08-23 NOTE — TELEPHONE ENCOUNTER
Controlled Substance Refill Request for Norco  Problem List Complete:  No     PROVIDER TO CONSIDER COMPLETION OF PROBLEM LIST AND OVERVIEW/CONTROLLED SUBSTANCE AGREEMENT    Last Written Prescription Date:  08/15/17  Last Fill Quantity: 20,   # refills: 0    Last Office Visit with [facility] primary care provider: 08/01/17    Future Office visit:   Next 5 appointments (look out 90 days)     Aug 29, 2017  9:30 AM CDT   (Arrive by 9:15 AM)   Office Visit with JUSTUS Santillan MD   [facility] ([facility] )    [address] [place] [address]   [phone]                  Controlled substance agreement on file: No.     Processing:  Patient will  in clinic

## 2017-08-24 NOTE — TELEPHONE ENCOUNTER
Left message that the written RX is ready at the [facility]  registration to be picked up.

## 2017-08-29 ENCOUNTER — OFFICE VISIT (OUTPATIENT)
Dept: FAMILY MEDICINE | Facility: OTHER | Age: 57
End: 2017-08-29
Attending: FAMILY MEDICINE
Payer: MEDICARE

## 2017-08-29 VITALS
DIASTOLIC BLOOD PRESSURE: 82 MMHG | WEIGHT: 181.8 LBS | HEART RATE: 78 BPM | OXYGEN SATURATION: 94 % | SYSTOLIC BLOOD PRESSURE: 138 MMHG | BODY MASS INDEX: 26.09 KG/M2 | TEMPERATURE: 96.9 F

## 2017-08-29 DIAGNOSIS — G89.21 CHRONIC PAIN DUE TO TRAUMA: ICD-10-CM

## 2017-08-29 DIAGNOSIS — I10 ESSENTIAL HYPERTENSION, BENIGN: Primary | ICD-10-CM

## 2017-08-29 LAB
ANION GAP SERPL CALCULATED.3IONS-SCNC: 7 MMOL/L (ref 3–14)
BUN SERPL-MCNC: 20 MG/DL (ref 7–30)
CALCIUM SERPL-MCNC: 9.3 MG/DL (ref 8.5–10.1)
CHLORIDE SERPL-SCNC: 101 MMOL/L (ref 94–109)
CO2 SERPL-SCNC: 26 MMOL/L (ref 20–32)
CREAT SERPL-MCNC: 1.31 MG/DL (ref 0.66–1.25)
GFR SERPL CREATININE-BSD FRML MDRD: 56 ML/MIN/1.7M2
GLUCOSE SERPL-MCNC: 92 MG/DL (ref 70–99)
POTASSIUM SERPL-SCNC: 4.5 MMOL/L (ref 3.4–5.3)
SODIUM SERPL-SCNC: 134 MMOL/L (ref 133–144)

## 2017-08-29 PROCEDURE — 99212 OFFICE O/P EST SF 10 MIN: CPT

## 2017-08-29 PROCEDURE — 99213 OFFICE O/P EST LOW 20 MIN: CPT | Performed by: FAMILY MEDICINE

## 2017-08-29 PROCEDURE — 36415 COLL VENOUS BLD VENIPUNCTURE: CPT | Mod: ZL | Performed by: FAMILY MEDICINE

## 2017-08-29 PROCEDURE — 80048 BASIC METABOLIC PNL TOTAL CA: CPT | Mod: ZL | Performed by: FAMILY MEDICINE

## 2017-08-29 ASSESSMENT — ANXIETY QUESTIONNAIRES
GAD7 TOTAL SCORE: 7
6. BECOMING EASILY ANNOYED OR IRRITABLE: NOT AT ALL
2. NOT BEING ABLE TO STOP OR CONTROL WORRYING: SEVERAL DAYS
3. WORRYING TOO MUCH ABOUT DIFFERENT THINGS: MORE THAN HALF THE DAYS
1. FEELING NERVOUS, ANXIOUS, OR ON EDGE: SEVERAL DAYS
4. TROUBLE RELAXING: SEVERAL DAYS
5. BEING SO RESTLESS THAT IT IS HARD TO SIT STILL: SEVERAL DAYS
7. FEELING AFRAID AS IF SOMETHING AWFUL MIGHT HAPPEN: SEVERAL DAYS

## 2017-08-29 ASSESSMENT — PATIENT HEALTH QUESTIONNAIRE - PHQ9: SUM OF ALL RESPONSES TO PHQ QUESTIONS 1-9: 6

## 2017-08-29 ASSESSMENT — PAIN SCALES - GENERAL: PAINLEVEL: MODERATE PAIN (4)

## 2017-08-29 NOTE — MR AVS SNAPSHOT
After Visit Summary   8/29/2017    Ramo Berry    MRN: [id number]           Patient Information     Date Of Birth          1960        Visit Information        Provider Department      8/29/2017 9:30 AM [facility]        Today's Diagnoses     Essential hypertension, benign    -  1      Care Instructions    We will call with the labs.            Follow-ups after your visit        Follow-up notes from your care team     Return in about 3 months (around 11/29/2017).      Your next 10 appointments already scheduled     Aug 29, 2017  9:30 AM CDT   (Arrive by 9:15 AM)   Office Visit with JUSTUS Santillan MD   [facility] ([facility]] [place] ([facility] )    [address]  [facility] [address]   [phone]           Bring a current list of meds and any records pertaining to this visit.  For Physicals, please bring immunization records and any forms needing to be filled out.  Please arrive 15 minutes early to complete paperwork and register.              Who to contact     If you have questions or need follow up information about today's clinic visit or your schedule please contact [facility] directly at [phone].  Normal or non-critical lab and imaging results will be communicated to you by MyChart, letter or phone within 4 business days after the clinic has received the results. If you do not hear from us within 7 days, please contact the clinic through MyChart or phone. If you have a critical or abnormal lab result, we will notify you by phone as soon as possible.  Submit refill requests through Partender or call your pharmacy and they will forward the refill request to us. Please allow 3 business days for your refill to be completed.          Additional Information About Your Visit        MyChart Information     Partender lets you send messages to your doctor, view your test results, renew your prescriptions, schedule appointments and  "more. To sign up, go to [email or web address] . Click on \"Log in\" on the left side of the screen, which will take you to the Welcome page. Then click on \"Sign up Now\" on the right side of the page.     You will be asked to enter the access code listed below, as well as some personal information. Please follow the directions to create your username and password.     Your access code is: [email or web address]  Expires: 2017  9:50 AM     Your access code will  in 90 days. If you need help or a new code, please call your [facility] clinic or [phone].        Care EveryWhere ID     This is your Care EveryWhere ID. This could be used by other organizations to access your [facility] medical records  [id number]        Your Vitals Were     Pulse Temperature Pulse Oximetry BMI (Body Mass Index)          78 96.9  F (36.1  C) (Tympanic) 94% 26.09 kg/m2         Blood Pressure from Last 3 Encounters:   17 138/82   17 (!) 152/106   17 (!) 162/96    Weight from Last 3 Encounters:   17 181 lb 12.8 oz (82.5 kg)   17 188 lb (85.3 kg)   17 180 lb (81.6 kg)              We Performed the Following     Basic metabolic panel        Primary Care Provider Office Phone # Fax #    R Nayan Santillan [phone]       [place] [phone]        Equal Access to Services     [facility]: Hadii aad gerardo hadasho Soomaali, waaxda luqadaha, qaybta kaalmada adeegcarlene, arin pate . So [facility] [phone].    ATENCIÓN: Si habla español, tiene a blackwell disposición servicios gratuitos de asistencia lingüística. Llame al [phone].    We comply with applicable federal civil rights laws and [place] laws. We do not discriminate on the basis of race, color, national origin, age, disability sex, sexual orientation or gender identity.            Thank you!     Thank you for choosing [facility]  for your care. Our goal is " always to provide you with excellent care. Hearing back from our patients is one way we can continue to improve our services. Please take a few minutes to complete the written survey that you may receive in the mail after your visit with us. Thank you!             Your Updated Medication List - Protect others around you: Learn how to safely use, store and throw away your medicines at www.disposemymeds.org.          This list is accurate as of: 8/29/17  9:19 AM.  Always use your most recent med list.                   Brand Name Dispense Instructions for use Diagnosis    amLODIPine 10 MG tablet    NORVASC    90 tablet    Take 1 tablet (10 mg) by mouth daily    Benign essential hypertension       aspirin 325 MG tablet      Take 1 tablet by mouth every 4 hours as needed. For pain        hydrochlorothiazide 12.5 MG Tabs tablet     30 tablet    Take 1 tablet (12.5 mg) by mouth daily    Essential hypertension, benign       HYDROcodone-acetaminophen 5-325 MG per tablet    NORCO    20 tablet    Take 1-2 tablets by mouth every 4 hours as needed for moderate to severe pain    Chronic pain due to trauma       lisinopril 40 MG tablet    PRINIVIL/ZESTRIL    30 tablet    Take 1 tablet (40 mg) by mouth daily    Benign essential hypertension       metoprolol 25 MG tablet    LOPRESSOR    120 tablet    Take 2 tablets (50 mg) by mouth 2 times daily    Benign essential hypertension       omeprazole 20 MG CR capsule    priLOSEC    120 capsule    Take 2 capsules (40 mg) by mouth daily    Gastroesophageal reflux disease without esophagitis

## 2017-08-29 NOTE — NURSING NOTE
"Chief Complaint   Patient presents with     Hypertension       Initial /82 (BP Location: Left arm, Patient Position: Chair, Cuff Size: Adult Large)  Pulse 78  Temp 96.9  F (36.1  C) (Tympanic)  Wt 181 lb 12.8 oz (82.5 kg)  SpO2 94%  BMI 26.09 kg/m2 Estimated body mass index is 26.09 kg/(m^2) as calculated from the following:    Height as of 8/1/17: 5' 10\" (1.778 m).    Weight as of this encounter: 181 lb 12.8 oz (82.5 kg).  Medication Reconciliation: complete   Serena Guzman CMA(AAMA)   "
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
## 2017-08-30 ASSESSMENT — ANXIETY QUESTIONNAIRES: GAD7 TOTAL SCORE: 7

## 2017-09-06 DIAGNOSIS — G89.21 CHRONIC PAIN DUE TO TRAUMA: ICD-10-CM

## 2017-09-06 NOTE — TELEPHONE ENCOUNTER
norco      Last Written Prescription Date: 8/23/17  Last Fill Quantity: 20,  # refills: 0   Last Office Visit with G, [facility] or [facility] prescribing provider: 8/29/17

## 2017-09-07 RX ORDER — HYDROCODONE BITARTRATE AND ACETAMINOPHEN 5; 325 MG/1; MG/1
1-2 TABLET ORAL EVERY 4 HOURS PRN
Qty: 20 TABLET | Refills: 0 | Status: SHIPPED | OUTPATIENT
Start: 2017-09-07 | End: 2017-11-13

## 2017-09-07 NOTE — TELEPHONE ENCOUNTER
Left message that the written RX is ready at the [facility]  registration to be picked up.

## 2017-09-18 DIAGNOSIS — G89.21 CHRONIC PAIN DUE TO TRAUMA: ICD-10-CM

## 2017-09-18 RX ORDER — HYDROCODONE BITARTRATE AND ACETAMINOPHEN 5; 325 MG/1; MG/1
1-2 TABLET ORAL EVERY 4 HOURS PRN
Qty: 20 TABLET | Refills: 0 | OUTPATIENT
Start: 2017-09-18

## 2017-09-18 NOTE — TELEPHONE ENCOUNTER
Last note states that pt was to contact his spine surgeon's office in regards to his stimulator.  No new notes in media.  Please advise.  Thank you.    Alexandra Caldwell RN-BSN  Chronic Pain Care Coordinator  [phone] opt. #3

## 2017-09-18 NOTE — TELEPHONE ENCOUNTER
Controlled Substance Refill Request for Norco  Problem List Complete:  No     PROVIDER TO CONSIDER COMPLETION OF PROBLEM LIST AND OVERVIEW/CONTROLLED SUBSTANCE AGREEMENT    Last Written Prescription Date:  9.7.17  Last Fill Quantity: 20,   # refills: 0    Last Office Visit with [facility] primary care provider: 8.29.17    Future Office visit:     Controlled substance agreement on file: No.     Processing:  Patient will  in clinic     checked in past 6 months?  No, route to RN

## 2017-09-20 NOTE — TELEPHONE ENCOUNTER
Please inform pt that Norco will not be filled.  Per Dr. Nayan Santillan, needs to get his stimulator evaluated.  Thank you.

## 2017-09-21 DIAGNOSIS — G89.21 CHRONIC PAIN DUE TO TRAUMA: ICD-10-CM

## 2017-09-21 RX ORDER — HYDROCODONE BITARTRATE AND ACETAMINOPHEN 5; 325 MG/1; MG/1
1-2 TABLET ORAL EVERY 4 HOURS PRN
Qty: 20 TABLET | Refills: 0 | OUTPATIENT
Start: 2017-09-21

## 2017-09-21 NOTE — TELEPHONE ENCOUNTER
Controlled Substance Refill Request for Norco  Problem List Complete:  No     PROVIDER TO CONSIDER COMPLETION OF PROBLEM LIST AND OVERVIEW/CONTROLLED SUBSTANCE AGREEMENT    Last Written Prescription Date:9.7.17  Last Fill Quantity: 20,   # refills: .    Last Office Visit with [facility] primary care provider: 8.29.17    Future Office visit: none    Controlled substance agreement on file: No.     Processing:  Patient will  in clinic     checked in past 6 months?  No, route to RN

## 2017-09-26 ENCOUNTER — TELEPHONE (OUTPATIENT)
Dept: FAMILY MEDICINE | Facility: OTHER | Age: 57
End: 2017-09-26

## 2017-09-26 NOTE — TELEPHONE ENCOUNTER
Patient called regarding medication refill request. Informed patient that he needed to make a med review appointment. Patient stated that he could not afford that right now.

## 2017-10-23 ENCOUNTER — HOSPITAL ENCOUNTER (EMERGENCY)
Facility: HOSPITAL | Age: 57
Discharge: HOME | End: 2017-10-23
Attending: FAMILY MEDICINE | Admitting: FAMILY MEDICINE
Payer: MEDICARE

## 2017-10-23 VITALS
TEMPERATURE: 97 F | RESPIRATION RATE: 16 BRPM | DIASTOLIC BLOOD PRESSURE: 111 MMHG | HEART RATE: 102 BPM | SYSTOLIC BLOOD PRESSURE: 179 MMHG | OXYGEN SATURATION: 98 %

## 2017-10-23 DIAGNOSIS — K08.89 DENTALGIA: ICD-10-CM

## 2017-10-23 LAB
ALBUMIN SERPL-MCNC: 4.3 G/DL (ref 3.4–5)
ALP SERPL-CCNC: 54 U/L (ref 40–150)
ALT SERPL W P-5'-P-CCNC: 14 U/L (ref 0–70)
ANION GAP SERPL CALCULATED.3IONS-SCNC: 9 MMOL/L (ref 3–14)
AST SERPL W P-5'-P-CCNC: 8 U/L (ref 0–45)
BASOPHILS # BLD AUTO: 0.1 10E9/L (ref 0–0.2)
BASOPHILS NFR BLD AUTO: 0.7 %
BILIRUB SERPL-MCNC: 0.2 MG/DL (ref 0.2–1.3)
BUN SERPL-MCNC: 23 MG/DL (ref 7–30)
CALCIUM SERPL-MCNC: 9.1 MG/DL (ref 8.5–10.1)
CHLORIDE SERPL-SCNC: 112 MMOL/L (ref 94–109)
CO2 SERPL-SCNC: 23 MMOL/L (ref 20–32)
CREAT SERPL-MCNC: 1.25 MG/DL (ref 0.66–1.25)
CRP SERPL-MCNC: 7.8 MG/L (ref 0–8)
DIFFERENTIAL METHOD BLD: ABNORMAL
EOSINOPHIL # BLD AUTO: 0.8 10E9/L (ref 0–0.7)
EOSINOPHIL NFR BLD AUTO: 6.4 %
ERYTHROCYTE [DISTWIDTH] IN BLOOD BY AUTOMATED COUNT: 15.2 % (ref 10–15)
ERYTHROCYTE [SEDIMENTATION RATE] IN BLOOD BY WESTERGREN METHOD: 9 MM/H (ref 0–20)
GFR SERPL CREATININE-BSD FRML MDRD: 60 ML/MIN/1.7M2
GLUCOSE SERPL-MCNC: 111 MG/DL (ref 70–99)
HCT VFR BLD AUTO: 47.1 % (ref 40–53)
HGB BLD-MCNC: 16 G/DL (ref 13.3–17.7)
IMM GRANULOCYTES # BLD: 0 10E9/L (ref 0–0.4)
IMM GRANULOCYTES NFR BLD: 0.2 %
LYMPHOCYTES # BLD AUTO: 1.5 10E9/L (ref 0.8–5.3)
LYMPHOCYTES NFR BLD AUTO: 12 %
MCH RBC QN AUTO: 30 PG (ref 26.5–33)
MCHC RBC AUTO-ENTMCNC: 34 G/DL (ref 31.5–36.5)
MCV RBC AUTO: 88 FL (ref 78–100)
MONOCYTES # BLD AUTO: 1.2 10E9/L (ref 0–1.3)
MONOCYTES NFR BLD AUTO: 9.7 %
NEUTROPHILS # BLD AUTO: 8.7 10E9/L (ref 1.6–8.3)
NEUTROPHILS NFR BLD AUTO: 71 %
NRBC # BLD AUTO: 0 10*3/UL
NRBC BLD AUTO-RTO: 0 /100
PLATELET # BLD AUTO: 342 10E9/L (ref 150–450)
POTASSIUM SERPL-SCNC: 3.8 MMOL/L (ref 3.4–5.3)
PROT SERPL-MCNC: 8.3 G/DL (ref 6.8–8.8)
RBC # BLD AUTO: 5.34 10E12/L (ref 4.4–5.9)
SODIUM SERPL-SCNC: 144 MMOL/L (ref 133–144)
WBC # BLD AUTO: 12.3 10E9/L (ref 4–11)

## 2017-10-23 PROCEDURE — 85652 RBC SED RATE AUTOMATED: CPT | Performed by: FAMILY MEDICINE

## 2017-10-23 PROCEDURE — 86140 C-REACTIVE PROTEIN: CPT | Performed by: FAMILY MEDICINE

## 2017-10-23 PROCEDURE — 99284 EMERGENCY DEPT VISIT MOD MDM: CPT | Mod: 25

## 2017-10-23 PROCEDURE — 96372 THER/PROPH/DIAG INJ SC/IM: CPT

## 2017-10-23 PROCEDURE — 99284 EMERGENCY DEPT VISIT MOD MDM: CPT | Performed by: FAMILY MEDICINE

## 2017-10-23 PROCEDURE — 80053 COMPREHEN METABOLIC PANEL: CPT | Performed by: FAMILY MEDICINE

## 2017-10-23 PROCEDURE — 85025 COMPLETE CBC W/AUTO DIFF WBC: CPT | Performed by: FAMILY MEDICINE

## 2017-10-23 PROCEDURE — 36415 COLL VENOUS BLD VENIPUNCTURE: CPT | Performed by: FAMILY MEDICINE

## 2017-10-23 PROCEDURE — 25000128 H RX IP 250 OP 636: Performed by: FAMILY MEDICINE

## 2017-10-23 PROCEDURE — 25000125 ZZHC RX 250: Performed by: FAMILY MEDICINE

## 2017-10-23 RX ORDER — CEFTRIAXONE SODIUM 1 G
1 VIAL (EA) INJECTION ONCE
Status: COMPLETED | OUTPATIENT
Start: 2017-10-23 | End: 2017-10-23

## 2017-10-23 RX ORDER — AMOXICILLIN 500 MG/1
1000 CAPSULE ORAL 2 TIMES DAILY
Qty: 40 CAPSULE | Refills: 0 | Status: SHIPPED | OUTPATIENT
Start: 2017-10-23 | End: 2017-11-02

## 2017-10-23 RX ORDER — AMOXICILLIN 500 MG/1
1000 CAPSULE ORAL 2 TIMES DAILY
Qty: 20 CAPSULE | Refills: 0 | Status: SHIPPED | OUTPATIENT
Start: 2017-10-23 | End: 2017-11-13

## 2017-10-23 RX ORDER — HYDROCODONE BITARTRATE AND ACETAMINOPHEN 5; 325 MG/1; MG/1
1-2 TABLET ORAL EVERY 4 HOURS PRN
Qty: 30 TABLET | Refills: 0 | Status: SHIPPED | OUTPATIENT
Start: 2017-10-23 | End: 2017-11-13

## 2017-10-23 RX ADMIN — LIDOCAINE HYDROCHLORIDE 1 G: 10 INJECTION, SOLUTION EPIDURAL; INFILTRATION; INTRACAUDAL; PERINEURAL at 04:58

## 2017-10-23 NOTE — DISCHARGE INSTRUCTIONS
Thank you for coming to [facility].  If you are concerned or things get worse, don't hesitate to return to the Emergency Room.    Be sure to keep brushing your teeth even if it does hurt so you can cut down on the bacteria.  Also rinse and spit with Listerine for 30 seconds twice daily.    Call the [facility].  You must see them so they can fix your tooth that is hurting.    The [facility] is a dental clinic.  IT is open 365 days per year from 7 am to 8 pm weekdays and 8am to 5pm weekends and Holidays.  WWW. [facility]     [place]: [address] [place] [address].  Phone: [phone].  Toll Free [phone].     [facility]: [address] [place] [address]. Phone [phone].     [place]: [address] [place] [address]. Phone [phone] Toll Free [phone]    Go on line and look up www.mndental.org it has numerous resources for low cost dental care.    Use tylenol 1000 mg up to 4 times per day or T3/Vicodin/Percocet 1-2 up to four times daily.  Don't use tylenol in addition to these pain medications.     Use ibuprofen 800 mg three times daily with food.    You can use both at the same time and repeat in 6 hours or you can alternate every 3 hours or you can do one or the other.
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
## 2017-10-23 NOTE — ED NOTES
"Pt to ED room 5 with c/o left lower facial swelling x2 days. Pt also c/o pain to lower teeth. Pt has a partial dental plate on the bottom and states \"it is barely staying in right now.\" Pt denies difficulty breathing and is managing secretions. Pt also denies n/v.   "

## 2017-10-23 NOTE — ED AVS SNAPSHOT
[facility]    [address] [address]    Phone:  [phone]                                       Ramo Berry   MRN: [id number]    Department:  [facility]   Date of Visit:  10/23/2017           Patient Information     Date Of Birth          1960        Your diagnoses for this visit were:     Dentalgia        You were seen by Leigh Ann Matthews MD.        Discharge Instructions       Thank you for coming to [facility].  If you are concerned or things get worse, don't hesitate to return to the Emergency Room.    Be sure to keep brushing your teeth even if it does hurt so you can cut down on the bacteria.  Also rinse and spit with Listerine for 30 seconds twice daily.    Call the [facility].  You must see them so they can fix your tooth that is hurting.    The [facility] is a dental clinic.  IT is open 365 days per year from 7 am to 8 pm weekdays and 8am to 5pm weekends and Holidays.  [facility]. [facility]     [place]: [address] [place] [address].  Phone: [phone].  Toll Free [phone].     [facility]: [id number] [place] [address]. Phone [phone].     [place]: [address] [place] [address]. Phone [phone] Toll Free [phone]    Go on line and look up [email or web address].org it has numerous resources for low cost dental care.    Use tylenol 1000 mg up to 4 times per day or T3/Vicodin/Percocet 1-2 up to four times daily.  Don't use tylenol in addition to these pain medications.     Use ibuprofen 800 mg three times daily with food.    You can use both at the same time and repeat in 6 hours or you can alternate every 3 hours or you can do one or the other.         Review of your medicines      START taking        Dose / Directions Last dose taken    * amoxicillin 500 MG capsule   Commonly known as:  AMOXIL   Dose:  1000 mg   Quantity:  40 capsule        Take 2 capsules (1,000 mg) by mouth 2 times  daily for 10 days   Refills:  0        * amoxicillin 500 MG capsule   Commonly known as:  AMOXIL   Dose:  1000 mg   Quantity:  20 capsule        Take 2 capsules (1,000 mg) by mouth 2 times daily   Refills:  0        * Notice:  This list has 2 medication(s) that are the same as other medications prescribed for you. Read the directions carefully, and ask your doctor or other care provider to review them with you.      CONTINUE these medicines which may have CHANGED, or have new prescriptions. If we are uncertain of the size of tablets/capsules you have at home, strength may be listed as something that might have changed.        Dose / Directions Last dose taken    * HYDROcodone-acetaminophen 5-325 MG per tablet   Commonly known as:  NORCO   Dose:  1-2 tablet   What changed:  Another medication with the same name was added. Make sure you understand how and when to take each.   Quantity:  20 tablet        Take 1-2 tablets by mouth every 4 hours as needed for moderate to severe pain   Refills:  0        * HYDROcodone-acetaminophen 5-325 MG   Commonly known as:  NORCO   Dose:  1 tablet   What changed:  You were already taking a medication with the same name, and this prescription was added. Make sure you understand how and when to take each.   Quantity:  6 tablet        Take 1 tablet by mouth every 4 hours as needed for moderate to severe pain   Refills:  0        * HYDROcodone-acetaminophen 5-325 MG per tablet   Commonly known as:  NORCO   Dose:  1-2 tablet   What changed:  You were already taking a medication with the same name, and this prescription was added. Make sure you understand how and when to take each.   Quantity:  30 tablet        Take 1-2 tablets by mouth every 4 hours as needed for moderate to severe pain   Refills:  0        * Notice:  This list has 3 medication(s) that are the same as other medications prescribed for you. Read the directions carefully, and ask your doctor or other care provider to review  them with you.      Our records show that you are taking the medicines listed below. If these are incorrect, please call your family doctor or clinic.        Dose / Directions Last dose taken    amLODIPine 10 MG tablet   Commonly known as:  NORVASC   Dose:  10 mg   Quantity:  90 tablet        Take 1 tablet (10 mg) by mouth daily   Refills:  0        aspirin 325 MG tablet   Dose:  1 tablet        Take 1 tablet by mouth every 4 hours as needed. For pain   Refills:  0        hydrochlorothiazide 12.5 MG Tabs tablet   Dose:  12.5 mg   Quantity:  30 tablet        Take 1 tablet (12.5 mg) by mouth daily   Refills:  3        lisinopril 40 MG tablet   Commonly known as:  PRINIVIL/ZESTRIL   Dose:  40 mg   Quantity:  30 tablet        Take 1 tablet (40 mg) by mouth daily   Refills:  5        metoprolol 25 MG tablet   Commonly known as:  LOPRESSOR   Dose:  50 mg   Quantity:  120 tablet        Take 2 tablets (50 mg) by mouth 2 times daily   Refills:  5        omeprazole 20 MG CR capsule   Commonly known as:  priLOSEC   Dose:  40 mg   Quantity:  120 capsule        Take 2 capsules (40 mg) by mouth daily   Refills:  5                Prescriptions were sent or printed at these locations (4 Prescriptions)                   [id number] [place] [place] [facility] [address] [facility] [address]   [address] [address] [address], [facility] [address]    Telephone:  [phone]   Fax:  [phone]   Hours:                  E-Prescribed (1 of 4)         amoxicillin (AMOXIL) 500 MG capsule                 Printed at Department/Unit printer (3 of 4)         amoxicillin (AMOXIL) 500 MG capsule               HYDROcodone-acetaminophen (NORCO) 5-325 MG               HYDROcodone-acetaminophen (NORCO) 5-325 MG per tablet                Orders Needing Specimen Collection     None      Pending Results     No orders found from 10/21/2017 to 10/24/2017.            Pending Culture Results     No orders found from 10/21/2017 to 10/24/2017.            Thank you for choosing [facility]      "  Thank you for choosing [facility] for your care. Our goal is always to provide you with excellent care. Hearing back from our patients is one way we can continue to improve our services. Please take a few minutes to complete the written survey that you may receive in the mail after you visit with us. Thank you!        [employer] Information     [employer] lets you send messages to your doctor, view your test results, renew your prescriptions, schedule appointments and more. To sign up, go to [email or web address] . Click on \"Log in\" on the left side of the screen, which will take you to the Welcome page. Then click on \"Sign up Now\" on the right side of the page.     You will be asked to enter the access code listed below, as well as some personal information. Please follow the directions to create your username and password.     Your access code is: [email or web address]  Expires: 2018  4:54 AM     Your access code will  in 90 days. If you need help or a new code, please call your [facility] clinic or [phone].        Care EveryWhere ID     This is your Care EveryWhere ID. This could be used by other organizations to access your [facility] medical records  [id number]        Equal Access to Services     MADALYN SONI : Cindy Bustamante, wamargy diaz, qaybta kaalmada brian, arin dueñas. So [facility] [phone].    ATENCIÓN: Si habla español, tiene a blackwell disposición servicios gratuitos de asistencia lingüística. Llame al [phone].    We comply with applicable federal civil rights laws and [place] laws. We do not discriminate on the basis of race, color, national origin, age, disability, sex, sexual orientation, or gender identity.            After Visit Summary       This is your record. Keep this with you and show to your community pharmacist(s) and doctor(s) at your next visit.                  "

## 2017-10-23 NOTE — ED NOTES
Discharge instructions reviewed with patient, Rx given to patient, TH given to patient, and patient verbalized understanding. Pt ambulated with a steady gait to the exit.

## 2017-10-23 NOTE — ED AVS SNAPSHOT
[facility]    [address] [address]    Phone:  [phone]                                       Ramo Berry   MRN: [id number]    Department:  [facility]   Date of Visit:  10/23/2017           After Visit Summary Signature Page     I have received my discharge instructions, and my questions have been answered. I have discussed any challenges I see with this plan with the nurse or doctor.    ..........................................................................................................................................  Patient/Patient Representative Signature      ..........................................................................................................................................  Patient Representative Print Name and Relationship to Patient    ..................................................               ................................................  Date                                            Time    ..........................................................................................................................................  Reviewed by Signature/Title    ...................................................              ..............................................  Date                                                            Time

## 2017-10-24 ASSESSMENT — ENCOUNTER SYMPTOMS
DYSURIA: 0
VOMITING: 0
NAUSEA: 0
SORE THROAT: 0
DIARRHEA: 0
SHORTNESS OF BREATH: 0
COUGH: 0
WHEEZING: 0
DIZZINESS: 0
ABDOMINAL PAIN: 0
FEVER: 0
CHILLS: 0

## 2017-11-02 DIAGNOSIS — I10 BENIGN ESSENTIAL HYPERTENSION: ICD-10-CM

## 2017-11-02 NOTE — TELEPHONE ENCOUNTER
Amlodipine       Last Written Prescription Date: 7/20/17  Last Fill Quantity: 90,  # refills: 0   Last Office Visit with G, P or [facility] prescribing provider: 8/29/17

## 2017-11-07 RX ORDER — AMLODIPINE BESYLATE 10 MG/1
10 TABLET ORAL DAILY
Qty: 90 TABLET | Refills: 1 | Status: SHIPPED | OUTPATIENT
Start: 2017-11-07 | End: 2018-06-12

## 2017-11-10 ENCOUNTER — TELEPHONE (OUTPATIENT)
Dept: FAMILY MEDICINE | Facility: OTHER | Age: 57
End: 2017-11-10

## 2017-11-10 NOTE — TELEPHONE ENCOUNTER
Offered patient appointment at 3pm today, declined appointment requesting pain medication. Notified patient he needs to be seen to discuss that. Also notified patient he can be seen at Urgent Care  CATRACHITO WHARTON

## 2017-11-10 NOTE — TELEPHONE ENCOUNTER
9:28 AM    Reason for Call: OVERBOOK    Patient is having the following symptoms: Patient fell on ice on 11/08/2017  for Dr VIANNEY Santillan2 days.    The patient is requesting an appointment for 11/10/2017 with Dr VIANNEY Santillan.    Was an appointment offered for this call? Yes, patient is scheduled on Monday 11/13 but would like to come in today or get a medication to help over the weekend.  Per patient.    If yes : Appointment type              Date    Preferred method for responding to this message: Telephone Call/[phone]  What is your phone number ?[phone]    If we cannot reach you directly, may we leave a detailed response at the number you provided? Yes    Can this message wait until your PCP/provider returns, if unavailable today? Ekaterina,     Mercy Mcgee

## 2017-11-13 ENCOUNTER — OFFICE VISIT (OUTPATIENT)
Dept: FAMILY MEDICINE | Facility: OTHER | Age: 57
End: 2017-11-13
Attending: FAMILY MEDICINE
Payer: MEDICARE

## 2017-11-13 VITALS
HEART RATE: 66 BPM | DIASTOLIC BLOOD PRESSURE: 84 MMHG | OXYGEN SATURATION: 96 % | SYSTOLIC BLOOD PRESSURE: 130 MMHG | WEIGHT: 181 LBS | RESPIRATION RATE: 18 BRPM | BODY MASS INDEX: 25.97 KG/M2 | TEMPERATURE: 97.2 F

## 2017-11-13 DIAGNOSIS — Z72.0 TOBACCO ABUSE: ICD-10-CM

## 2017-11-13 DIAGNOSIS — I10 BENIGN ESSENTIAL HYPERTENSION: Primary | ICD-10-CM

## 2017-11-13 PROCEDURE — 99213 OFFICE O/P EST LOW 20 MIN: CPT | Performed by: FAMILY MEDICINE

## 2017-11-13 PROCEDURE — 99212 OFFICE O/P EST SF 10 MIN: CPT

## 2017-11-13 ASSESSMENT — ANXIETY QUESTIONNAIRES
4. TROUBLE RELAXING: SEVERAL DAYS
2. NOT BEING ABLE TO STOP OR CONTROL WORRYING: SEVERAL DAYS
7. FEELING AFRAID AS IF SOMETHING AWFUL MIGHT HAPPEN: NOT AT ALL
IF YOU CHECKED OFF ANY PROBLEMS ON THIS QUESTIONNAIRE, HOW DIFFICULT HAVE THESE PROBLEMS MADE IT FOR YOU TO DO YOUR WORK, TAKE CARE OF THINGS AT HOME, OR GET ALONG WITH OTHER PEOPLE: NOT DIFFICULT AT ALL
3. WORRYING TOO MUCH ABOUT DIFFERENT THINGS: SEVERAL DAYS
GAD7 TOTAL SCORE: 4
6. BECOMING EASILY ANNOYED OR IRRITABLE: NOT AT ALL
5. BEING SO RESTLESS THAT IT IS HARD TO SIT STILL: NOT AT ALL
1. FEELING NERVOUS, ANXIOUS, OR ON EDGE: SEVERAL DAYS

## 2017-11-13 ASSESSMENT — PATIENT HEALTH QUESTIONNAIRE - PHQ9: SUM OF ALL RESPONSES TO PHQ QUESTIONS 1-9: 4

## 2017-11-13 ASSESSMENT — PAIN SCALES - GENERAL: PAINLEVEL: MODERATE PAIN (4)
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
## 2017-11-13 NOTE — MR AVS SNAPSHOT
"              After Visit Summary   11/13/2017    Ramo Berry    MRN: [id number]           Patient Information     Date Of Birth          1960        Visit Information        Provider Department      11/13/2017 1:45 PM JUSTUS Santillan MD [facility]        Care Instructions    Return in 6 months          Follow-ups after your visit        Follow-up notes from your care team     Return in about 6 months (around 5/13/2018).      Your next 10 appointments already scheduled     Nov 13, 2017  1:45 PM CST   (Arrive by 1:30 PM)   Office Visit with JUSTUS Santillan MD   [facility] [place] ([facility] )    [address]  [place] [address]   [phone]           Bring a current list of meds and any records pertaining to this visit.  For Physicals, please bring immunization records and any forms needing to be filled out.  Please arrive 15 minutes early to complete paperwork and register.              Who to contact     If you have questions or need follow up information about today's clinic visit or your schedule please contact [facility] directly at [phone].  Normal or non-critical lab and imaging results will be communicated to you by Giveterhart, letter or phone within 4 business days after the clinic has received the results. If you do not hear from us within 7 days, please contact the clinic through Giveterhart or phone. If you have a critical or abnormal lab result, we will notify you by phone as soon as possible.  Submit refill requests through DxNA or call your pharmacy and they will forward the refill request to us. Please allow 3 business days for your refill to be completed.          Additional Information About Your Visit        MyChart Information     DxNA lets you send messages to your doctor, view your test results, renew your prescriptions, schedule appointments and more. To sign up, go to [email or web address] . Click on \"Log in\" on the " "left side of the screen, which will take you to the Welcome page. Then click on \"Sign up Now\" on the right side of the page.     You will be asked to enter the access code listed below, as well as some personal information. Please follow the directions to create your username and password.     Your access code is: [email or web address]  Expires: 2018  3:54 AM     Your access code will  in 90 days. If you need help or a new code, please call your [facility] or [phone].        Care EveryWhere ID     This is your Care EveryWhere ID. This could be used by other organizations to access your [place] medical records  [id number]        Your Vitals Were     Pulse Temperature Respirations Pulse Oximetry BMI (Body Mass Index)       66 97.2  F (36.2  C) (Tympanic) 18 96% 25.97 kg/m2        Blood Pressure from Last 3 Encounters:   17 130/84   10/23/17 (!) 179/111   17 138/82    Weight from Last 3 Encounters:   17 181 lb (82.1 kg)   17 181 lb 12.8 oz (82.5 kg)   17 188 lb (85.3 kg)              Today, you had the following     No orders found for display         Today's Medication Changes          These changes are accurate as of: 17  1:38 PM.  If you have any questions, ask your nurse or doctor.               Stop taking these medicines if you haven't already. Please contact your care team if you have questions.     amoxicillin 500 MG capsule   Commonly known as:  AMOXIL   Stopped by:  JUSTUS Santillan MD           HYDROcodone-acetaminophen 5-325 MG   Commonly known as:  NORCO   Stopped by:  JUSTUS Santillan MD           HYDROcodone-acetaminophen 5-325 MG per tablet   Commonly known as:  NORCO   Stopped by:  JUSTUS Santillan MD                    Primary Care Provider Office Phone # Fax #    JUSTUS Santillan [phone]       [facility] [place] [address] [place] [id number]        Equal Access to Services     CAITLYN SONI AH: Cindy jules " Dianna, wajumada luqadaha, qaybta kaalmada brian, arin galvan guypatricia mosqueraventura spenser. So [facility] [phone].    ATENCIÓN: Si clementina mata, tiene a blackwell disposición servicios gratuitos de asistencia lingüística. Lizz al [phone].    We comply with applicable federal civil rights laws and [place] laws. We do not discriminate on the basis of race, color, national origin, age, disability, sex, sexual orientation, or gender identity.            Thank you!     Thank you for choosing [facility]  [facility] your [facility]. Our goal is always to provide you with excellent care. Hearing back from our patients is one way we can continue to improve our services. Please take a few minutes to complete the written survey that you may receive in the mail after your visit with us. Thank you!             Your Updated Medication List - Protect others around you: Learn how to safely use, store and throw away your medicines at www.disposemymeds.org.          This list is accurate as of: 11/13/17  1:38 PM.  Always use your most recent med list.                   Brand Name Dispense Instructions for use Diagnosis    amLODIPine 10 MG tablet    NORVASC    90 tablet    Take 1 tablet (10 mg) by mouth daily    Benign essential hypertension       aspirin 325 MG tablet      Take 1 tablet by mouth every 4 hours as needed. For pain        hydrochlorothiazide 12.5 MG Tabs tablet     30 tablet    Take 1 tablet (12.5 mg) by mouth daily    Essential hypertension, benign       lisinopril 40 MG tablet    PRINIVIL/ZESTRIL    30 tablet    Take 1 tablet (40 mg) by mouth daily    Benign essential hypertension       metoprolol 25 MG tablet    LOPRESSOR    120 tablet    Take 2 tablets (50 mg) by mouth 2 times daily    Benign essential hypertension       omeprazole 20 MG CR capsule    priLOSEC    120 capsule    Take 2 capsules (40 mg) by mouth daily    Gastroesophageal reflux disease without esophagitis

## 2017-11-13 NOTE — NURSING NOTE
"Chief Complaint   Patient presents with     Back Pain     fell on ice and hurt back     Hypertension       Initial /84 (BP Location: Left arm, Patient Position: Sitting, Cuff Size: Adult Regular)  Pulse 66  Temp 97.2  F (36.2  C) (Tympanic)  Resp 18  Wt 181 lb (82.1 kg)  SpO2 96%  BMI 25.97 kg/m2 Estimated body mass index is 25.97 kg/(m^2) as calculated from the following:    Height as of 8/1/17: 5' 10\" (1.778 m).    Weight as of this encounter: 181 lb (82.1 kg).  Medication Reconciliation: complete   Mimi Garsia    "

## 2017-11-14 ASSESSMENT — ANXIETY QUESTIONNAIRES: GAD7 TOTAL SCORE: 4

## 2017-11-27 ENCOUNTER — TELEPHONE (OUTPATIENT)
Dept: FAMILY MEDICINE | Facility: OTHER | Age: 57
End: 2017-11-27

## 2017-11-27 DIAGNOSIS — F41.1 GAD (GENERALIZED ANXIETY DISORDER): Primary | ICD-10-CM

## 2017-11-27 RX ORDER — BUSPIRONE HYDROCHLORIDE 7.5 MG/1
7.5 TABLET ORAL 2 TIMES DAILY
Qty: 60 TABLET | Refills: 1 | Status: SHIPPED | OUTPATIENT
Start: 2017-11-27 | End: 2018-03-17

## 2017-11-27 NOTE — TELEPHONE ENCOUNTER
Patient told to stop catarpres due to side effect- abdominal pain, would like to start something else for anxiety and was transferred to scheduling for follow up in 2 weeks   per Dr. Nayan limon will be sent in   CATRACHITO WHARTON

## 2017-11-27 NOTE — TELEPHONE ENCOUNTER
4:05 PM    Reason for Call: Phone Call    Description: Pt called and states that he would like a call back regarding his anxiety medications    Was an appointment offered for this call? No  If yes : Appointment type              Date    Preferred method for responding to this message: Telephone Call  What is your phone number ?    If we cannot reach you directly, may we leave a detailed response at the number you provided? Yes    Can this message wait until your PCP/provider returns, if available today? Not applicable, PCP is in     Roxana Westside

## 2017-12-01 DIAGNOSIS — I10 ESSENTIAL HYPERTENSION, BENIGN: Primary | ICD-10-CM

## 2017-12-01 NOTE — TELEPHONE ENCOUNTER
clonodine- not on current med list please advise      Last Written Prescription Date: unknown  Last Fill Quantity: 0,  # refills: 0   Last Office Visit with [facility], [facility] or [facility] prescribing provider: 11/13/17                                         Next 5 appointments (look out 90 days)     Dec 11, 2017  9:15 AM CST   (Arrive by 9:00 AM)   Office Visit with JUSTUS Santillan MD   [facility] [place] ([facility] [place] )    [address] [place] [address]   [phone]

## 2017-12-04 RX ORDER — CLONIDINE HYDROCHLORIDE 0.1 MG/1
0.1 TABLET ORAL 2 TIMES DAILY
Qty: 60 TABLET | Refills: 1 | Status: SHIPPED | OUTPATIENT
Start: 2017-12-04 | End: 2018-03-01

## 2017-12-07 DIAGNOSIS — I10 ESSENTIAL HYPERTENSION, BENIGN: ICD-10-CM

## 2017-12-07 NOTE — TELEPHONE ENCOUNTER
HYDROCHLOROT       Last Written Prescription Date: 8/1/17  Last Fill Quantity: 30,  # refills: 3   Last Office Visit with [facility] or [facility] prescribing provider: 11/13/17

## 2017-12-08 RX ORDER — HYDROCHLOROTHIAZIDE 12.5 MG/1
TABLET ORAL
Qty: 90 TABLET | Refills: 2 | Status: SHIPPED | OUTPATIENT
Start: 2017-12-08 | End: 2019-01-01

## 2017-12-28 NOTE — TELEPHONE ENCOUNTER
Patient Information     Patient Name MRN Sex Ramo Telles [id number] Male 1960      Telephone Encounter by Maria D Lanza RN at 2017  3:34 PM     Author:  Maria D Lanza RN Service:  (none) Author Type:  NURS- Registered Nurse     Filed:  2017  4:04 PM Encounter Date:  2017 Status:  Signed     :  Maria D Lanza RN (NURS- Registered Nurse)              [facility] in [place] faxed request for refill of Linsinopril 40mg. Per chart, resident does not have a PCP assigned at this clinic and has not been seen in this clinic or had labs since 16. Per office visit notes and previous refill requests patient had switched to another provider, Dr. Gimenez. Spoke with pharmacy tech who stated they sent request to wrong provider.    Unable to complete prescription refill per RN Medication Refill Policy.................... Maria D Lanza RN ....................  2017   4:02 PM

## 2017-12-28 NOTE — TELEPHONE ENCOUNTER
Patient Information     Patient Name MRN Sex Ramo Telles [id number] Male 1960      Telephone Encounter by Flores Cortez RN at 2017  9:59 AM     Author:  Flores Cortez RN Service:  (none) Author Type:  NURS- Registered Nurse     Filed:  2017 10:11 AM Encounter Date:  2017 Status:  Signed     :  Flores Cortez RN (NURS- Registered Nurse)            Patient has no PCP at [facility].  New PCP listed is Dr. Gimenez. Pharmacy alerted. Unable to complete prescription refill per RN Medication Refill Policy.................... Flores Cortez RN ....................  2017   10:09 AM

## 2018-01-05 NOTE — TELEPHONE ENCOUNTER
Patient Information     Patient Name MRN Sex Ramo Telles [id number] Male 1960      Telephone Encounter by Flores Cortez RN at 2017 11:24 AM     Author:  Flores Cortez RN Service:  (none) Author Type:  NURS- Registered Nurse     Filed:  2017 11:38 AM Encounter Date:  2017 Status:  Signed     :  Flores Cortez RN (NURS- Registered Nurse)            Refill request for Metoprolol inappropriate. Patient has a new PCP per last refill requests. Contacted pharmacy again to today and informed that patient's new PCP is Ramo Gimenez. Pharmacy verbalized understanding and will send refill requests to patient's PCP. Unable to complete prescription refill per RN Medication Refill Policy.................... Flores Cortez RN ....................  2017   11:38 AM

## 2018-02-22 DIAGNOSIS — I10 BENIGN ESSENTIAL HYPERTENSION: ICD-10-CM

## 2018-02-23 RX ORDER — LISINOPRIL 40 MG/1
TABLET ORAL
Qty: 30 TABLET | Refills: 5 | Status: SHIPPED | OUTPATIENT
Start: 2018-02-23 | End: 2018-07-18

## 2018-03-17 ENCOUNTER — APPOINTMENT (OUTPATIENT)
Dept: CT IMAGING | Facility: HOSPITAL | Age: 58
End: 2018-03-17
Attending: FAMILY MEDICINE
Payer: MEDICARE

## 2018-03-17 ENCOUNTER — HOSPITAL ENCOUNTER (EMERGENCY)
Facility: HOSPITAL | Age: 58
Discharge: ANOTHER HOSPITAL | End: 2018-03-17
Attending: FAMILY MEDICINE | Admitting: FAMILY MEDICINE
Payer: MEDICARE

## 2018-03-17 ENCOUNTER — APPOINTMENT (OUTPATIENT)
Dept: GENERAL RADIOLOGY | Facility: HOSPITAL | Age: 58
End: 2018-03-17
Attending: FAMILY MEDICINE
Payer: MEDICARE

## 2018-03-17 ENCOUNTER — TRANSFERRED RECORDS (OUTPATIENT)
Dept: HEALTH INFORMATION MANAGEMENT | Facility: CLINIC | Age: 58
End: 2018-03-17

## 2018-03-17 VITALS
RESPIRATION RATE: 16 BRPM | WEIGHT: 175 LBS | BODY MASS INDEX: 25.11 KG/M2 | OXYGEN SATURATION: 92 % | TEMPERATURE: 100.7 F | HEART RATE: 120 BPM | SYSTOLIC BLOOD PRESSURE: 111 MMHG | DIASTOLIC BLOOD PRESSURE: 62 MMHG

## 2018-03-17 DIAGNOSIS — J15.9 COMMUNITY ACQUIRED BACTERIAL PNEUMONIA: ICD-10-CM

## 2018-03-17 LAB
ALBUMIN SERPL-MCNC: 1.9 G/DL (ref 3.4–5)
ALP SERPL-CCNC: 160 U/L (ref 40–150)
ALT SERPL W P-5'-P-CCNC: 20 U/L (ref 0–70)
ANION GAP SERPL CALCULATED.3IONS-SCNC: 9 MMOL/L (ref 3–14)
AST SERPL W P-5'-P-CCNC: 26 U/L (ref 0–45)
BASOPHILS # BLD AUTO: 0.1 10E9/L (ref 0–0.2)
BASOPHILS NFR BLD AUTO: 0.4 %
BILIRUB SERPL-MCNC: 0.3 MG/DL (ref 0.2–1.3)
BUN SERPL-MCNC: 11 MG/DL (ref 7–30)
CALCIUM SERPL-MCNC: 7.5 MG/DL (ref 8.5–10.1)
CHLORIDE SERPL-SCNC: 96 MMOL/L (ref 94–109)
CO2 SERPL-SCNC: 26 MMOL/L (ref 20–32)
CREAT SERPL-MCNC: 1.34 MG/DL (ref 0.66–1.25)
D DIMER PPP DDU-MCNC: 769 NG/ML D-DU (ref 0–300)
DIFFERENTIAL METHOD BLD: ABNORMAL
EOSINOPHIL # BLD AUTO: 0 10E9/L (ref 0–0.7)
EOSINOPHIL NFR BLD AUTO: 0.2 %
ERYTHROCYTE [DISTWIDTH] IN BLOOD BY AUTOMATED COUNT: 13.8 % (ref 10–15)
FLUAV+FLUBV AG SPEC QL: NEGATIVE
FLUAV+FLUBV AG SPEC QL: NEGATIVE
GFR SERPL CREATININE-BSD FRML MDRD: 55 ML/MIN/1.7M2
GLUCOSE SERPL-MCNC: 124 MG/DL (ref 70–99)
HCT VFR BLD AUTO: 32.4 % (ref 40–53)
HGB BLD-MCNC: 11.2 G/DL (ref 13.3–17.7)
IMM GRANULOCYTES # BLD: 0.2 10E9/L (ref 0–0.4)
IMM GRANULOCYTES NFR BLD: 0.9 %
LACTATE SERPL-SCNC: 2.1 MMOL/L (ref 0.4–2)
LYMPHOCYTES # BLD AUTO: 0.8 10E9/L (ref 0.8–5.3)
LYMPHOCYTES NFR BLD AUTO: 3.9 %
MCH RBC QN AUTO: 29.9 PG (ref 26.5–33)
MCHC RBC AUTO-ENTMCNC: 34.6 G/DL (ref 31.5–36.5)
MCV RBC AUTO: 87 FL (ref 78–100)
MONOCYTES # BLD AUTO: 2.6 10E9/L (ref 0–1.3)
MONOCYTES NFR BLD AUTO: 13.2 %
NEUTROPHILS # BLD AUTO: 16.2 10E9/L (ref 1.6–8.3)
NEUTROPHILS NFR BLD AUTO: 81.4 %
NRBC # BLD AUTO: 0 10*3/UL
NRBC BLD AUTO-RTO: 0 /100
PLATELET # BLD AUTO: 790 10E9/L (ref 150–450)
POTASSIUM SERPL-SCNC: 3.1 MMOL/L (ref 3.4–5.3)
PROT SERPL-MCNC: 6.9 G/DL (ref 6.8–8.8)
RBC # BLD AUTO: 3.74 10E12/L (ref 4.4–5.9)
SODIUM SERPL-SCNC: 131 MMOL/L (ref 133–144)
SPECIMEN SOURCE: NORMAL
WBC # BLD AUTO: 19.9 10E9/L (ref 4–11)

## 2018-03-17 PROCEDURE — 99285 EMERGENCY DEPT VISIT HI MDM: CPT | Mod: Z6 | Performed by: FAMILY MEDICINE

## 2018-03-17 PROCEDURE — 87804 INFLUENZA ASSAY W/OPTIC: CPT | Performed by: FAMILY MEDICINE

## 2018-03-17 PROCEDURE — 36415 COLL VENOUS BLD VENIPUNCTURE: CPT | Performed by: FAMILY MEDICINE

## 2018-03-17 PROCEDURE — 96375 TX/PRO/DX INJ NEW DRUG ADDON: CPT | Mod: XU

## 2018-03-17 PROCEDURE — 96365 THER/PROPH/DIAG IV INF INIT: CPT | Mod: XU

## 2018-03-17 PROCEDURE — 94640 AIRWAY INHALATION TREATMENT: CPT

## 2018-03-17 PROCEDURE — 94640 AIRWAY INHALATION TREATMENT: CPT | Mod: 76

## 2018-03-17 PROCEDURE — 85025 COMPLETE CBC W/AUTO DIFF WBC: CPT | Performed by: FAMILY MEDICINE

## 2018-03-17 PROCEDURE — 96366 THER/PROPH/DIAG IV INF ADDON: CPT

## 2018-03-17 PROCEDURE — 96368 THER/DIAG CONCURRENT INF: CPT

## 2018-03-17 PROCEDURE — 25000128 H RX IP 250 OP 636: Performed by: FAMILY MEDICINE

## 2018-03-17 PROCEDURE — 71046 X-RAY EXAM CHEST 2 VIEWS: CPT | Mod: TC

## 2018-03-17 PROCEDURE — 96376 TX/PRO/DX INJ SAME DRUG ADON: CPT | Mod: XU

## 2018-03-17 PROCEDURE — 80053 COMPREHEN METABOLIC PANEL: CPT | Performed by: FAMILY MEDICINE

## 2018-03-17 PROCEDURE — 25000132 ZZH RX MED GY IP 250 OP 250 PS 637: Mod: GY | Performed by: FAMILY MEDICINE

## 2018-03-17 PROCEDURE — 96367 TX/PROPH/DG ADDL SEQ IV INF: CPT

## 2018-03-17 PROCEDURE — A9270 NON-COVERED ITEM OR SERVICE: HCPCS | Mod: GY | Performed by: FAMILY MEDICINE

## 2018-03-17 PROCEDURE — 87040 BLOOD CULTURE FOR BACTERIA: CPT | Mod: 59 | Performed by: FAMILY MEDICINE

## 2018-03-17 PROCEDURE — 96361 HYDRATE IV INFUSION ADD-ON: CPT

## 2018-03-17 PROCEDURE — 71275 CT ANGIOGRAPHY CHEST: CPT | Mod: TC

## 2018-03-17 PROCEDURE — 83605 ASSAY OF LACTIC ACID: CPT | Performed by: FAMILY MEDICINE

## 2018-03-17 PROCEDURE — 99285 EMERGENCY DEPT VISIT HI MDM: CPT | Mod: 25

## 2018-03-17 PROCEDURE — 25000125 ZZHC RX 250: Performed by: FAMILY MEDICINE

## 2018-03-17 RX ORDER — SODIUM CHLORIDE 9 MG/ML
INJECTION, SOLUTION INTRAVENOUS CONTINUOUS
Status: DISCONTINUED | OUTPATIENT
Start: 2018-03-17 | End: 2018-03-17 | Stop reason: HOSPADM

## 2018-03-17 RX ORDER — NICOTINE 21 MG/24HR
1 PATCH, TRANSDERMAL 24 HOURS TRANSDERMAL DAILY
Status: DISCONTINUED | OUTPATIENT
Start: 2018-03-17 | End: 2018-03-17 | Stop reason: HOSPADM

## 2018-03-17 RX ORDER — LIDOCAINE 40 MG/G
CREAM TOPICAL
Status: DISCONTINUED | OUTPATIENT
Start: 2018-03-17 | End: 2018-03-17 | Stop reason: HOSPADM

## 2018-03-17 RX ORDER — IPRATROPIUM BROMIDE AND ALBUTEROL SULFATE 2.5; .5 MG/3ML; MG/3ML
3 SOLUTION RESPIRATORY (INHALATION) ONCE
Status: COMPLETED | OUTPATIENT
Start: 2018-03-17 | End: 2018-03-17

## 2018-03-17 RX ORDER — IOPAMIDOL 755 MG/ML
75 INJECTION, SOLUTION INTRAVASCULAR ONCE
Status: COMPLETED | OUTPATIENT
Start: 2018-03-17 | End: 2018-03-17

## 2018-03-17 RX ORDER — CEFTRIAXONE SODIUM 2 G/50ML
2 INJECTION, SOLUTION INTRAVENOUS ONCE
Status: COMPLETED | OUTPATIENT
Start: 2018-03-17 | End: 2018-03-17

## 2018-03-17 RX ADMIN — SODIUM CHLORIDE 1000 ML: 9 INJECTION, SOLUTION INTRAVENOUS at 06:59

## 2018-03-17 RX ADMIN — IOPAMIDOL 75 ML: 755 INJECTION, SOLUTION INTRAVENOUS at 08:05

## 2018-03-17 RX ADMIN — SODIUM CHLORIDE: 9 INJECTION, SOLUTION INTRAVENOUS at 11:24

## 2018-03-17 RX ADMIN — HYDROMORPHONE HYDROCHLORIDE 0.5 MG: 1 INJECTION, SOLUTION INTRAMUSCULAR; INTRAVENOUS; SUBCUTANEOUS at 11:23

## 2018-03-17 RX ADMIN — IPRATROPIUM BROMIDE AND ALBUTEROL SULFATE 3 ML: .5; 3 SOLUTION RESPIRATORY (INHALATION) at 06:36

## 2018-03-17 RX ADMIN — HYDROMORPHONE HYDROCHLORIDE 0.5 MG: 1 INJECTION, SOLUTION INTRAMUSCULAR; INTRAVENOUS; SUBCUTANEOUS at 10:41

## 2018-03-17 RX ADMIN — IPRATROPIUM BROMIDE AND ALBUTEROL SULFATE 3 ML: .5; 3 SOLUTION RESPIRATORY (INHALATION) at 10:53

## 2018-03-17 RX ADMIN — NICOTINE 1 PATCH: 21 PATCH, EXTENDED RELEASE TRANSDERMAL at 11:23

## 2018-03-17 RX ADMIN — AZITHROMYCIN MONOHYDRATE 500 MG: 500 INJECTION, POWDER, LYOPHILIZED, FOR SOLUTION INTRAVENOUS at 10:01

## 2018-03-17 RX ADMIN — SODIUM CHLORIDE 1000 ML: 9 INJECTION, SOLUTION INTRAVENOUS at 06:29

## 2018-03-17 RX ADMIN — TAZOBACTAM SODIUM AND PIPERACILLIN SODIUM 3.38 G: 375; 3 INJECTION, SOLUTION INTRAVENOUS at 11:23

## 2018-03-17 RX ADMIN — HYDROMORPHONE HYDROCHLORIDE 0.5 MG: 1 INJECTION, SOLUTION INTRAMUSCULAR; INTRAVENOUS; SUBCUTANEOUS at 12:58

## 2018-03-17 RX ADMIN — CEFTRIAXONE SODIUM 2 G: 2 INJECTION, SOLUTION INTRAVENOUS at 07:45

## 2018-03-17 ASSESSMENT — ENCOUNTER SYMPTOMS
RHINORRHEA: 0
FEVER: 1
CHILLS: 1
SORE THROAT: 0
HEADACHES: 1
SHORTNESS OF BREATH: 1
WEIGHT LOSS: 0
COUGH: 1
SINUS CONGESTION: 0
DIAPHORESIS: 1
WHEEZING: 1
MYALGIAS: 1

## 2018-03-17 NOTE — ED PROVIDER NOTES
I assumed care at change of shift.  The patient had a chest CT which showed a large loculated pleural effusion with probable central necrosis.  Also a question of a left spiculated lesion.  Discussed at length with the patient and his wife.  Also spoke with the hospitalist.  This will be likely a complicated drainage requiring pulmonary consult and direction.  The patient was given Zosyn and Levaquin here.  He is transferred to [facility] Dr. Pollard excepting he is transferred via ALS.     Leigh Muniz MD  03/17/18 5914

## 2018-03-17 NOTE — ED NOTES
"Pt sitting on side of bed.  Requesting to speak with MD regarding disposition.  MD aware will be in to speak with pt.  Pt also requesting \"something for pain\".  MD made aware.   "

## 2018-03-17 NOTE — ED NOTES
Facesheet has been faxed to bed placement @ [facility]     Transfer paper work printed and completed.

## 2018-03-17 NOTE — ED NOTES
Report to Atiya FRAZIER RN. Await lab draw of BC X 2 so ABX can be hung. Lab here drawing another pt. Need sputum spec.

## 2018-03-17 NOTE — ED NOTES
Pt will board r/t both [facility] and [facility] are on diversion for med-surg admissions.

## 2018-03-17 NOTE — ED NOTES
Pt verbalized understanding of need for transfer to [facility], in agreement with plan.  Pt is alert, oriented at discharge.  All abx finished infusing prior to transfer.  Pt given dose of dilaudid prior to discharge for pain 7/10 to r chest wall.

## 2018-03-17 NOTE — DISCHARGE INSTRUCTIONS
What to expect when you have contrast    During your exam, we will inject  contrast  into your vein or artery. (Contrast is a clear liquid with iodine in it. It shows up on X-rays.)    You may feel warm or hot. You may have a metal taste in your mouth and a slight upset stomach. You may also feel pressure near the kidneys and bladder. These effects will last about 1 to 3 minutes.    Please tell us if you have:    Sneezing     Itching    Hives     Swelling in the face    A hoarse voice    Breathing problems    Other new symptoms    Serious problems are rare.  They may include:    Irregular heartbeat     Seizures    Kidney failure              Tissue damage    Shock      Death    If you have any problems during the exam, we  will treat them right away.    When you get home    Call your hospital if you have any new symptoms in the next 2 days, like hives or swelling. (Phone numbers are at the bottom of this page.) Or call your family doctor.     If you have wheezing or trouble breathing, call 911.    Self-care  -Drink at least 4 extra glasses of water today.   This reduces the stress on your kidneys.  -Keep taking your regular medicines.    The contrast will pass out of your body in your  Urine(pee). This will happen in the next 24 hours. You  will not feel this. Your urine will not  change color.    If you have kidney problems or take metformin    Drink 4 to 8 large glasses of water for the next  2 days, if you are not on a fluid restriction.    ?If you take metformin (Glucophage or Glucovance) for diabetes, keep taking it.      ?Your kidney function tests are abnormal.  If you take Metformin, do not take it for 48 hours. Please go to your clinic for a blood test within 3 days after your exam before the restarting this medicine.     (Note to provider:please give patient prescription for lab tests.)    ?Special instructions: Drink 4 to 8 large glasses of water for the next  2 days, if you are not on a fluid  restriction.    I have read and understand the above information.    Patient Sign Here:______________________________________Date:________Time:______    Staff Sign Here:________________________________________Date:_______Time:______      Radiology Departments:     ?[facility]: [phone] ?[place]: [phone]     ?[place]: [phone] ?[facility]:[phone]      ?[place]: [phone]  ?[place]: [phone]  ?[place]:[phone]    ?[facility]:[phone]  ?[facility]:[phone]

## 2018-03-17 NOTE — ED NOTES
DATE:  3/17/2018   TIME OF RECEIPT FROM LAB:  0653  LAB TEST:  Lactic acid  LAB VALUE:  2.1  RESULTS GIVEN WITH READ-BACK TO (PROVIDER): [place]  TIME LAB VALUE REPORTED TO PROVIDER:   0653

## 2018-03-18 ENCOUNTER — TRANSFERRED RECORDS (OUTPATIENT)
Dept: HEALTH INFORMATION MANAGEMENT | Facility: CLINIC | Age: 58
End: 2018-03-18

## 2018-03-18 NOTE — PROGRESS NOTES
Blood culture- preliminary result was called to [facility]'s last night as patient is hospitalized there.

## 2018-03-18 NOTE — ED NOTES
Zafar CANSECO at [facility] notified of positive blood culture, gram positive cocci in clusters.

## 2018-03-22 LAB
BACTERIA SPEC CULT: ABNORMAL
SPECIMEN SOURCE: ABNORMAL

## 2018-03-23 LAB
BACTERIA SPEC CULT: NORMAL
SPECIMEN SOURCE: NORMAL

## 2018-03-27 ENCOUNTER — HOSPITAL ENCOUNTER (EMERGENCY)
Facility: HOSPITAL | Age: 58
Discharge: HOME | End: 2018-03-27
Attending: FAMILY MEDICINE | Admitting: FAMILY MEDICINE
Payer: MEDICARE

## 2018-03-27 VITALS
WEIGHT: 175 LBS | OXYGEN SATURATION: 89 % | HEART RATE: 119 BPM | BODY MASS INDEX: 25.11 KG/M2 | TEMPERATURE: 97.7 F | DIASTOLIC BLOOD PRESSURE: 87 MMHG | SYSTOLIC BLOOD PRESSURE: 124 MMHG | RESPIRATION RATE: 19 BRPM

## 2018-03-27 DIAGNOSIS — G89.18 POSTOPERATIVE PAIN: ICD-10-CM

## 2018-03-27 DIAGNOSIS — R09.02 HYPOXIA: ICD-10-CM

## 2018-03-27 PROBLEM — J15.9 COMMUNITY ACQUIRED BACTERIAL PNEUMONIA: Status: ACTIVE | Noted: 2018-03-27

## 2018-03-27 PROCEDURE — 99284 EMERGENCY DEPT VISIT MOD MDM: CPT | Mod: 25

## 2018-03-27 PROCEDURE — 96375 TX/PRO/DX INJ NEW DRUG ADDON: CPT

## 2018-03-27 PROCEDURE — 25000125 ZZHC RX 250: Performed by: FAMILY MEDICINE

## 2018-03-27 PROCEDURE — 99284 EMERGENCY DEPT VISIT MOD MDM: CPT | Mod: Z6 | Performed by: FAMILY MEDICINE

## 2018-03-27 PROCEDURE — 94640 AIRWAY INHALATION TREATMENT: CPT

## 2018-03-27 PROCEDURE — 96365 THER/PROPH/DIAG IV INF INIT: CPT

## 2018-03-27 PROCEDURE — 25000128 H RX IP 250 OP 636: Performed by: FAMILY MEDICINE

## 2018-03-27 RX ORDER — NICOTINE 21 MG/24HR
1 PATCH, TRANSDERMAL 24 HOURS TRANSDERMAL EVERY 24 HOURS
Qty: 30 PATCH | Refills: 0 | Status: SHIPPED | OUTPATIENT
Start: 2018-03-27 | End: 2018-04-03

## 2018-03-27 RX ORDER — OXYCODONE HYDROCHLORIDE 5 MG/1
5 TABLET ORAL EVERY 6 HOURS PRN
Qty: 20 TABLET | Refills: 0 | Status: SHIPPED | OUTPATIENT
Start: 2018-03-27 | End: 2018-04-03

## 2018-03-27 RX ORDER — METRONIDAZOLE 500 MG/1
500 TABLET ORAL 3 TIMES DAILY
Qty: 24 TABLET | Refills: 0 | Status: SHIPPED | OUTPATIENT
Start: 2018-03-27 | End: 2018-03-28

## 2018-03-27 RX ORDER — FENTANYL CITRATE 50 UG/ML
50 INJECTION, SOLUTION INTRAMUSCULAR; INTRAVENOUS ONCE
Status: COMPLETED | OUTPATIENT
Start: 2018-03-27 | End: 2018-03-27

## 2018-03-27 RX ORDER — CEFTRIAXONE SODIUM 2 G/50ML
2 INJECTION, SOLUTION INTRAVENOUS ONCE
Status: CANCELLED | OUTPATIENT
Start: 2018-03-27 | End: 2018-03-27

## 2018-03-27 RX ORDER — ALBUTEROL SULFATE 0.83 MG/ML
1 SOLUTION RESPIRATORY (INHALATION) EVERY 4 HOURS PRN
Qty: 1 BOX | Refills: 0 | Status: SHIPPED | OUTPATIENT
Start: 2018-03-27 | End: 2018-05-14

## 2018-03-27 RX ORDER — IPRATROPIUM BROMIDE AND ALBUTEROL SULFATE 2.5; .5 MG/3ML; MG/3ML
3 SOLUTION RESPIRATORY (INHALATION) ONCE
Status: COMPLETED | OUTPATIENT
Start: 2018-03-27 | End: 2018-03-27

## 2018-03-27 RX ORDER — CEFTRIAXONE SODIUM 2 G/50ML
2 INJECTION, SOLUTION INTRAVENOUS EVERY 24 HOURS
Status: DISCONTINUED | OUTPATIENT
Start: 2018-03-27 | End: 2018-03-27 | Stop reason: HOSPADM

## 2018-03-27 RX ADMIN — CEFTRIAXONE SODIUM 2 G: 2 INJECTION, SOLUTION INTRAVENOUS at 09:24

## 2018-03-27 RX ADMIN — FENTANYL CITRATE 50 MCG: 50 INJECTION, SOLUTION INTRAMUSCULAR; INTRAVENOUS at 09:21

## 2018-03-27 RX ADMIN — IPRATROPIUM BROMIDE AND ALBUTEROL SULFATE 3 ML: .5; 3 SOLUTION RESPIRATORY (INHALATION) at 10:18

## 2018-03-27 NOTE — ED NOTES
Arrived to ED room 3 via wheelchair with wife at side. Wife states patient was seen in our ED for a bad right lung infection that he was sent to [facility]. Patient required surgery to remove infection, had a significant hospital stay with chest tubes, and was discharged to [facility] in [place], [place] for IV antibiotics and pain control. Patient has a PICC line in place to his right arm. Wife states patient arrived at [facility] yesterday and 5 minutes later called her and stated he refused to stay and she needed to pick him up. Patient left [facility] yesterday and has not received any pain medication or IV antibiotics since then. Patient has 2 incisions on right side of chest, the top one with several staples still in place and the smaller bottom one with glue and a couple of sutures. Both incisions are clean, dry, and approximated and do not show any signs of infection at this time. Right lung sounds posteriorly are absent but are audible anteriorly and are clear but dim. Left lung sounds are clear but dim. Patient has been smoking cigarettes since leaving nursing home. States pain is unbearable and rates 8/10. Placed on oxygen via NC at 3 LPM for SpO2 at 87% on RA, current SpO2 is 95% on 3 LPM. Call light within reach.

## 2018-03-27 NOTE — ED NOTES
Spoke with [facility] in regards to home oxygen order and they state everything is in EPIC so they do not need the order faxed to them. They will fax us a form that needs to be filled out and get started on setting up home oxygen.

## 2018-03-27 NOTE — ED NOTES
Discharge instructions given. Verbalized understanding. Patient refused nebulizer. Signed prescription for oxycodone IR handed to patient with discharge paperwork. Wheeled out of ED in wheelchair by wife.

## 2018-03-27 NOTE — ED NOTES
[place] [employer] employee here to set up portable oxygen for patient to get home. Patient refused oxygen due to he might have to pay for it. Angeline from [facility] called to have CPM forms corrected and was informed that patient is refusing oxygen and so they are no longer needed. Dr. Nazario informed of patients refusal.

## 2018-03-27 NOTE — ED AVS SNAPSHOT
[facility]    [address] [address]    Phone:  [phone]                                       Ramo Berry   MRN: [id number]    Department:  [facility]   Date of Visit:  3/27/2018           After Visit Summary Signature Page     I have received my discharge instructions, and my questions have been answered. I have discussed any challenges I see with this plan with the nurse or doctor.    ..........................................................................................................................................  Patient/Patient Representative Signature      ..........................................................................................................................................  Patient Representative Print Name and Relationship to Patient    ..................................................               ................................................  Date                                            Time    ..........................................................................................................................................  Reviewed by Signature/Title    ...................................................              ..............................................  Date                                                            Time
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
## 2018-03-27 NOTE — DISCHARGE INSTRUCTIONS
Follow post op instructions as provided from [facility] ,    Followup with [facility] at upcoming appointment this Friday .     Follow up daily for scheduled Rocephin infusions . Be sure to continue your flagyl three times daily

## 2018-03-27 NOTE — ED NOTES
Phone call to [facility] to request information on patient and it went directly to supervisor voice mail. Message left for supervisor to call back as soon as possible. Phone call to [facility] medical records department to request patient information. They will be faxing over the information as soon as possible. Dr. Nazario informed that [facility] will be faxing over information.

## 2018-03-27 NOTE — ED NOTES
Wife approached nurses' station and requested pain medication for patient. States he is in so much pain and really needs something. Wife informed that the doctor has been updated on this request and she is currently with another patient and will be in to see patient as soon as she can be. Wife very understanding and pleasant.

## 2018-03-27 NOTE — PROGRESS NOTES
Written order received from Dr. Aravind Pearce for patient to receive 2 grams Rocephin starting 3/28/18-3/30/18 at which time he will follow up with PCP, Dr. VIANNEY Santillan.  Patient has PICC line in place.  He received dose starting at 0930 this AM.  Requested [facility] to schedule 3/28 and 3/29 in [facility].  Will need 3/30/18 dose scheduled as out patient as all chairs are full in infusion department.  Patient care supervisor noted.  Plan placed.  Written order sent to scanning.

## 2018-03-27 NOTE — ED AVS SNAPSHOT
[facility]    [address] [address]    Phone:  [phone]                                       Ramo Berry   MRN: [id number]    Department:  [facility]   Date of Visit:  3/27/2018           Patient Information     Date Of Birth          1960        Your diagnoses for this visit were:     Hypoxia     Postoperative pain        You were seen by Bibi German MD.      Follow-up Information     Please follow up.    Why:  with [facility] this Friday         Discharge Instructions       Follow post op instructions as provided from [facility] ,    Followup with [facility] at upcoming appointment this Friday .     Follow up daily for scheduled Rocephin infusions . Be sure to continue your flagyl three times daily       Your next 10 appointments already scheduled     Apr 06, 2018  9:30 AM CDT   (Arrive by 9:15 AM)   Office Visit with JUSTUS Santillan MD   [facility] ([facility] )    [address] [address]   [phone]           Bring a current list of meds and any records pertaining to this visit.  For Physicals, please bring immunization records and any forms needing to be filled out.  Please arrive 15 minutes early to complete paperwork and register.            May 14, 2018  9:45 AM CDT   (Arrive by 9:30 AM)   Office Visit with JUSTUS Santillan MD   [facility] ([facility] )    [address] [address]   [phone]           Bring a current list of meds and any records pertaining to this visit.  For Physicals, please bring immunization records and any forms needing to be filled out.  Please arrive 15 minutes early to complete paperwork and register.              ED Discharge Orders     Oxygen       2 L via NC continuous lifetime or until changed by primary care physician                     Review of your medicines      START taking        Dose / Directions  Last dose taken    albuterol (2.5 MG/3ML) 0.083% neb solution   Dose:  1 vial   Quantity:  1 Box        Take 1 vial (2.5 mg) by nebulization every 4 hours as needed for shortness of breath / dyspnea   Refills:  0        metroNIDAZOLE 500 MG tablet   Commonly known as:  FLAGYL   Dose:  500 mg   Quantity:  24 tablet        Take 1 tablet (500 mg) by mouth 3 times daily for 8 days   Refills:  0        nicotine 21 MG/24HR 24 hr patch   Commonly known as:  NICODERM CQ   Dose:  1 patch   Quantity:  30 patch        Place 1 patch onto the skin every 24 hours   Refills:  0        oxyCODONE IR 5 MG tablet   Commonly known as:  ROXICODONE   Dose:  5 mg   Quantity:  20 tablet        Take 1 tablet (5 mg) by mouth every 6 hours as needed for pain or severe pain   Refills:  0          Our records show that you are taking the medicines listed below. If these are incorrect, please call your family doctor or clinic.        Dose / Directions Last dose taken    amLODIPine 10 MG tablet   Commonly known as:  NORVASC   Dose:  10 mg   Quantity:  90 tablet        Take 1 tablet (10 mg) by mouth daily   Refills:  1        aspirin 325 MG tablet   Dose:  1 tablet        Take 1 tablet by mouth every 4 hours as needed. For pain   Refills:  0        hydrochlorothiazide 12.5 MG Tabs tablet   Quantity:  90 tablet        TAKE ONE TABLET BY MOUTH ONCE DAILY   Refills:  2        lisinopril 40 MG tablet   Commonly known as:  PRINIVIL/ZESTRIL   Quantity:  30 tablet        TAKE ONE TABLET BY MOUTH ONCE DAILY   Refills:  5        metoprolol tartrate 25 MG tablet   Commonly known as:  LOPRESSOR   Dose:  50 mg   Quantity:  120 tablet        Take 2 tablets (50 mg) by mouth 2 times daily   Refills:  5        omeprazole 20 MG CR capsule   Commonly known as:  priLOSEC   Dose:  40 mg   Quantity:  120 capsule        Take 2 capsules (40 mg) by mouth daily   Refills:  5                Prescriptions were sent or printed at these locations (4 Prescriptions)             "       [id number] - [place] - [address]   [address] [address]    Telephone:  [phone]   Fax:  [phone]   Hours:                  E-Prescribed (3 of 4)         albuterol (2.5 MG/3ML) 0.083% neb solution               metroNIDAZOLE (FLAGYL) 500 MG tablet               nicotine (NICODERM CQ) 21 MG/24HR 24 hr patch                 Printed at Department/Unit printer (1 of 4)         oxyCODONE IR (ROXICODONE) 5 MG tablet                Orders Needing Specimen Collection     None      Pending Results     No orders found from 3/25/2018 to 3/28/2018.            Pending Culture Results     No orders found from 3/25/2018 to 3/28/2018.            Thank you for choosing [facility]       Thank you for choosing [facility] for your care. Our goal is always to provide you with excellent care. Hearing back from our patients is one way we can continue to improve our services. Please take a few minutes to complete the written survey that you may receive in the mail after you visit with us. Thank you!        [employer] Information     [facility] lets you send messages to your doctor, view your test results, renew your prescriptions, schedule appointments and more. To sign up, go to [email or web address] . Click on \"Log in\" on the left side of the screen, which will take you to the Welcome page. Then click on \"Sign up Now\" on the right side of the page.     You will be asked to enter the access code listed below, as well as some personal information. Please follow the directions to create your username and password.     Your access code is: [email or web address]  Expires: 2018 11:03 AM     Your access code will  in 90 days. If you need help or a new code, please call your [facility] clinic or [phone].        Care EveryWhere ID     This is your Care EveryWhere ID. This could be used by other organizations to access your [facility] medical records  [id number]        Equal Access to Services     [facility] " ZACHARIAH : Polinaii jolene Bustamante, wajumada luqadaha, qaybta kafrederick torres, arin dueñas. So [facility] [phone].    ATENCIÓN: Si habla español, tiene a blackwell disposición servicios gratuitos de asistencia lingüística. Llame al [phone].    We comply with applicable federal civil rights laws and [place] laws. We do not discriminate on the basis of race, color, national origin, age, disability, sex, sexual orientation, or gender identity.            After Visit Summary       This is your record. Keep this with you and show to your community pharmacist(s) and doctor(s) at your next visit.

## 2018-03-28 ENCOUNTER — INFUSION THERAPY VISIT (OUTPATIENT)
Dept: INFUSION THERAPY | Facility: OTHER | Age: 58
End: 2018-03-28
Attending: FAMILY MEDICINE
Payer: MEDICARE

## 2018-03-28 VITALS
TEMPERATURE: 99.2 F | RESPIRATION RATE: 20 BRPM | HEART RATE: 114 BPM | WEIGHT: 169.3 LBS | HEIGHT: 70 IN | BODY MASS INDEX: 24.24 KG/M2 | DIASTOLIC BLOOD PRESSURE: 72 MMHG | OXYGEN SATURATION: 84 % | SYSTOLIC BLOOD PRESSURE: 114 MMHG

## 2018-03-28 DIAGNOSIS — J15.9 COMMUNITY ACQUIRED BACTERIAL PNEUMONIA: Primary | ICD-10-CM

## 2018-03-28 PROCEDURE — 25000128 H RX IP 250 OP 636: Performed by: FAMILY MEDICINE

## 2018-03-28 PROCEDURE — 96365 THER/PROPH/DIAG IV INF INIT: CPT

## 2018-03-28 RX ORDER — CEFTRIAXONE SODIUM 2 G/50ML
2 INJECTION, SOLUTION INTRAVENOUS ONCE
Status: COMPLETED | OUTPATIENT
Start: 2018-03-28 | End: 2018-03-28

## 2018-03-28 RX ORDER — CEFTRIAXONE SODIUM 2 G/50ML
2 INJECTION, SOLUTION INTRAVENOUS ONCE
Status: CANCELLED | OUTPATIENT
Start: 2018-03-28 | End: 2018-03-28

## 2018-03-28 RX ADMIN — CEFTRIAXONE SODIUM 2 G: 2 INJECTION, SOLUTION INTRAVENOUS at 09:01

## 2018-03-28 NOTE — PROGRESS NOTES
"Patient is a 58 year old male here accompanied by wife today for infusion of ceftriaxone 2gram per order of Dr. Aravind Pearce.  Patient meets parameters for today's infusion. Patient identified with two identifiers, order verified, and verbal consent for today's infusion obtained from patient.  Patient does have sutures and staples to right side of back and reports pain in this area, slight pinkness at these sites. Temp of 99.2, O2 sat 86% RA. Patient was offered home oxygen and nebulizer in ED yesterday and patient refused. Has f/u In [place] on 3-30-18. Recommended to patient to be evaluated in ED. Patient refuses this but states \"If I feel worse I'll go in.\" TC to patient's PCP and left voicemail with nurse to update on patient's status today.    0901: IV pump verified with ceftriaxone 2 gram dose, drug, and rate of administration with MAR and medication label.  Infusion administered per protocol.  Patient tolerated infusion well, no signs or symptoms of adverse reaction noted.  Patient denies pain nor discomfort. PICC line flushed per MAR, blood return noted.    Copy of appointments, discharge instructions, and after visit summary (AVS) provided to patient.  Patient states understanding, discharged ambulatory.  Ginny Peterson RN    "

## 2018-03-28 NOTE — MR AVS SNAPSHOT
After Visit Summary   3/28/2018    Ramo Berry    MRN: [id number]           Patient Information     Date Of Birth          1960        Visit Information        Provider Department      3/28/2018 9:00 AM HC [facility]F [facility]        Today's Diagnoses     Community acquired bacterial pneumonia    -  1      Care Instructions    If you have any questions or concerns, please do not hesitate to call us! [phone]          Follow-ups after your visit        Your next 10 appointments already scheduled     Mar 29, 2018  8:30 AM CDT   Level 2 with HC INF RM 3312   [facility] [place] ([facility] )    [address]  [place] [address]   [phone]            Mar 30, 2018  9:00 AM CDT   Level 2 with HI INFUSION    [facility] ([facility] )    [address] [place] [address]  [place] [address]   [phone]            Apr 06, 2018  9:30 AM CDT   (Arrive by 9:15 AM)   Office Visit with JUSTUS Santillan MD   [facility] [place] ([facility] )    [address]  [place] [address]   [phone]           Bring a current list of meds and any records pertaining to this visit.  For Physicals, please bring immunization records and any forms needing to be filled out.  Please arrive 15 minutes early to complete paperwork and register.            May 14, 2018  9:45 AM CDT   (Arrive by 9:30 AM)   Office Visit with JUSTUS Santillan MD   [facility] ([facility] [place] )    [address]  [place] [address]   [phone]           Bring a current list of meds and any records pertaining to this visit.  For Physicals, please bring immunization records and any forms needing to be filled out.  Please arrive 15 minutes early to complete paperwork and register.              Who to contact     If you have questions or need follow up information about today's clinic visit or your schedule please contact [facility] directly at [phone].  Normal or non-critical lab and imaging results will be communicated to you by MyChart, letter or phone within 4 business days after the clinic has received the results. If you do not hear from us within 7 days, please contact the clinic through [employer] or phone. If you have a critical or abnormal lab result, we will notify you by phone as soon as possible.  Submit refill requests through [facility] [facility] or call your pharmacy and they will forward the refill request to us. Please allow 3 business days for your refill to be completed.          Additional Information About Your Visit        [employer] Information     Join The Wellness Team lets you send messages to your doctor, view your test results, renew your prescriptions, schedule appointments and more. To sign up, go to [email or web address] . Click on \"Log in\" on the left side of the screen, which will take you to the Welcome page. Then click on \"Sign up Now\" on the right side of the page.     You will be asked to enter the access code listed below, as well as some personal information. Please follow the directions to create your username and password.     Your access code is: [email or web address]  Expires: 2018 11:03 AM     Your access code will  in 90 days. If you need help or a new code, please call your [facility] clinic or [phone].        Care EveryWhere ID     This is your Care EveryWhere ID. This could be used by other organizations to access your [facility] medical records  [id number]        Your Vitals Were     Pulse Temperature Respirations Height Pulse Oximetry BMI (Body Mass Index)    114 99.2  F (37.3  C) (Oral) 20 1.778 m (5' 10\") 84% 24.29 kg/m2       Blood Pressure from Last 3 Encounters:   18 114/72   18 124/87   18 111/62    Weight from Last 3 Encounters:   18 76.8 kg (169 lb 4.8 oz)   18 79.4 kg (175 lb)   18 79.4 kg (175 lb)              Today, you had the following     No orders " found for display       Primary Care Provider Office Phone # Fax #    R Nayan Santillan [phone] [address] [phone]        Equal Access to Services     DAVIDCAITLYN ZACHARIAH : Hadcharlotte jolene carrillo betyo Sotylerali, waaxda luqadaha, qaybta kaalmada brian, arin devine lalailaventura dueñas. So [facility] [phone].    ATENCIÓN: Si habla español, tiene a blackwell disposición servicios gratuitos de asistencia lingüística. [facility] [phone].    We comply with applicable federal civil rights laws and [place] laws. We do not discriminate on the basis of race, color, national origin, age, disability, sex, sexual orientation, or gender identity.            Thank you!     Thank you for choosing [facility]  for your care. Our goal is always to provide you with excellent care. Hearing back from our patients is one way we can continue to improve our services. Please take a few minutes to complete the written survey that you may receive in the mail after your visit with us. Thank you!             Your Updated Medication List - Protect others around you: Learn how to safely use, store and throw away your medicines at www.disposemymeds.org.          This list is accurate as of 3/28/18 10:02 AM.  Always use your most recent med list.                   Brand Name Dispense Instructions for use Diagnosis    albuterol (2.5 MG/3ML) 0.083% neb solution     1 Box    Take 1 vial (2.5 mg) by nebulization every 4 hours as needed for shortness of breath / dyspnea        amLODIPine 10 MG tablet    NORVASC    90 tablet    Take 1 tablet (10 mg) by mouth daily    Benign essential hypertension       aspirin 325 MG tablet      Take 1 tablet by mouth every 4 hours as needed. For pain        hydrochlorothiazide 12.5 MG Tabs tablet     90 tablet    TAKE ONE TABLET BY MOUTH ONCE DAILY    Essential hypertension, benign       lisinopril 40 MG tablet    PRINIVIL/ZESTRIL    30 tablet    TAKE ONE TABLET BY  MOUTH ONCE DAILY    Benign essential hypertension       metoprolol tartrate 25 MG tablet    LOPRESSOR    120 tablet    Take 2 tablets (50 mg) by mouth 2 times daily    Benign essential hypertension       METRONIDAZOLE PO      Take 500 mg by mouth 3 times daily        nicotine 21 MG/24HR 24 hr patch    NICODERM CQ    30 patch    Place 1 patch onto the skin every 24 hours        omeprazole 20 MG CR capsule    priLOSEC    120 capsule    Take 2 capsules (40 mg) by mouth daily    Gastroesophageal reflux disease without esophagitis       oxyCODONE IR 5 MG tablet    ROXICODONE    20 tablet    Take 1 tablet (5 mg) by mouth every 6 hours as needed for pain or severe pain

## 2018-03-29 ENCOUNTER — TELEPHONE (OUTPATIENT)
Dept: FAMILY MEDICINE | Facility: OTHER | Age: 58
End: 2018-03-29

## 2018-03-29 ASSESSMENT — ENCOUNTER SYMPTOMS
ACTIVITY CHANGE: 1
FATIGUE: 1
SHORTNESS OF BREATH: 1
GASTROINTESTINAL NEGATIVE: 1
NEUROLOGICAL NEGATIVE: 1

## 2018-03-29 NOTE — TELEPHONE ENCOUNTER
Patient wife left a message at 8:19am stating Ramo is not going to make it today for his rocephin infusion he is really wiped out they are planning to make his appointment tomorrow.

## 2018-03-30 ENCOUNTER — TELEPHONE (OUTPATIENT)
Dept: EMERGENCY MEDICINE | Facility: HOSPITAL | Age: 58
End: 2018-03-30

## 2018-03-30 ENCOUNTER — HOSPITAL ENCOUNTER (OUTPATIENT)
Age: 58
End: 2018-03-30
Attending: FAMILY MEDICINE
Payer: MEDICARE

## 2018-03-30 ENCOUNTER — INFUSION THERAPY VISIT (OUTPATIENT)
Dept: INFUSION THERAPY | Facility: OTHER | Age: 58
End: 2018-03-30
Attending: FAMILY MEDICINE
Payer: MEDICARE

## 2018-03-30 ENCOUNTER — HOSPITAL ENCOUNTER (EMERGENCY)
Facility: HOSPITAL | Age: 58
End: 2018-03-30
Payer: MEDICARE

## 2018-03-30 VITALS
OXYGEN SATURATION: 89 % | HEART RATE: 98 BPM | TEMPERATURE: 97.4 F | BODY MASS INDEX: 25.05 KG/M2 | DIASTOLIC BLOOD PRESSURE: 74 MMHG | WEIGHT: 175 LBS | SYSTOLIC BLOOD PRESSURE: 115 MMHG | HEIGHT: 70 IN | RESPIRATION RATE: 18 BRPM

## 2018-03-30 DIAGNOSIS — J15.9 COMMUNITY ACQUIRED BACTERIAL PNEUMONIA: Primary | ICD-10-CM

## 2018-03-30 PROCEDURE — 96365 THER/PROPH/DIAG IV INF INIT: CPT

## 2018-03-30 PROCEDURE — 25000128 H RX IP 250 OP 636: Performed by: FAMILY MEDICINE

## 2018-03-30 RX ORDER — CEFTRIAXONE SODIUM 2 G/50ML
2 INJECTION, SOLUTION INTRAVENOUS ONCE
Status: COMPLETED | OUTPATIENT
Start: 2018-03-30 | End: 2018-03-30

## 2018-03-30 RX ORDER — CEFTRIAXONE SODIUM 2 G/50ML
2 INJECTION, SOLUTION INTRAVENOUS ONCE
Status: CANCELLED | OUTPATIENT
Start: 2018-03-30 | End: 2018-03-30

## 2018-03-30 RX ADMIN — CEFTRIAXONE SODIUM 2 G: 2 INJECTION, SOLUTION INTRAVENOUS at 09:16

## 2018-03-30 NOTE — PATIENT INSTRUCTIONS
Ceftriaxone Sodium Solution for injection  What is this medicine?  CEFTRIAXONE (sef try AX one) is a cephalosporin antibiotic. It is used to treat certain kinds of bacterial infections. It will not work for colds, flu, or other viral infections.  This medicine may be used for other purposes; ask your health care provider or pharmacist if you have questions.  What should I tell my health care provider before I take this medicine?  They need to know if you have any of these conditions:    any chronic illness    bowel disease, like colitis    both kidney and liver disease    high bilirubin level in  patients    an unusual or allergic reaction to ceftriaxone, other cephalosporin or penicillin antibiotics, foods, dyes, or preservatives    pregnant or trying to get pregnant    breast-feeding  How should I use this medicine?  This medicine is injected into a muscle or infused it into a vein. It is usually given in a medical office or clinic. If you are to give this medicine you will be taught how to inject it. Follow instructions carefully. Use your doses at regular intervals. Do not take your medicine more often than directed. Do not skip doses or stop your medicine early even if you feel better. Do not stop taking except on your doctor's advice.  Talk to your pediatrician regarding the use of this medicine in children. Special care may be needed.  Overdosage: If you think you have taken too much of this medicine contact a poison control center or emergency room at once.  NOTE: This medicine is only for you. Do not share this medicine with others.  What if I miss a dose?  If you miss a dose, take it as soon as you can. If it is almost time for your next dose, take only that dose. Do not take double or extra doses.  What may interact with this medicine?  Do not take this medicine with any of the following medications:    intravenous calcium  This medicine may also interact with the following medications:    birth  control pills  This list may not describe all possible interactions. Give your health care provider a list of all the medicines, herbs, non-prescription drugs, or dietary supplements you use. Also tell them if you smoke, drink alcohol, or use illegal drugs. Some items may interact with your medicine.  What should I watch for while using this medicine?  Tell your doctor or health care professional if your symptoms do not improve or if they get worse.  Do not treat diarrhea with over the counter products. Contact your doctor if you have diarrhea that lasts more than 2 days or if it is severe and watery.  If you are being treated for a sexually transmitted disease, avoid sexual contact until you have finished your treatment. Having sex can infect your sexual partner.  Calcium may bind to this medicine and cause lung or kidney problems. Avoid calcium products while taking this medicine and for 48 hours after taking the last dose of this medicine.  What side effects may I notice from receiving this medicine?  Side effects that you should report to your doctor or health care professional as soon as possible:    allergic reactions like skin rash, itching or hives, swelling of the face, lips, or tongue    breathing problems    fever, chills    irregular heartbeat    pain when passing urine    seizures    stomach pain, cramps    unusual bleeding, bruising    unusually weak or tired  Side effects that usually do not require medical attention (report to your doctor or health care professional if they continue or are bothersome):    diarrhea    dizzy, drowsy    headache    nausea, vomiting    pain, swelling, irritation where injected    stomach upset    sweating  This list may not describe all possible side effects. Call your doctor for medical advice about side effects. You may report side effects to FDA at 9-575-PCE-2508.  NOTE:This sheet is a summary. It may not cover all possible information. If you have questions about  this medicine, talk to your doctor, pharmacist, or health care provider. Copyright  2016 Gold Standard

## 2018-03-30 NOTE — PROGRESS NOTES
Patient is a 58 year old male here accompanied by wife today for infusion of ceftriaxone 2gram per order of Dr. Aravind Pearce.  Patient meets parameters for today's infusion. Patient identified with two identifiers, order verified, and verbal consent for today's infusion obtained from patient.  Patient does have sutures and staples to right side of back and reports pain in this area, pinkness appears to have improved.      0916: IV pump verified with ceftriaxone 2 gram dose, drug, and rate of administration with MAR and medication label.  Infusion administered per protocol.  Patient tolerated infusion well, no signs or symptoms of adverse reaction noted.  Patient denies pain nor discomfort.   PICC dressing changed used aseptic technique. Site WNL. Patient tolerated well.  PICC line flushed per MAR, blood return noted.     Copy of appointments, discharge instructions, and after visit summary (AVS) provided to patient.  Patient states understanding, discharged ambulatory.  Ginny Peterson RN

## 2018-03-30 NOTE — MR AVS SNAPSHOT
After Visit Summary   3/30/2018    Ramo Berry    MRN: [id number]           Patient Information     Date Of Birth          1960        Visit Information        Provider Department      3/30/2018 9:00 AM  INF  [address] [facility]        Today's Diagnoses     Community acquired bacterial pneumonia    -  1      Care Instructions    Ceftriaxone Sodium Solution for injection  What is this medicine?  CEFTRIAXONE (sef try AX one) is a cephalosporin antibiotic. It is used to treat certain kinds of bacterial infections. It will not work for colds, flu, or other viral infections.  This medicine may be used for other purposes; ask your health care provider or pharmacist if you have questions.  What should I tell my health care provider before I take this medicine?  They need to know if you have any of these conditions:    any chronic illness    bowel disease, like colitis    both kidney and liver disease    high bilirubin level in  patients    an unusual or allergic reaction to ceftriaxone, other cephalosporin or penicillin antibiotics, foods, dyes, or preservatives    pregnant or trying to get pregnant    breast-feeding  How should I use this medicine?  This medicine is injected into a muscle or infused it into a vein. It is usually given in a medical office or clinic. If you are to give this medicine you will be taught how to inject it. Follow instructions carefully. Use your doses at regular intervals. Do not take your medicine more often than directed. Do not skip doses or stop your medicine early even if you feel better. Do not stop taking except on your doctor's advice.  Talk to your pediatrician regarding the use of this medicine in children. Special care may be needed.  Overdosage: If you think you have taken too much of this medicine contact a poison control center or emergency room at once.  NOTE: This medicine is only for you. Do not share this medicine with others.  What  if I miss a dose?  If you miss a dose, take it as soon as you can. If it is almost time for your next dose, take only that dose. Do not take double or extra doses.  What may interact with this medicine?  Do not take this medicine with any of the following medications:    intravenous calcium  This medicine may also interact with the following medications:    birth control pills  This list may not describe all possible interactions. Give your health care provider a list of all the medicines, herbs, non-prescription drugs, or dietary supplements you use. Also tell them if you smoke, drink alcohol, or use illegal drugs. Some items may interact with your medicine.  What should I watch for while using this medicine?  Tell your doctor or health care professional if your symptoms do not improve or if they get worse.  Do not treat diarrhea with over the counter products. Contact your doctor if you have diarrhea that lasts more than 2 days or if it is severe and watery.  If you are being treated for a sexually transmitted disease, avoid sexual contact until you have finished your treatment. Having sex can infect your sexual partner.  Calcium may bind to this medicine and cause lung or kidney problems. Avoid calcium products while taking this medicine and for 48 hours after taking the last dose of this medicine.  What side effects may I notice from receiving this medicine?  Side effects that you should report to your doctor or health care professional as soon as possible:    allergic reactions like skin rash, itching or hives, swelling of the face, lips, or tongue    breathing problems    fever, chills    irregular heartbeat    pain when passing urine    seizures    stomach pain, cramps    unusual bleeding, bruising    unusually weak or tired  Side effects that usually do not require medical attention (report to your doctor or health care professional if they continue or are bothersome):    diarrhea    dizzy,  drowsy    headache    nausea, vomiting    pain, swelling, irritation where injected    stomach upset    sweating  This list may not describe all possible side effects. Call your doctor for medical advice about side effects. You may report side effects to FDA at 4-603-VUK-4386.  NOTE:This sheet is a summary. It may not cover all possible information. If you have questions about this medicine, talk to your doctor, pharmacist, or health care provider. Copyright  2016 Gold Standard                Follow-ups after your visit        Your next 10 appointments already scheduled     Apr 06, 2018  9:30 AM CDT   (Arrive by 9:15 AM)   Office Visit with JUSTUS Santillan MD   [facility]    [address]  [place] [address]   [phone]           Bring a current list of meds and any records pertaining to this visit.  For Physicals, please bring immunization records and any forms needing to be filled out.  Please arrive 15 minutes early to complete paperwork and register.            May 14, 2018  9:45 AM CDT   (Arrive by 9:30 AM)   Office Visit with JUSTUS Santillan MD   [facility] [place] ([facility] [place] )    [address]  [place] [address]   [phone]           Bring a current list of meds and any records pertaining to this visit.  For Physicals, please bring immunization records and any forms needing to be filled out.  Please arrive 15 minutes early to complete paperwork and register.              Who to contact     If you have questions or need follow up information about today's clinic visit or your schedule please contact [facility] directly at [phone].  Normal or non-critical lab and imaging results will be communicated to you by MyChart, letter or phone within 4 business days after the clinic has received the results. If you do not hear from us within 7 days, please contact the clinic through MyChart or phone. If you have a  "critical or abnormal lab result, we will notify you by phone as soon as possible.  Submit refill requests through [employer] or call your pharmacy and they will forward the refill request to us. Please allow 3 business days for your refill to be completed.          Additional Information About Your Visit        MyChart Information     [employer] lets you send messages to your doctor, view your test results, renew your prescriptions, schedule appointments and more. To sign up, go to [email or web address] . Click on \"Log in\" on the left side of the screen, which will take you to the Welcome page. Then click on \"Sign up Now\" on the right side of the page.     You will be asked to enter the access code listed below, as well as some personal information. Please follow the directions to create your username and password.     Your access code is: [email or web address]  Expires: 2018 11:03 AM     Your access code will  in 90 days. If you need help or a new code, please call your [facility] clinic or [phone].        Care EveryWhere ID     This is your Care EveryWhere ID. This could be used by other organizations to access your [facility] medical records  [id number]        Your Vitals Were     Pulse Temperature Respirations Height Pulse Oximetry BMI (Body Mass Index)    98 97.4  F (36.3  C) (Oral) 18 1.778 m (5' 10\") 88% 25.11 kg/m2       Blood Pressure from Last 3 Encounters:   18 115/74   18 114/72   18 124/87    Weight from Last 3 Encounters:   18 79.4 kg (175 lb)   18 76.8 kg (169 lb 4.8 oz)   18 79.4 kg (175 lb)              Today, you had the following     No orders found for display       Primary Care Provider Office Phone # Fax #    R Nayan Santillan [phone] [address] [facility] [address]        Equal Access to Services     MADALYN SONI : Cindy Bustamante, brian diaz, qaybta kaalmada arin torres" la'ann dueñas. So [facility] [phone].    ATENCIÓN: Si habla esperanza, tiene a blackwell disposición servicios gratuitos de asistencia lingüística. Lizz deleon [phone].    We comply with applicable federal civil rights laws and [place] laws. We do not discriminate on the basis of race, color, national origin, age, disability, sex, sexual orientation, or gender identity.            Thank you!     Thank you for choosing [facility]  for your care. Our goal is always to provide you with excellent care. Hearing back from our patients is one way we can continue to improve our services. Please take a few minutes to complete the written survey that you may receive in the mail after your visit with us. Thank you!             Your Updated Medication List - Protect others around you: Learn how to safely use, store and throw away your medicines at www.disposemymeds.org.          This list is accurate as of 3/30/18  9:47 AM.  Always use your most recent med list.                   Brand Name Dispense Instructions for use Diagnosis    albuterol (2.5 MG/3ML) 0.083% neb solution     1 Box    Take 1 vial (2.5 mg) by nebulization every 4 hours as needed for shortness of breath / dyspnea        amLODIPine 10 MG tablet    NORVASC    90 tablet    Take 1 tablet (10 mg) by mouth daily    Benign essential hypertension       aspirin 325 MG tablet      Take 1 tablet by mouth every 4 hours as needed. For pain        hydrochlorothiazide 12.5 MG Tabs tablet     90 tablet    TAKE ONE TABLET BY MOUTH ONCE DAILY    Essential hypertension, benign       lisinopril 40 MG tablet    PRINIVIL/ZESTRIL    30 tablet    TAKE ONE TABLET BY MOUTH ONCE DAILY    Benign essential hypertension       metoprolol tartrate 25 MG tablet    LOPRESSOR    120 tablet    Take 2 tablets (50 mg) by mouth 2 times daily    Benign essential hypertension       METRONIDAZOLE PO      Take 500 mg by mouth 3 times daily        nicotine 21 MG/24HR 24 hr patch    NICODERM CQ    30  patch    Place 1 patch onto the skin every 24 hours        omeprazole 20 MG CR capsule    priLOSEC    120 capsule    Take 2 capsules (40 mg) by mouth daily    Gastroesophageal reflux disease without esophagitis       oxyCODONE IR 5 MG tablet    ROXICODONE    20 tablet    Take 1 tablet (5 mg) by mouth every 6 hours as needed for pain or severe pain

## 2018-03-30 NOTE — PLAN OF CARE
Received a phone call from, Janis Hayden NP [facility].  Janis, stated pt. Is to continue IV abx for another 2 weeks and needs to have labs drawn 03/31/18.  She will try and contact pt. to make him aware to continue in receiving care.  Janis, may be contacted at: [phone], she will fax orders for continued care to [facility].

## 2018-03-31 ENCOUNTER — TELEPHONE (OUTPATIENT)
Dept: EMERGENCY MEDICINE | Facility: HOSPITAL | Age: 58
End: 2018-03-31

## 2018-03-31 NOTE — ED NOTES
"Patient's wife called at 0800 3/31/18 today and states patient will not be in for infusion today. Informed wife that it is very important that patient not miss his antibiotic and asked if he would be able to make it in later today instead. Wife asked patient in the background of phone call if he would be willing to go in later today and patient could be heard saying, \"no, I'm not going in today at all.\" Again informed wife that it is very important that patient not miss any of his IV antibiotics and if he changes his mind and will come in later today to please call and let us know. Saeid, patient care supervisor, informed of patient cancelling appointment.   "

## 2018-04-01 ENCOUNTER — APPOINTMENT (OUTPATIENT)
Dept: GENERAL RADIOLOGY | Facility: HOSPITAL | Age: 58
End: 2018-04-01
Attending: FAMILY MEDICINE
Payer: MEDICARE

## 2018-04-01 ENCOUNTER — HOSPITAL ENCOUNTER (EMERGENCY)
Facility: HOSPITAL | Age: 58
Discharge: HOME | End: 2018-04-01
Attending: FAMILY MEDICINE | Admitting: FAMILY MEDICINE
Payer: MEDICARE

## 2018-04-01 VITALS
RESPIRATION RATE: 16 BRPM | SYSTOLIC BLOOD PRESSURE: 110 MMHG | TEMPERATURE: 98.4 F | DIASTOLIC BLOOD PRESSURE: 75 MMHG | WEIGHT: 175 LBS | BODY MASS INDEX: 25.11 KG/M2 | OXYGEN SATURATION: 89 % | HEART RATE: 93 BPM

## 2018-04-01 DIAGNOSIS — J86.9 EMPYEMA OF LUNG (H): ICD-10-CM

## 2018-04-01 LAB
ALBUMIN SERPL-MCNC: 1.9 G/DL (ref 3.4–5)
ALP SERPL-CCNC: 139 U/L (ref 40–150)
ALT SERPL W P-5'-P-CCNC: 16 U/L (ref 0–70)
ANION GAP SERPL CALCULATED.3IONS-SCNC: 6 MMOL/L (ref 3–14)
AST SERPL W P-5'-P-CCNC: 20 U/L (ref 0–45)
BASOPHILS # BLD AUTO: 0 10E9/L (ref 0–0.2)
BASOPHILS NFR BLD AUTO: 0.4 %
BILIRUB SERPL-MCNC: 0.2 MG/DL (ref 0.2–1.3)
BUN SERPL-MCNC: 12 MG/DL (ref 7–30)
CALCIUM SERPL-MCNC: 8.1 MG/DL (ref 8.5–10.1)
CHLORIDE SERPL-SCNC: 105 MMOL/L (ref 94–109)
CO2 SERPL-SCNC: 25 MMOL/L (ref 20–32)
CREAT SERPL-MCNC: 0.92 MG/DL (ref 0.66–1.25)
CRP SERPL-MCNC: 52.3 MG/L (ref 0–8)
DIFFERENTIAL METHOD BLD: ABNORMAL
EOSINOPHIL # BLD AUTO: 0.4 10E9/L (ref 0–0.7)
EOSINOPHIL NFR BLD AUTO: 3.9 %
ERYTHROCYTE [DISTWIDTH] IN BLOOD BY AUTOMATED COUNT: 15.3 % (ref 10–15)
GFR SERPL CREATININE-BSD FRML MDRD: 85 ML/MIN/1.7M2
GLUCOSE SERPL-MCNC: 93 MG/DL (ref 70–99)
HCT VFR BLD AUTO: 30.3 % (ref 40–53)
HGB BLD-MCNC: 9.6 G/DL (ref 13.3–17.7)
IMM GRANULOCYTES # BLD: 0.2 10E9/L (ref 0–0.4)
IMM GRANULOCYTES NFR BLD: 1.5 %
LACTATE SERPL-SCNC: 0.8 MMOL/L (ref 0.4–2)
LYMPHOCYTES # BLD AUTO: 1.3 10E9/L (ref 0.8–5.3)
LYMPHOCYTES NFR BLD AUTO: 12.9 %
MAGNESIUM SERPL-MCNC: 2 MG/DL (ref 1.6–2.3)
MCH RBC QN AUTO: 29.2 PG (ref 26.5–33)
MCHC RBC AUTO-ENTMCNC: 31.7 G/DL (ref 31.5–36.5)
MCV RBC AUTO: 92 FL (ref 78–100)
MONOCYTES # BLD AUTO: 1.5 10E9/L (ref 0–1.3)
MONOCYTES NFR BLD AUTO: 14.4 %
NEUTROPHILS # BLD AUTO: 6.7 10E9/L (ref 1.6–8.3)
NEUTROPHILS NFR BLD AUTO: 66.9 %
NRBC # BLD AUTO: 0 10*3/UL
NRBC BLD AUTO-RTO: 0 /100
PLATELET # BLD AUTO: 826 10E9/L (ref 150–450)
POTASSIUM SERPL-SCNC: 4.4 MMOL/L (ref 3.4–5.3)
PROT SERPL-MCNC: 7.1 G/DL (ref 6.8–8.8)
RBC # BLD AUTO: 3.29 10E12/L (ref 4.4–5.9)
SODIUM SERPL-SCNC: 136 MMOL/L (ref 133–144)
WBC # BLD AUTO: 10.1 10E9/L (ref 4–11)

## 2018-04-01 PROCEDURE — 94640 AIRWAY INHALATION TREATMENT: CPT

## 2018-04-01 PROCEDURE — 36415 COLL VENOUS BLD VENIPUNCTURE: CPT | Performed by: FAMILY MEDICINE

## 2018-04-01 PROCEDURE — 83735 ASSAY OF MAGNESIUM: CPT | Performed by: FAMILY MEDICINE

## 2018-04-01 PROCEDURE — 85025 COMPLETE CBC W/AUTO DIFF WBC: CPT | Performed by: FAMILY MEDICINE

## 2018-04-01 PROCEDURE — 80053 COMPREHEN METABOLIC PANEL: CPT | Performed by: FAMILY MEDICINE

## 2018-04-01 PROCEDURE — 71046 X-RAY EXAM CHEST 2 VIEWS: CPT | Mod: TC

## 2018-04-01 PROCEDURE — 99284 EMERGENCY DEPT VISIT MOD MDM: CPT | Mod: 25

## 2018-04-01 PROCEDURE — 25000128 H RX IP 250 OP 636: Performed by: FAMILY MEDICINE

## 2018-04-01 PROCEDURE — 25000125 ZZHC RX 250: Performed by: FAMILY MEDICINE

## 2018-04-01 PROCEDURE — 99284 EMERGENCY DEPT VISIT MOD MDM: CPT | Performed by: FAMILY MEDICINE

## 2018-04-01 PROCEDURE — 83605 ASSAY OF LACTIC ACID: CPT | Performed by: FAMILY MEDICINE

## 2018-04-01 PROCEDURE — 86140 C-REACTIVE PROTEIN: CPT | Performed by: FAMILY MEDICINE

## 2018-04-01 RX ORDER — CEFTRIAXONE SODIUM 2 G/50ML
2 INJECTION, SOLUTION INTRAVENOUS ONCE
Status: COMPLETED | OUTPATIENT
Start: 2018-04-01 | End: 2018-04-01

## 2018-04-01 RX ORDER — IPRATROPIUM BROMIDE AND ALBUTEROL SULFATE 2.5; .5 MG/3ML; MG/3ML
3 SOLUTION RESPIRATORY (INHALATION) ONCE
Status: COMPLETED | OUTPATIENT
Start: 2018-04-01 | End: 2018-04-01

## 2018-04-01 RX ADMIN — IPRATROPIUM BROMIDE AND ALBUTEROL SULFATE 3 ML: .5; 3 SOLUTION RESPIRATORY (INHALATION) at 09:52

## 2018-04-01 RX ADMIN — CEFTRIAXONE SODIUM 2 G: 2 INJECTION, SOLUTION INTRAVENOUS at 10:21

## 2018-04-01 ASSESSMENT — ENCOUNTER SYMPTOMS
VOMITING: 0
ABDOMINAL PAIN: 0
NEUROLOGICAL NEGATIVE: 1
SHORTNESS OF BREATH: 1
DIARRHEA: 0
CONSTIPATION: 0
DYSPHORIC MOOD: 1
NAUSEA: 0
WHEEZING: 1
FATIGUE: 1
FEVER: 0
COUGH: 1
ACTIVITY CHANGE: 1
DIAPHORESIS: 0

## 2018-04-01 NOTE — ED AVS SNAPSHOT
[facility]    [address] [address]    Phone:  [phone]                                       Ramo Berry   MRN: [id number]    Department:  [facility]   Date of Visit:  4/1/2018           After Visit Summary Signature Page     I have received my discharge instructions, and my questions have been answered. I have discussed any challenges I see with this plan with the nurse or doctor.    ..........................................................................................................................................  Patient/Patient Representative Signature      ..........................................................................................................................................  Patient Representative Print Name and Relationship to Patient    ..................................................               ................................................  Date                                            Time    ..........................................................................................................................................  Reviewed by Signature/Title    ...................................................              ..............................................  Date                                                            Time

## 2018-04-01 NOTE — ED NOTES
DC instructions reviewed with patient and wife.  They are to call their Infectious disease MD tomorrow morning to get further instructions re; additional ABX/lab needs etc.  Patient and wife v/u.  Patient also mentioned that he does not have a neb machine at home; this RN offered to obtain a new order for a neb machine and they declined at this time stating they will work with his primary so things don't get mixed up.  Patient and wife also state that they do not have the financial means at this time for home oxygen, neb machine etc.  Follow up given to .  Patient and wife with no other questions at this time.  Home to rest.

## 2018-04-01 NOTE — ED AVS SNAPSHOT
[facility]    [address] [address]    Phone:  [phone]                                       Ramo Berry   MRN: [id number]    Department:  [facility]   Date of Visit:  4/1/2018           Patient Information     Date Of Birth          1960        Your diagnoses for this visit were:     Empyema of lung (H)        You were seen by Hellen Hayden MD.      Follow-up Information     Follow up with JUSTUS Santillan MD.    Specialty:  Family Practice    Why:  As needed    Contact information:    [address] [place] [address]  [phone]        Discharge References/Attachments     ADULT, PNEUMONIA (ENGLISH)      Your next 10 appointments already scheduled     Apr 06, 2018  9:30 AM CDT   (Arrive by 9:15 AM)   Office Visit with JUSTUS Santillan MD   [facility] ([facility] )    [address] [place] [address]   [phone]           Bring a current list of meds and any records pertaining to this visit.  For Physicals, please bring immunization records and any forms needing to be filled out.  Please arrive 15 minutes early to complete paperwork and register.            May 14, 2018  9:45 AM CDT   (Arrive by 9:30 AM)   Office Visit with JUSTUS Santillan MD   [facility] ([facility] )    [address] [place] [address]   [phone]           Bring a current list of meds and any records pertaining to this visit.  For Physicals, please bring immunization records and any forms needing to be filled out.  Please arrive 15 minutes early to complete paperwork and register.                 Review of your medicines      Our records show that you are taking the medicines listed below. If these are incorrect, please call your family doctor or clinic.        Dose / Directions Last dose taken    albuterol (2.5 MG/3ML) 0.083% neb solution   Dose:  1 vial   Quantity:  1 Box         Take 1 vial (2.5 mg) by nebulization every 4 hours as needed for shortness of breath / dyspnea   Refills:  0        amLODIPine 10 MG tablet   Commonly known as:  NORVASC   Dose:  10 mg   Quantity:  90 tablet        Take 1 tablet (10 mg) by mouth daily   Refills:  1        aspirin 325 MG tablet   Dose:  1 tablet        Take 1 tablet by mouth every 4 hours as needed. For pain   Refills:  0        hydrochlorothiazide 12.5 MG Tabs tablet   Quantity:  90 tablet        TAKE ONE TABLET BY MOUTH ONCE DAILY   Refills:  2        lisinopril 40 MG tablet   Commonly known as:  PRINIVIL/ZESTRIL   Quantity:  30 tablet        TAKE ONE TABLET BY MOUTH ONCE DAILY   Refills:  5        metoprolol tartrate 25 MG tablet   Commonly known as:  LOPRESSOR   Dose:  50 mg   Quantity:  120 tablet        Take 2 tablets (50 mg) by mouth 2 times daily   Refills:  5        METRONIDAZOLE PO   Dose:  500 mg        Take 500 mg by mouth 3 times daily   Refills:  0        nicotine 21 MG/24HR 24 hr patch   Commonly known as:  NICODERM CQ   Dose:  1 patch   Quantity:  30 patch        Place 1 patch onto the skin every 24 hours   Refills:  0        omeprazole 20 MG CR capsule   Commonly known as:  priLOSEC   Dose:  40 mg   Quantity:  120 capsule        Take 2 capsules (40 mg) by mouth daily   Refills:  5        oxyCODONE IR 5 MG tablet   Commonly known as:  ROXICODONE   Dose:  5 mg   Quantity:  20 tablet        Take 1 tablet (5 mg) by mouth every 6 hours as needed for pain or severe pain   Refills:  0                Procedures and tests performed during your visit     CBC with platelets differential    CRP inflammation    Cardiac Continuous Monitoring    Comprehensive metabolic panel    Lactic acid    Magnesium    Pulse oximetry nursing    Vital signs    XR Chest 2 Views      Orders Needing Specimen Collection     None      Pending Results     No orders found from 3/30/2018 to 4/2/2018.            Pending Culture Results     No orders found from 3/30/2018  "to 2018.            Thank you for choosing [facility]       Thank you for choosing [facility] for your care. Our goal is always to provide you with excellent care. Hearing back from our patients is one way we can continue to improve our services. Please take a few minutes to complete the written survey that you may receive in the mail after you visit with us. Thank you!        [facility] Information     [facility] lets you send messages to your doctor, view your test results, renew your prescriptions, schedule appointments and more. To sign up, go to [email or web address] . Click on \"Log in\" on the left side of the screen, which will take you to the Welcome page. Then click on \"Sign up Now\" on the right side of the page.     You will be asked to enter the access code listed below, as well as some personal information. Please follow the directions to create your username and password.     Your access code is: [email or web address]  Expires: 2018 11:03 AM     Your access code will  in 90 days. If you need help or a new code, please call your [facility] clinic or [phone].        Care EveryWhere ID     This is your Care EveryWhere ID. This could be used by other organizations to access your [facility] medical records  [id number]        Equal Access to Services     MADALYN SONI : Cindy Bustamante, waaxda luqadaha, qaybta kaalmada adeegyada, arin dueñas. So [facility] [phone].    ATENCIÓN: Si habla español, tiene a blackwell disposición servicios gratuitos de asistencia lingüística. Llame al [phone].    We comply with applicable federal civil rights laws and [place] laws. We do not discriminate on the basis of race, color, national origin, age, disability, sex, sexual orientation, or gender identity.            After Visit Summary       This is your record. Keep this with you and show to your community pharmacist(s) and doctor(s) at your next visit.                  "

## 2018-04-01 NOTE — ED NOTES
Patient presents for a scheduled infusion.  Patient had an infusion plan but last dose was for 3/30/18.  Patient states he has been too weak and tired to come in for infusions.  PICC intact to left AC, labs drawn and sent to lab.  Patient states his infectious disease doctor was suppose to call over additional labs and infusions for the patient but this writer can not find them in Epic.  Rest of nursing assessment as charted.  Warm blanket given, call light within reach and wife at bedside.
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
## 2018-04-02 ENCOUNTER — INFUSION THERAPY VISIT (OUTPATIENT)
Dept: INFUSION THERAPY | Facility: OTHER | Age: 58
End: 2018-04-02
Attending: FAMILY MEDICINE
Payer: MEDICARE

## 2018-04-02 ENCOUNTER — TELEPHONE (OUTPATIENT)
Dept: INFUSION THERAPY | Facility: OTHER | Age: 58
End: 2018-04-02

## 2018-04-02 VITALS
SYSTOLIC BLOOD PRESSURE: 116 MMHG | HEIGHT: 70 IN | BODY MASS INDEX: 24.31 KG/M2 | RESPIRATION RATE: 16 BRPM | DIASTOLIC BLOOD PRESSURE: 70 MMHG | OXYGEN SATURATION: 90 % | WEIGHT: 169.8 LBS | TEMPERATURE: 98.9 F

## 2018-04-02 DIAGNOSIS — J15.9 COMMUNITY ACQUIRED BACTERIAL PNEUMONIA: Primary | ICD-10-CM

## 2018-04-02 PROCEDURE — 25000128 H RX IP 250 OP 636: Performed by: FAMILY MEDICINE

## 2018-04-02 PROCEDURE — 96365 THER/PROPH/DIAG IV INF INIT: CPT

## 2018-04-02 RX ORDER — CEFTRIAXONE SODIUM 2 G/50ML
2 INJECTION, SOLUTION INTRAVENOUS ONCE
Status: COMPLETED | OUTPATIENT
Start: 2018-04-02 | End: 2018-04-02

## 2018-04-02 RX ORDER — CEFTRIAXONE SODIUM 2 G/50ML
2 INJECTION, SOLUTION INTRAVENOUS ONCE
Status: CANCELLED | OUTPATIENT
Start: 2018-04-02 | End: 2018-04-02

## 2018-04-02 RX ADMIN — CEFTRIAXONE SODIUM 2 G: 2 INJECTION, SOLUTION INTRAVENOUS at 09:27

## 2018-04-02 NOTE — TELEPHONE ENCOUNTER
Ramo Myers was seen in the ED on 3/27/18.  At that time Dr. Nazario ordered Rocephin 2 gm daily via PICC line through 3/30/18 at which time patient was to follow up with you.  He actually ended up being seen by Janis Hayden NP at [facility] who wanted the Rocephin to continue for two more weeks.  She does not have privileges here.  He is seeing you on Friday 4/6/18.  Are you comfortable signing off on the rocephin 2 gm IV daily X two weeks per Janis Hayden as the patients PCP?  He wants to get them here at [place].  Thank you.  Britney Velasquez RN.

## 2018-04-02 NOTE — MR AVS SNAPSHOT
After Visit Summary   2018    Ramo Berry    MRN: [id number]           Patient Information     Date Of Birth          1960        Visit Information        Provider Department      2018 9:00 AM  INF RM 3310 [facility] [place]        Today's Diagnoses     Community acquired bacterial pneumonia    -  1      Care Instructions      Ceftriaxone Sodium Solution for injection  What is this medicine?  CEFTRIAXONE (sef try AX one) is a cephalosporin antibiotic. It is used to treat certain kinds of bacterial infections. It will not work for colds, flu, or other viral infections.  This medicine may be used for other purposes; ask your health care provider or pharmacist if you have questions.  What should I tell my health care provider before I take this medicine?  They need to know if you have any of these conditions:    any chronic illness    bowel disease, like colitis    both kidney and liver disease    high bilirubin level in  patients    an unusual or allergic reaction to ceftriaxone, other cephalosporin or penicillin antibiotics, foods, dyes, or preservatives    pregnant or trying to get pregnant    breast-feeding  How should I use this medicine?  This medicine is injected into a muscle or infused it into a vein. It is usually given in a medical office or clinic. If you are to give this medicine you will be taught how to inject it. Follow instructions carefully. Use your doses at regular intervals. Do not take your medicine more often than directed. Do not skip doses or stop your medicine early even if you feel better. Do not stop taking except on your doctor's advice.  Talk to your pediatrician regarding the use of this medicine in children. Special care may be needed.  Overdosage: If you think you have taken too much of this medicine contact a poison control center or emergency room at once.  NOTE: This medicine is only for you. Do not share this medicine with others.  What  if I miss a dose?  If you miss a dose, take it as soon as you can. If it is almost time for your next dose, take only that dose. Do not take double or extra doses.  What may interact with this medicine?  Do not take this medicine with any of the following medications:    intravenous calcium  This medicine may also interact with the following medications:    birth control pills  This list may not describe all possible interactions. Give your health care provider a list of all the medicines, herbs, non-prescription drugs, or dietary supplements you use. Also tell them if you smoke, drink alcohol, or use illegal drugs. Some items may interact with your medicine.  What should I watch for while using this medicine?  Tell your doctor or health care professional if your symptoms do not improve or if they get worse.  Do not treat diarrhea with over the counter products. Contact your doctor if you have diarrhea that lasts more than 2 days or if it is severe and watery.  If you are being treated for a sexually transmitted disease, avoid sexual contact until you have finished your treatment. Having sex can infect your sexual partner.  Calcium may bind to this medicine and cause lung or kidney problems. Avoid calcium products while taking this medicine and for 48 hours after taking the last dose of this medicine.  What side effects may I notice from receiving this medicine?  Side effects that you should report to your doctor or health care professional as soon as possible:    allergic reactions like skin rash, itching or hives, swelling of the face, lips, or tongue    breathing problems    fever, chills    irregular heartbeat    pain when passing urine    seizures    stomach pain, cramps    unusual bleeding, bruising    unusually weak or tired  Side effects that usually do not require medical attention (report to your doctor or health care professional if they continue or are bothersome):    diarrhea    dizzy,  drowsy    headache    nausea, vomiting    pain, swelling, irritation where injected    stomach upset    sweating  This list may not describe all possible side effects. Call your doctor for medical advice about side effects. You may report side effects to FDA at 8-295-GST-3469.  Where should I keep my medicine?  Keep out of the reach of children.  Store at room temperature below 25 degrees C (77 degrees F). Protect from light. Throw away any unused vials after the expiration date.  NOTE:This sheet is a summary. It may not cover all possible information. If you have questions about this medicine, talk to your doctor, pharmacist, or health care provider. Copyright  2016 Gold Standard                Follow-ups after your visit        Your next 10 appointments already scheduled     Apr 03, 2018  8:00 AM CDT   Level 2 with HC INF RM 3302   [facility] [place] ([facility] [place] )    [address]  [place] [address]   [phone]            Apr 04, 2018  9:00 AM CDT   Level 2 with HC INF RM 3302   [facility] [place] ([facility] [place] )    [address]  [place] [address]   [phone]            Apr 05, 2018  9:00 AM CDT   Level 2 with HC INF RM 3310   [facility] [place] ([facility] [place] )    [address]  [place] [address]   [phone]            Apr 06, 2018  9:30 AM CDT   (Arrive by 9:15 AM)   Office Visit with JUSTUS Santillan MD   [facility] [place] ([facility] [place] )    [address]  [place] [address]   [phone]           Bring a current list of meds and any records pertaining to this visit.  For Physicals, please bring immunization records and any forms needing to be filled out.  Please arrive 15 minutes early to complete paperwork and register.            Apr 06, 2018 10:00 AM CDT   Level 2 with HC INF RM 3308   [facility] [place] ([facility] [place] )    [address]  [place]  "[id number]   [phone]            Apr 07, 2018  9:00 AM CDT   PROCEDURE with HI INFUSION RM   [facility] ([facility] )    [address]  [place] [address]   [phone]            Apr 08, 2018  9:00 AM CDT   PROCEDURE with HI INFUSION RM   [facility] ([facility] )    [address]  [place] [address]   [phone]            Apr 09, 2018  8:30 AM CDT   Level 2 with HC INF RM 3308   [facility] [place] ([facility] [place] )    [address]  [place] [address]   [phone]            Apr 10, 2018  9:00 AM CDT   Level 2 with HC INF RM 3310   [facility] [place] ([facility] [place] )    [address]  [place] [address]   [phone]            Apr 11, 2018  9:00 AM CDT   Level 2 with HC INF RM 3312   [facility] [place] ([facility] [place] )    [address]  [place] [address]   [phone]              Who to contact     If you have questions or need follow up information about today's clinic visit or your schedule please contact [facility] directly at [phone].  Normal or non-critical lab and imaging results will be communicated to you by [employer], letter or phone within 4 business days after the clinic has received the results. If you do not hear from us within 7 days, please contact the clinic through [employer] or phone. If you have a critical or abnormal lab result, we will notify you by phone as soon as possible.  Submit refill requests through [employer] or call your pharmacy and they will forward the refill request to us. Please allow 3 business days for your refill to be completed.          Additional Information About Your Visit        [employer] Information     [employer] lets you send messages to your doctor, view your test results, renew your prescriptions, schedule appointments and more. To sign up, go to [email or web address] . Click on \"Log in\" on the left side of the screen, " "which will take you to the Welcome page. Then click on \"Sign up Now\" on the right side of the page.     You will be asked to enter the access code listed below, as well as some personal information. Please follow the directions to create your username and password.     Your access code is: [email or web address]  Expires: 2018 11:03 AM     Your access code will  in 90 days. If you need help or a new code, please call your [place] clinic or [phone].        Care EveryWhere ID     This is your Care EveryWhere ID. This could be used by other organizations to access your [place] medical records  [id number]        Your Vitals Were     Temperature Respirations Height Pulse Oximetry BMI (Body Mass Index)       98.9  F (37.2  C) 16 1.778 m (5' 10\") 90% 24.36 kg/m2        Blood Pressure from Last 3 Encounters:   18 119/76   18 110/75   18 115/74    Weight from Last 3 Encounters:   18 77 kg (169 lb 12.8 oz)   18 79.4 kg (175 lb)   18 79.4 kg (175 lb)              Today, you had the following     No orders found for display       Primary Care Provider Office Phone # Fax #    R Nayan Santillan [phone]       [address] [place] [address]        Equal Access to Services     [facility] AH: Hadii jolene ku hadasho Sotylerali, waaxda luqadaha, qaybta kaalmada adeallenda, arin pate . So [facility] [phone].    ATENCIÓN: Si habla español, tiene a blackwell disposición servicios gratuitos de asistencia lingüística. Llame al [phone].    We comply with applicable federal civil rights laws and [place] laws. We do not discriminate on the basis of race, color, national origin, age, disability, sex, sexual orientation, or gender identity.            Thank you!     Thank you for choosing [facility]  for your care. Our goal is always to provide you with excellent care. Hearing back from our patients is one way we can continue to improve " our services. Please take a few minutes to complete the written survey that you may receive in the mail after your visit with us. Thank you!             Your Updated Medication List - Protect others around you: Learn how to safely use, store and throw away your medicines at www.disposemymeds.org.          This list is accurate as of 4/2/18  9:51 AM.  Always use your most recent med list.                   Brand Name Dispense Instructions for use Diagnosis    albuterol (2.5 MG/3ML) 0.083% neb solution     1 Box    Take 1 vial (2.5 mg) by nebulization every 4 hours as needed for shortness of breath / dyspnea        amLODIPine 10 MG tablet    NORVASC    90 tablet    Take 1 tablet (10 mg) by mouth daily    Benign essential hypertension       aspirin 325 MG tablet      Take 1 tablet by mouth every 4 hours as needed. For pain        hydrochlorothiazide 12.5 MG Tabs tablet     90 tablet    TAKE ONE TABLET BY MOUTH ONCE DAILY    Essential hypertension, benign       lisinopril 40 MG tablet    PRINIVIL/ZESTRIL    30 tablet    TAKE ONE TABLET BY MOUTH ONCE DAILY    Benign essential hypertension       metoprolol tartrate 25 MG tablet    LOPRESSOR    120 tablet    Take 2 tablets (50 mg) by mouth 2 times daily    Benign essential hypertension       METRONIDAZOLE PO      Take 500 mg by mouth 3 times daily        nicotine 21 MG/24HR 24 hr patch    NICODERM CQ    30 patch    Place 1 patch onto the skin every 24 hours        omeprazole 20 MG CR capsule    priLOSEC    120 capsule    Take 2 capsules (40 mg) by mouth daily    Gastroesophageal reflux disease without esophagitis       oxyCODONE IR 5 MG tablet    ROXICODONE    20 tablet    Take 1 tablet (5 mg) by mouth every 6 hours as needed for pain or severe pain

## 2018-04-02 NOTE — PATIENT INSTRUCTIONS
Ceftriaxone Sodium Solution for injection  What is this medicine?  CEFTRIAXONE (sef try AX one) is a cephalosporin antibiotic. It is used to treat certain kinds of bacterial infections. It will not work for colds, flu, or other viral infections.  This medicine may be used for other purposes; ask your health care provider or pharmacist if you have questions.  What should I tell my health care provider before I take this medicine?  They need to know if you have any of these conditions:    any chronic illness    bowel disease, like colitis    both kidney and liver disease    high bilirubin level in  patients    an unusual or allergic reaction to ceftriaxone, other cephalosporin or penicillin antibiotics, foods, dyes, or preservatives    pregnant or trying to get pregnant    breast-feeding  How should I use this medicine?  This medicine is injected into a muscle or infused it into a vein. It is usually given in a medical office or clinic. If you are to give this medicine you will be taught how to inject it. Follow instructions carefully. Use your doses at regular intervals. Do not take your medicine more often than directed. Do not skip doses or stop your medicine early even if you feel better. Do not stop taking except on your doctor's advice.  Talk to your pediatrician regarding the use of this medicine in children. Special care may be needed.  Overdosage: If you think you have taken too much of this medicine contact a poison control center or emergency room at once.  NOTE: This medicine is only for you. Do not share this medicine with others.  What if I miss a dose?  If you miss a dose, take it as soon as you can. If it is almost time for your next dose, take only that dose. Do not take double or extra doses.  What may interact with this medicine?  Do not take this medicine with any of the following medications:    intravenous calcium  This medicine may also interact with the following medications:    birth  control pills  This list may not describe all possible interactions. Give your health care provider a list of all the medicines, herbs, non-prescription drugs, or dietary supplements you use. Also tell them if you smoke, drink alcohol, or use illegal drugs. Some items may interact with your medicine.  What should I watch for while using this medicine?  Tell your doctor or health care professional if your symptoms do not improve or if they get worse.  Do not treat diarrhea with over the counter products. Contact your doctor if you have diarrhea that lasts more than 2 days or if it is severe and watery.  If you are being treated for a sexually transmitted disease, avoid sexual contact until you have finished your treatment. Having sex can infect your sexual partner.  Calcium may bind to this medicine and cause lung or kidney problems. Avoid calcium products while taking this medicine and for 48 hours after taking the last dose of this medicine.  What side effects may I notice from receiving this medicine?  Side effects that you should report to your doctor or health care professional as soon as possible:    allergic reactions like skin rash, itching or hives, swelling of the face, lips, or tongue    breathing problems    fever, chills    irregular heartbeat    pain when passing urine    seizures    stomach pain, cramps    unusual bleeding, bruising    unusually weak or tired  Side effects that usually do not require medical attention (report to your doctor or health care professional if they continue or are bothersome):    diarrhea    dizzy, drowsy    headache    nausea, vomiting    pain, swelling, irritation where injected    stomach upset    sweating  This list may not describe all possible side effects. Call your doctor for medical advice about side effects. You may report side effects to FDA at 4-287-JPK-4257.  Where should I keep my medicine?  Keep out of the reach of children.  Store at room temperature below 25  degrees C (77 degrees F). Protect from light. Throw away any unused vials after the expiration date.  NOTE:This sheet is a summary. It may not cover all possible information. If you have questions about this medicine, talk to your doctor, pharmacist, or health care provider. Copyright  2016 Gold Standard

## 2018-04-02 NOTE — PROGRESS NOTES
Patient is a 58 year old male here accompanied by wife today for infusion of ceftriaxone 2gram per order of Myranda Hayden CNP, co-signed by Dr. Yan received for infusion treatments on 4/2/18-4/3/18. Patient meets parameters for today's infusion. Patient identified with two identifiers, order verified, and verbal consent for today's infusion obtained from patient.         0927: IV pump verified with ceftriaxone 2 gram dose, drug, and rate of administration with MAR and medication label.  Infusion administered per protocol.  Patient tolerated infusion well, no signs or symptoms of adverse reaction noted.  Patient denies pain nor discomfort.   PICC dressing changed used aseptic technique. Site WNL. Patient tolerated well. PICC line flushed per MAR, blood return noted.  Copy of appointments, discharge instructions, and after visit summary (AVS) provided to patient.  Patient states understanding, discharged ambulatory.

## 2018-04-03 ENCOUNTER — MEDICAL CORRESPONDENCE (OUTPATIENT)
Dept: HEALTH INFORMATION MANAGEMENT | Facility: CLINIC | Age: 58
End: 2018-04-03

## 2018-04-03 ENCOUNTER — HOSPITAL ENCOUNTER (EMERGENCY)
Facility: HOSPITAL | Age: 58
Discharge: LEFT BEFORE DISCHARGE | End: 2018-04-03
Attending: PHYSICIAN ASSISTANT | Admitting: PHYSICIAN ASSISTANT
Payer: MEDICARE

## 2018-04-03 ENCOUNTER — INFUSION THERAPY VISIT (OUTPATIENT)
Dept: INFUSION THERAPY | Facility: OTHER | Age: 58
End: 2018-04-03
Attending: FAMILY MEDICINE
Payer: MEDICARE

## 2018-04-03 ENCOUNTER — TELEPHONE (OUTPATIENT)
Dept: FAMILY MEDICINE | Facility: OTHER | Age: 58
End: 2018-04-03

## 2018-04-03 VITALS
SYSTOLIC BLOOD PRESSURE: 128 MMHG | RESPIRATION RATE: 16 BRPM | TEMPERATURE: 97.1 F | DIASTOLIC BLOOD PRESSURE: 86 MMHG | OXYGEN SATURATION: 93 %

## 2018-04-03 VITALS
RESPIRATION RATE: 16 BRPM | DIASTOLIC BLOOD PRESSURE: 81 MMHG | TEMPERATURE: 97.7 F | SYSTOLIC BLOOD PRESSURE: 122 MMHG | HEART RATE: 113 BPM | OXYGEN SATURATION: 91 %

## 2018-04-03 DIAGNOSIS — J15.9 COMMUNITY ACQUIRED BACTERIAL PNEUMONIA: Primary | ICD-10-CM

## 2018-04-03 PROCEDURE — 25000128 H RX IP 250 OP 636: Performed by: FAMILY MEDICINE

## 2018-04-03 PROCEDURE — 40000268 ZZH STATISTIC NO CHARGES

## 2018-04-03 PROCEDURE — 96365 THER/PROPH/DIAG IV INF INIT: CPT

## 2018-04-03 RX ORDER — CEFTRIAXONE SODIUM 2 G/50ML
2 INJECTION, SOLUTION INTRAVENOUS ONCE
Status: COMPLETED | OUTPATIENT
Start: 2018-04-03 | End: 2018-04-03

## 2018-04-03 RX ORDER — CEFTRIAXONE SODIUM 2 G/50ML
2 INJECTION, SOLUTION INTRAVENOUS ONCE
Status: CANCELLED | OUTPATIENT
Start: 2018-04-03 | End: 2018-04-03

## 2018-04-03 RX ADMIN — CEFTRIAXONE SODIUM 2 G: 2 INJECTION, SOLUTION INTRAVENOUS at 08:01

## 2018-04-03 NOTE — PROGRESS NOTES
Patient is a 58 year old male here accompanied by wife today for infusion of ceftriaxone 2gram per order of Myranda Hayden CNP, co-signed by Dr. Brock. Patient meets parameters for today's infusion. Patient identified with two identifiers, order verified, and verbal consent for today's infusion obtained from patient.   Surgical site to right back is open with scant serosanguinous drainage, no s/s of infection. Site covered with dry 4/4 gauze and medipore tape. Patient educated on s/s of infection to monitor at home.       0801: IV pump verified with ceftriaxone 2 gram dose, drug, and rate of administration with MAR and medication label.  Infusion administered per protocol.  Patient tolerated infusion well, no signs or symptoms of adverse reaction noted.  Patient denies pain nor discomfort.   PICC line flushed per MAR, blood return noted.  Copy of appointments, discharge instructions, and after visit summary (AVS) provided to patient.  Patient states understanding, discharged ambulatory.

## 2018-04-03 NOTE — TELEPHONE ENCOUNTER
Update from infusion- patient is being seen for rocephin treatment due to lung infection- managed by Pieter. Patient was seen on Friday 3/30/18 in infusion. With having symptoms and unable to get oxy sats up patient was advised to go the the ER. Declined, patient was seen on 4/01/2018 dx with Empyema of lung. Patient is seeing infusion today, will call with any concerns. Patient has a follow up scheduled for Friday.

## 2018-04-03 NOTE — PATIENT INSTRUCTIONS
Ceftriaxone Sodium Solution for injection  What is this medicine?  CEFTRIAXONE (sef try AX one) is a cephalosporin antibiotic. It is used to treat certain kinds of bacterial infections. It will not work for colds, flu, or other viral infections.  This medicine may be used for other purposes; ask your health care provider or pharmacist if you have questions.  What should I tell my health care provider before I take this medicine?  They need to know if you have any of these conditions:    any chronic illness    bowel disease, like colitis    both kidney and liver disease    high bilirubin level in  patients    an unusual or allergic reaction to ceftriaxone, other cephalosporin or penicillin antibiotics, foods, dyes, or preservatives    pregnant or trying to get pregnant    breast-feeding  How should I use this medicine?  This medicine is injected into a muscle or infused it into a vein. It is usually given in a medical office or clinic. If you are to give this medicine you will be taught how to inject it. Follow instructions carefully. Use your doses at regular intervals. Do not take your medicine more often than directed. Do not skip doses or stop your medicine early even if you feel better. Do not stop taking except on your doctor's advice.  Talk to your pediatrician regarding the use of this medicine in children. Special care may be needed.  Overdosage: If you think you have taken too much of this medicine contact a poison control center or emergency room at once.  NOTE: This medicine is only for you. Do not share this medicine with others.  What if I miss a dose?  If you miss a dose, take it as soon as you can. If it is almost time for your next dose, take only that dose. Do not take double or extra doses.  What may interact with this medicine?  Do not take this medicine with any of the following medications:    intravenous calcium  This medicine may also interact with the following medications:    birth  control pills  This list may not describe all possible interactions. Give your health care provider a list of all the medicines, herbs, non-prescription drugs, or dietary supplements you use. Also tell them if you smoke, drink alcohol, or use illegal drugs. Some items may interact with your medicine.  What should I watch for while using this medicine?  Tell your doctor or health care professional if your symptoms do not improve or if they get worse.  Do not treat diarrhea with over the counter products. Contact your doctor if you have diarrhea that lasts more than 2 days or if it is severe and watery.  If you are being treated for a sexually transmitted disease, avoid sexual contact until you have finished your treatment. Having sex can infect your sexual partner.  Calcium may bind to this medicine and cause lung or kidney problems. Avoid calcium products while taking this medicine and for 48 hours after taking the last dose of this medicine.  What side effects may I notice from receiving this medicine?  Side effects that you should report to your doctor or health care professional as soon as possible:    allergic reactions like skin rash, itching or hives, swelling of the face, lips, or tongue    breathing problems    fever, chills    irregular heartbeat    pain when passing urine    seizures    stomach pain, cramps    unusual bleeding, bruising    unusually weak or tired  Side effects that usually do not require medical attention (report to your doctor or health care professional if they continue or are bothersome):    diarrhea    dizzy, drowsy    headache    nausea, vomiting    pain, swelling, irritation where injected    stomach upset    sweating  This list may not describe all possible side effects. Call your doctor for medical advice about side effects. You may report side effects to FDA at 4-008-RPB-1671.  NOTE:This sheet is a summary. It may not cover all possible information. If you have questions about  this medicine, talk to your doctor, pharmacist, or health care provider. Copyright  2016 Gold Standard

## 2018-04-03 NOTE — MR AVS SNAPSHOT
After Visit Summary   4/3/2018    Ramo Berry    MRN: [id number]           Patient Information     Date Of Birth          1960        Visit Information        Provider Department      4/3/2018 8:00 AM  INF RM 3302 [facility] [place]        Today's Diagnoses     Community acquired bacterial pneumonia    -  1      Care Instructions    Ceftriaxone Sodium Solution for injection  What is this medicine?  CEFTRIAXONE (sef try AX one) is a cephalosporin antibiotic. It is used to treat certain kinds of bacterial infections. It will not work for colds, flu, or other viral infections.  This medicine may be used for other purposes; ask your health care provider or pharmacist if you have questions.  What should I tell my health care provider before I take this medicine?  They need to know if you have any of these conditions:    any chronic illness    bowel disease, like colitis    both kidney and liver disease    high bilirubin level in  patients    an unusual or allergic reaction to ceftriaxone, other cephalosporin or penicillin antibiotics, foods, dyes, or preservatives    pregnant or trying to get pregnant    breast-feeding  How should I use this medicine?  This medicine is injected into a muscle or infused it into a vein. It is usually given in a medical office or clinic. If you are to give this medicine you will be taught how to inject it. Follow instructions carefully. Use your doses at regular intervals. Do not take your medicine more often than directed. Do not skip doses or stop your medicine early even if you feel better. Do not stop taking except on your doctor's advice.  Talk to your pediatrician regarding the use of this medicine in children. Special care may be needed.  Overdosage: If you think you have taken too much of this medicine contact a poison control center or emergency room at once.  NOTE: This medicine is only for you. Do not share this medicine with others.  What if  I miss a dose?  If you miss a dose, take it as soon as you can. If it is almost time for your next dose, take only that dose. Do not take double or extra doses.  What may interact with this medicine?  Do not take this medicine with any of the following medications:    intravenous calcium  This medicine may also interact with the following medications:    birth control pills  This list may not describe all possible interactions. Give your health care provider a list of all the medicines, herbs, non-prescription drugs, or dietary supplements you use. Also tell them if you smoke, drink alcohol, or use illegal drugs. Some items may interact with your medicine.  What should I watch for while using this medicine?  Tell your doctor or health care professional if your symptoms do not improve or if they get worse.  Do not treat diarrhea with over the counter products. Contact your doctor if you have diarrhea that lasts more than 2 days or if it is severe and watery.  If you are being treated for a sexually transmitted disease, avoid sexual contact until you have finished your treatment. Having sex can infect your sexual partner.  Calcium may bind to this medicine and cause lung or kidney problems. Avoid calcium products while taking this medicine and for 48 hours after taking the last dose of this medicine.  What side effects may I notice from receiving this medicine?  Side effects that you should report to your doctor or health care professional as soon as possible:    allergic reactions like skin rash, itching or hives, swelling of the face, lips, or tongue    breathing problems    fever, chills    irregular heartbeat    pain when passing urine    seizures    stomach pain, cramps    unusual bleeding, bruising    unusually weak or tired  Side effects that usually do not require medical attention (report to your doctor or health care professional if they continue or are bothersome):    diarrhea    dizzy,  drowsy    headache    nausea, vomiting    pain, swelling, irritation where injected    stomach upset    sweating  This list may not describe all possible side effects. Call your doctor for medical advice about side effects. You may report side effects to FDA at 4-735-BXQ-1739.  NOTE:This sheet is a summary. It may not cover all possible information. If you have questions about this medicine, talk to your doctor, pharmacist, or health care provider. Copyright  2016 Gold Standard                Follow-ups after your visit        Your next 10 appointments already scheduled     Apr 04, 2018  9:00 AM CDT   Level 2 with HC INF RM 3302   [facility] [place] ([facility] [place] )    [address]  [place] [address]   [phone]            Apr 05, 2018  9:00 AM CDT   Level 2 with HC INF RM 3310   [facility] [place] ([facility] [place] )    [address]  [place] [address]   [phone]            Apr 06, 2018  9:30 AM CDT   (Arrive by 9:15 AM)   Office Visit with JUSTUS Santillan MD   [facility] [place] ([facility] [place] )    [address]  [place] [address]   [phone]           Bring a current list of meds and any records pertaining to this visit.  For Physicals, please bring immunization records and any forms needing to be filled out.  Please arrive 15 minutes early to complete paperwork and register.            Apr 06, 2018 10:00 AM CDT   Level 2 with HC INF RM 3308   [facility] [place] ([facility] [place] )    [address]  [place] [address]   [phone]            Apr 07, 2018  9:00 AM CDT   PROCEDURE with [facility] INFUSION RM   [facility] ([facility] )    [address]  [place] [address]   [phone]            Apr 08, 2018  9:00 AM CDT   PROCEDURE with [facility] INFUSION RM   [facility] ([facility] )    [address]  [place] [address]   [phone]            Apr 09, 2018  8:30 AM  "CDT   Level 2 with HC INF RM 3308   [facility] [place] ([facility] [place] )    [address]  [place] [address]   [phone]            Apr 10, 2018  9:00 AM CDT   Level 2 with HC INF RM 3310   [facility] [place] ([facility] [place] )    [address]  [place] [address]   [phone]            Apr 11, 2018  9:00 AM CDT   Level 2 with HC INF RM 3312   [facility] [place] ([facility] [place] )    [address]  [place] [address]   [phone]            Apr 12, 2018  8:30 AM CDT   Level 2 with HC INF RM 3302   [facility] [place] ([facility] [place] )    [address]  [place] [address]   [phone]              Who to contact     If you have questions or need follow up information about today's clinic visit or your schedule please contact [facility] directly at [phone].  Normal or non-critical lab and imaging results will be communicated to you by AdiCytehart, letter or phone within 4 business days after the clinic has received the results. If you do not hear from us within 7 days, please contact the clinic through PicsaStockt or phone. If you have a critical or abnormal lab result, we will notify you by phone as soon as possible.  Submit refill requests through Arieso or call your pharmacy and they will forward the refill request to us. Please allow 3 business days for your refill to be completed.          Additional Information About Your Visit        [facility] Information     Arieso lets you send messages to your doctor, view your test results, renew your prescriptions, schedule appointments and more. To sign up, go to [email or web address] . Click on \"Log in\" on the left side of the screen, which will take you to the Welcome page. Then click on \"Sign up Now\" on the right side of the page.     You will be asked to enter the access code listed below, as well as some personal information. Please follow the " directions to create your username and password.     Your access code is: [email or web address]  Expires: 2018 11:03 AM     Your access code will  in 90 days. If you need help or a new code, please call your [place] clinic or [phone].        Care EveryWhere ID     This is your Care EveryWhere ID. This could be used by other organizations to access your [place] medical records  [id number]        Your Vitals Were     Pulse Temperature Respirations Pulse Oximetry          113 97.7  F (36.5  C) (Oral) 16 91%         Blood Pressure from Last 3 Encounters:   18 128/86   18 122/81   18 116/70    Weight from Last 3 Encounters:   18 77 kg (169 lb 12.8 oz)   18 79.4 kg (175 lb)   18 79.4 kg (175 lb)              Today, you had the following     No orders found for display       Primary Care Provider Office Phone # Fax #    R Nayan Santillan [phone]       [facility] [place] [address]        Equal Access to Services     [facility] AH: Hadii aad ku hadasho Somaria elena, waaxda luqadaha, qaybta kaalmada adeegyada, arin pate . So [facility] [phone].    ATENCIÓN: Si habla español, tiene a blackwell disposición servicios gratuitos de asistencia lingüística. [facility] [phone].    We comply with applicable federal civil rights laws and [place] laws. We do not discriminate on the basis of race, color, national origin, age, disability, sex, sexual orientation, or gender identity.            Thank you!     Thank you for choosing [facility]  for your care. Our goal is always to provide you with excellent care. Hearing back from our patients is one way we can continue to improve our services. Please take a few minutes to complete the written survey that you may receive in the mail after your visit with us. Thank you!             Your Updated Medication List - Protect others around you: Learn how to safely use, store and throw  away your medicines at www.disposemymeds.org.          This list is accurate as of 4/3/18 11:48 AM.  Always use your most recent med list.                   Brand Name Dispense Instructions for use Diagnosis    albuterol (2.5 MG/3ML) 0.083% neb solution     1 Box    Take 1 vial (2.5 mg) by nebulization every 4 hours as needed for shortness of breath / dyspnea        amLODIPine 10 MG tablet    NORVASC    90 tablet    Take 1 tablet (10 mg) by mouth daily    Benign essential hypertension       aspirin 325 MG tablet      Take 1 tablet by mouth every 4 hours as needed. For pain        hydrochlorothiazide 12.5 MG Tabs tablet     90 tablet    TAKE ONE TABLET BY MOUTH ONCE DAILY    Essential hypertension, benign       lisinopril 40 MG tablet    PRINIVIL/ZESTRIL    30 tablet    TAKE ONE TABLET BY MOUTH ONCE DAILY    Benign essential hypertension       metoprolol tartrate 25 MG tablet    LOPRESSOR    120 tablet    Take 2 tablets (50 mg) by mouth 2 times daily    Benign essential hypertension       METRONIDAZOLE PO      Take 500 mg by mouth 3 times daily        omeprazole 20 MG CR capsule    priLOSEC    120 capsule    Take 2 capsules (40 mg) by mouth daily    Gastroesophageal reflux disease without esophagitis

## 2018-04-04 ENCOUNTER — HOSPITAL ENCOUNTER (EMERGENCY)
Facility: HOSPITAL | Age: 58
Discharge: HOME | End: 2018-04-04
Attending: INTERNAL MEDICINE | Admitting: INTERNAL MEDICINE
Payer: MEDICARE

## 2018-04-04 ENCOUNTER — TELEPHONE (OUTPATIENT)
Dept: INFUSION THERAPY | Facility: OTHER | Age: 58
End: 2018-04-04

## 2018-04-04 VITALS
OXYGEN SATURATION: 90 % | TEMPERATURE: 97.8 F | RESPIRATION RATE: 12 BRPM | DIASTOLIC BLOOD PRESSURE: 92 MMHG | SYSTOLIC BLOOD PRESSURE: 130 MMHG

## 2018-04-04 DIAGNOSIS — R07.1 CHEST PAIN ON BREATHING: ICD-10-CM

## 2018-04-04 PROCEDURE — 99282 EMERGENCY DEPT VISIT SF MDM: CPT

## 2018-04-04 PROCEDURE — 99282 EMERGENCY DEPT VISIT SF MDM: CPT | Mod: Z6 | Performed by: INTERNAL MEDICINE

## 2018-04-04 RX ORDER — CEFTRIAXONE SODIUM 2 G/50ML
2 INJECTION, SOLUTION INTRAVENOUS ONCE
Status: CANCELLED | OUTPATIENT
Start: 2018-04-04 | End: 2018-04-04

## 2018-04-04 ASSESSMENT — ENCOUNTER SYMPTOMS
FLANK PAIN: 0
SLEEP DISTURBANCE: 0
WEAKNESS: 0
NAUSEA: 0
BACK PAIN: 0
MYALGIAS: 0
CHILLS: 0
CHEST TIGHTNESS: 0
NUMBNESS: 0
ANAL BLEEDING: 0
NECK PAIN: 0
COUGH: 0
FEVER: 0
ABDOMINAL DISTENTION: 0
DIZZINESS: 0
SHORTNESS OF BREATH: 0
VOMITING: 0
LIGHT-HEADEDNESS: 0
HEADACHES: 0
VOICE CHANGE: 0
DIAPHORESIS: 0
ABDOMINAL PAIN: 0
FREQUENCY: 0
BLOOD IN STOOL: 0
DYSURIA: 0
COLOR CHANGE: 0
PALPITATIONS: 0
CONFUSION: 0
WHEEZING: 0

## 2018-04-04 NOTE — TELEPHONE ENCOUNTER
Updated Ellen Hayden NP at [facility], that patient did not come in for infusion today, and also that he was seen in ED at 2:00 am today and left before chest xray could be performed.

## 2018-04-04 NOTE — ED NOTES
"Spouse is wheeling pt out the door in wheelchair. Pt states he is going home because he can't stand to wait any longer. When asked if he had his xray yet, states no and he is not willing to wait for it because he is in too much pain. When asked how going home is going to help his pain, pt was vague in answer and told his wife, \"hurry up and get me out of here\".  "
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
## 2018-04-05 ENCOUNTER — TELEPHONE (OUTPATIENT)
Dept: INFUSION THERAPY | Facility: OTHER | Age: 58
End: 2018-04-05

## 2018-04-05 ENCOUNTER — INFUSION THERAPY VISIT (OUTPATIENT)
Dept: INFUSION THERAPY | Facility: OTHER | Age: 58
End: 2018-04-05
Attending: FAMILY MEDICINE
Payer: MEDICARE

## 2018-04-05 VITALS
BODY MASS INDEX: 24.2 KG/M2 | HEIGHT: 70 IN | SYSTOLIC BLOOD PRESSURE: 117 MMHG | RESPIRATION RATE: 18 BRPM | WEIGHT: 169 LBS | DIASTOLIC BLOOD PRESSURE: 72 MMHG | OXYGEN SATURATION: 90 % | TEMPERATURE: 97.5 F | HEART RATE: 92 BPM

## 2018-04-05 DIAGNOSIS — J15.9 COMMUNITY ACQUIRED BACTERIAL PNEUMONIA: Primary | ICD-10-CM

## 2018-04-05 PROCEDURE — 96365 THER/PROPH/DIAG IV INF INIT: CPT

## 2018-04-05 PROCEDURE — 25000128 H RX IP 250 OP 636: Performed by: FAMILY MEDICINE

## 2018-04-05 RX ORDER — CEFTRIAXONE SODIUM 2 G/50ML
2 INJECTION, SOLUTION INTRAVENOUS ONCE
Status: COMPLETED | OUTPATIENT
Start: 2018-04-05 | End: 2018-04-05

## 2018-04-05 RX ORDER — CEFTRIAXONE SODIUM 2 G/50ML
2 INJECTION, SOLUTION INTRAVENOUS ONCE
Status: CANCELLED | OUTPATIENT
Start: 2018-04-05 | End: 2018-04-05

## 2018-04-05 RX ADMIN — CEFTRIAXONE SODIUM 2 G: 2 INJECTION, SOLUTION INTRAVENOUS at 09:08

## 2018-04-05 NOTE — PROGRESS NOTES
Patient tolerated infusion well, no signs or symptoms of adverse reaction noted.  Patient denies pain nor discomfort.     IV removed, catheter intact.  Site clean, dry and intact.  No signs or symptoms of infiltration or infection.  Covered with a sterile bandage, slight pressure applied for 30 seconds.  Pt instructed to leave bandage intact for a minimum of one hour, and to call with questions or concerns.  Copy of appointments, discharge instructions, and after visit summary (AVS) provided to patient.  Patient states understanding, discharged ambulatory.  Ginny Peterson RN

## 2018-04-05 NOTE — PROGRESS NOTES
0959: IV pump verified with rocephin 2g dose, drug, and rate of administration.  Infusion administered per protocol.

## 2018-04-05 NOTE — MR AVS SNAPSHOT
After Visit Summary   4/5/2018    Ramo Berry    MRN: [id number]           Patient Information     Date Of Birth          1960        Visit Information        Provider Department      4/5/2018 9:00 AM HC INF RM 3310 [facility] [place]        Today's Diagnoses     Community acquired bacterial pneumonia    -  1      Care Instructions    See you back as planned          Follow-ups after your visit        Your next 10 appointments already scheduled     Apr 06, 2018  9:30 AM CDT   (Arrive by 9:15 AM)   Office Visit with JUSTUS Santillan MD   [facility] [place] ([facility] [place] )    [address]  [place] [address]   [phone]           Bring a current list of meds and any records pertaining to this visit.  For Physicals, please bring immunization records and any forms needing to be filled out.  Please arrive 15 minutes early to complete paperwork and register.            Apr 06, 2018 10:00 AM CDT   Level 2 with HC INF RM 3308   [facility] [place] ([facility] [place] )    [address]  [place] [address]   [phone]            Apr 07, 2018  9:00 AM CDT   PROCEDURE with HI INFUSION    [facility] ([facility] )    [address]  [place] [address]   [phone]            Apr 08, 2018  9:00 AM CDT   PROCEDURE with HI INFUSION RM   [facility] ([facility] )    [address]  [place] [address]   [phone]            Apr 09, 2018  8:30 AM CDT   Level 2 with HC INF RM 3308   [facility] [place] ([facility] [place] )    [address]  [place] [address]   [phone]            Apr 10, 2018  9:00 AM CDT   Level 2 with HC INF RM 3310   [facility] [place] ([facility] [place] )    [address]  [place] [address]   [phone]            Apr 11, 2018  9:00 AM CDT   Level 2 with HC INF RM 3312   [facility] [place] ([facility] [place] )  "   [address]  [place] [address]   [phone]            Apr 12, 2018  8:30 AM CDT   Level 2 with HC INF RM 3302   [facility] [place] ([facility] [place] )    [address]  [place] [address]   [phone]            Apr 13, 2018 10:30 AM CDT   Level 2 with HC INF RM 3310   [facility] [place] ([facility] [place] )    [address]  [place] [address]   [phone]            May 14, 2018  9:45 AM CDT   (Arrive by 9:30 AM)   Office Visit with JUSTUS Santillan MD   [facility] [place] ([facility] [place] )    [address]  [place] [address]   [phone]           Bring a current list of meds and any records pertaining to this visit.  For Physicals, please bring immunization records and any forms needing to be filled out.  Please arrive 15 minutes early to complete paperwork and register.              Who to contact     If you have questions or need follow up information about today's clinic visit or your schedule please contact [facility] directly at [phone].  Normal or non-critical lab and imaging results will be communicated to you by MyChart, letter or phone within 4 business days after the clinic has received the results. If you do not hear from us within 7 days, please contact the clinic through [employer] or phone. If you have a critical or abnormal lab result, we will notify you by phone as soon as possible.  Submit refill requests through [employer] or call your pharmacy and they will forward the refill request to us. Please allow 3 business days for your refill to be completed.          Additional Information About Your Visit        SocialBrohardondeEstaâ„¢ Information     [employer]ing lets you send messages to your doctor, view your test results, renew your prescriptions, schedule appointments and more. To sign up, go to [email or web address] . Click on \"Log in\" on the left side of the screen, which will take you to the Welcome page. Then " "click on \"Sign up Now\" on the right side of the page.     You will be asked to enter the access code listed below, as well as some personal information. Please follow the directions to create your username and password.     Your access code is: [email or web address]  Expires: 2018 11:03 AM     Your access code will  in 90 days. If you need help or a new code, please call your [facility] clinic or [phone].        Care EveryWhere ID     This is your Care EveryWhere ID. This could be used by other organizations to access your [facility] medical records  [id number]        Your Vitals Were     Pulse Temperature Respirations Height Pulse Oximetry BMI (Body Mass Index)    92 97.5  F (36.4  C) (Oral) 18 1.778 m (5' 10\") 90% 24.25 kg/m2       Blood Pressure from Last 3 Encounters:   18 117/72   18 130/92   18 128/86    Weight from Last 3 Encounters:   18 76.7 kg (169 lb)   18 77 kg (169 lb 12.8 oz)   18 79.4 kg (175 lb)              Today, you had the following     No orders found for display       Primary Care Provider Office Phone # Fax #    R Nayan Santillan [phone] [address] [address]        Equal Access to Services     [facility]: Hadii jolene carrillo hadasho Somaria elena, waaxda luqadaha, qaybta kaalmada coleyada, arin pate . So [facility] [phone].    ATENCIÓN: Si habla español, tiene a blackwell disposición servicios gratuitos de asistencia lingüística. Lizz al [phone].    We comply with applicable federal civil rights laws and [place] laws. We do not discriminate on the basis of race, color, national origin, age, disability, sex, sexual orientation, or gender identity.            Thank you!     Thank you for choosing [facility]  for your care. Our goal is always to provide you with excellent care. Hearing back from our patients is one way we can continue to improve our services. Please take a few " minutes to complete the written survey that you may receive in the mail after your visit with us. Thank you!             Your Updated Medication List - Protect others around you: Learn how to safely use, store and throw away your medicines at www.disposemymeds.org.          This list is accurate as of 4/5/18  2:05 PM.  Always use your most recent med list.                   Brand Name Dispense Instructions for use Diagnosis    albuterol (2.5 MG/3ML) 0.083% neb solution     1 Box    Take 1 vial (2.5 mg) by nebulization every 4 hours as needed for shortness of breath / dyspnea        amLODIPine 10 MG tablet    NORVASC    90 tablet    Take 1 tablet (10 mg) by mouth daily    Benign essential hypertension       aspirin 325 MG tablet      Take 1 tablet by mouth every 4 hours as needed. For pain        hydrochlorothiazide 12.5 MG Tabs tablet     90 tablet    TAKE ONE TABLET BY MOUTH ONCE DAILY    Essential hypertension, benign       lisinopril 40 MG tablet    PRINIVIL/ZESTRIL    30 tablet    TAKE ONE TABLET BY MOUTH ONCE DAILY    Benign essential hypertension       metoprolol tartrate 25 MG tablet    LOPRESSOR    120 tablet    Take 2 tablets (50 mg) by mouth 2 times daily    Benign essential hypertension       omeprazole 20 MG CR capsule    priLOSEC    120 capsule    Take 2 capsules (40 mg) by mouth daily    Gastroesophageal reflux disease without esophagitis       ROCEPHIN IV      Inject 2,000 mLs into the vein daily

## 2018-04-05 NOTE — TELEPHONE ENCOUNTER
Staff will pull picc line per Dr. Nayan Santillan. And patient will be schedule for replacement Monday. They will do IV start through the weekend  CATRACHITO WHARTON

## 2018-04-05 NOTE — PROGRESS NOTES
Patient is a 59 y/o  here accompanied by wife today for infusion of Rocephin per order of Dr. Myradna Hayden Co-signed by Dr. Brock.  Patient meets parameters for today's infusion. Patient identified with two identifiers, order verified, and verbal consent for today's infusion obtained from patient.  PICC line will not flush, attempted position change and dressing/cap change.      24 gauge angio cath inserted into left antecubital by Barbra Groves RN after 2 attempts by this writter.  Immediate blood return noted.  IV secured with sterile, transparent dressing and tape.  Patient tolerated well, denies pain or discomfort at this time.  Flushes easily without resistance, no signs or symptoms of infiltration or infection.   Patient denies questions or concerns regarding infusion and/or medication(s) being administered.

## 2018-04-05 NOTE — TELEPHONE ENCOUNTER
Spoke with Dr. JORDIN Santillan Nurse Jacey at 1530, okay to pull patients PICC tomorrow. Next opening to have PICC replaced will not be until Monday. Patient will have rocephin administered via PIV  until PICC replaced.

## 2018-04-05 NOTE — TELEPHONE ENCOUNTER
Patients PICC line would not flush, even after position change dressing and cap change. Rocephin administered via PIV. He does not want to keep the PICC in and would like to continue infusions via an IV. He was a difficult IV start and needs daily IV therapy at the minimum til 4-13-18 when he meets with infectious MD.  Please advise on replacement of PICC.

## 2018-04-06 ENCOUNTER — TELEPHONE (OUTPATIENT)
Dept: INTERVENTIONAL RADIOLOGY/VASCULAR | Facility: HOSPITAL | Age: 58
End: 2018-04-06

## 2018-04-06 ENCOUNTER — OFFICE VISIT (OUTPATIENT)
Dept: FAMILY MEDICINE | Facility: OTHER | Age: 58
End: 2018-04-06
Attending: FAMILY MEDICINE
Payer: MEDICARE

## 2018-04-06 ENCOUNTER — INFUSION THERAPY VISIT (OUTPATIENT)
Dept: INFUSION THERAPY | Facility: OTHER | Age: 58
End: 2018-04-06
Attending: FAMILY MEDICINE
Payer: MEDICARE

## 2018-04-06 VITALS
BODY MASS INDEX: 23.62 KG/M2 | WEIGHT: 165 LBS | SYSTOLIC BLOOD PRESSURE: 118 MMHG | HEART RATE: 97 BPM | RESPIRATION RATE: 16 BRPM | OXYGEN SATURATION: 94 % | DIASTOLIC BLOOD PRESSURE: 82 MMHG | HEIGHT: 70 IN | TEMPERATURE: 99.2 F

## 2018-04-06 VITALS
SYSTOLIC BLOOD PRESSURE: 117 MMHG | WEIGHT: 165.8 LBS | BODY MASS INDEX: 23.74 KG/M2 | DIASTOLIC BLOOD PRESSURE: 78 MMHG | OXYGEN SATURATION: 92 % | RESPIRATION RATE: 16 BRPM | HEART RATE: 92 BPM | HEIGHT: 70 IN | TEMPERATURE: 98.1 F

## 2018-04-06 DIAGNOSIS — Z98.890 HISTORY OF LUNG SURGERY: ICD-10-CM

## 2018-04-06 DIAGNOSIS — Z72.0 TOBACCO ABUSE: Primary | ICD-10-CM

## 2018-04-06 DIAGNOSIS — J15.9 COMMUNITY ACQUIRED BACTERIAL PNEUMONIA: Primary | ICD-10-CM

## 2018-04-06 PROCEDURE — G0463 HOSPITAL OUTPT CLINIC VISIT: HCPCS | Mod: 25

## 2018-04-06 PROCEDURE — 25000128 H RX IP 250 OP 636: Performed by: FAMILY MEDICINE

## 2018-04-06 PROCEDURE — G0463 HOSPITAL OUTPT CLINIC VISIT: HCPCS

## 2018-04-06 PROCEDURE — 96365 THER/PROPH/DIAG IV INF INIT: CPT

## 2018-04-06 PROCEDURE — 99214 OFFICE O/P EST MOD 30 MIN: CPT | Performed by: FAMILY MEDICINE

## 2018-04-06 RX ORDER — NICOTINE 21 MG/24HR
1 PATCH, TRANSDERMAL 24 HOURS TRANSDERMAL EVERY 24 HOURS
Qty: 30 PATCH | Refills: 1 | Status: SHIPPED | OUTPATIENT
Start: 2018-04-06 | End: 2018-08-13

## 2018-04-06 RX ORDER — CEFTRIAXONE SODIUM 2 G/50ML
2 INJECTION, SOLUTION INTRAVENOUS ONCE
Status: CANCELLED | OUTPATIENT
Start: 2018-04-06 | End: 2018-04-06

## 2018-04-06 RX ORDER — LIDOCAINE HYDROCHLORIDE 10 MG/ML
10 INJECTION, SOLUTION EPIDURAL; INFILTRATION; INTRACAUDAL; PERINEURAL ONCE
Status: CANCELLED | OUTPATIENT
Start: 2018-04-09

## 2018-04-06 RX ORDER — HYDROCODONE BITARTRATE AND ACETAMINOPHEN 5; 325 MG/1; MG/1
1 TABLET ORAL EVERY 6 HOURS PRN
Qty: 20 TABLET | Refills: 0 | Status: SHIPPED | OUTPATIENT
Start: 2018-04-06 | End: 2018-04-10

## 2018-04-06 RX ORDER — CEFTRIAXONE SODIUM 2 G/50ML
2 INJECTION, SOLUTION INTRAVENOUS ONCE
Status: COMPLETED | OUTPATIENT
Start: 2018-04-06 | End: 2018-04-06

## 2018-04-06 RX ADMIN — CEFTRIAXONE SODIUM 2 G: 2 INJECTION, SOLUTION INTRAVENOUS at 10:37

## 2018-04-06 ASSESSMENT — ANXIETY QUESTIONNAIRES
6. BECOMING EASILY ANNOYED OR IRRITABLE: NOT AT ALL
7. FEELING AFRAID AS IF SOMETHING AWFUL MIGHT HAPPEN: NOT AT ALL
2. NOT BEING ABLE TO STOP OR CONTROL WORRYING: SEVERAL DAYS
IF YOU CHECKED OFF ANY PROBLEMS ON THIS QUESTIONNAIRE, HOW DIFFICULT HAVE THESE PROBLEMS MADE IT FOR YOU TO DO YOUR WORK, TAKE CARE OF THINGS AT HOME, OR GET ALONG WITH OTHER PEOPLE: SOMEWHAT DIFFICULT
5. BEING SO RESTLESS THAT IT IS HARD TO SIT STILL: NOT AT ALL
GAD7 TOTAL SCORE: 3
3. WORRYING TOO MUCH ABOUT DIFFERENT THINGS: NOT AT ALL
1. FEELING NERVOUS, ANXIOUS, OR ON EDGE: SEVERAL DAYS

## 2018-04-06 ASSESSMENT — PAIN SCALES - GENERAL: PAINLEVEL: SEVERE PAIN (6)

## 2018-04-06 ASSESSMENT — PATIENT HEALTH QUESTIONNAIRE - PHQ9: 5. POOR APPETITE OR OVEREATING: SEVERAL DAYS

## 2018-04-06 NOTE — MR AVS SNAPSHOT
After Visit Summary   2018    Ramo Berry    MRN: [id number]           Patient Information     Date Of Birth          1960        Visit Information        Provider Department      2018 10:00 AM  INF  3308 [facility]        Today's Diagnoses     Community acquired bacterial pneumonia    -  1      Care Instructions    Ceftriaxone Sodium Solution for injection  What is this medicine?  CEFTRIAXONE (sef try AX one) is a cephalosporin antibiotic. It is used to treat certain kinds of bacterial infections. It will not work for colds, flu, or other viral infections.  This medicine may be used for other purposes; ask your health care provider or pharmacist if you have questions.  What should I tell my health care provider before I take this medicine?  They need to know if you have any of these conditions:    any chronic illness    bowel disease, like colitis    both kidney and liver disease    high bilirubin level in  patients    an unusual or allergic reaction to ceftriaxone, other cephalosporin or penicillin antibiotics, foods, dyes, or preservatives    pregnant or trying to get pregnant    breast-feeding  How should I use this medicine?  This medicine is injected into a muscle or infused it into a vein. It is usually given in a medical office or clinic. If you are to give this medicine you will be taught how to inject it. Follow instructions carefully. Use your doses at regular intervals. Do not take your medicine more often than directed. Do not skip doses or stop your medicine early even if you feel better. Do not stop taking except on your doctor's advice.  Talk to your pediatrician regarding the use of this medicine in children. Special care may be needed.  Overdosage: If you think you have taken too much of this medicine contact a poison control center or emergency room at once.  NOTE: This medicine is only for you. Do not share this medicine with others.  What if  I miss a dose?  If you miss a dose, take it as soon as you can. If it is almost time for your next dose, take only that dose. Do not take double or extra doses.  What may interact with this medicine?  Do not take this medicine with any of the following medications:    intravenous calcium  This medicine may also interact with the following medications:    birth control pills  This list may not describe all possible interactions. Give your health care provider a list of all the medicines, herbs, non-prescription drugs, or dietary supplements you use. Also tell them if you smoke, drink alcohol, or use illegal drugs. Some items may interact with your medicine.  What should I watch for while using this medicine?  Tell your doctor or health care professional if your symptoms do not improve or if they get worse.  Do not treat diarrhea with over the counter products. Contact your doctor if you have diarrhea that lasts more than 2 days or if it is severe and watery.  If you are being treated for a sexually transmitted disease, avoid sexual contact until you have finished your treatment. Having sex can infect your sexual partner.  Calcium may bind to this medicine and cause lung or kidney problems. Avoid calcium products while taking this medicine and for 48 hours after taking the last dose of this medicine.  What side effects may I notice from receiving this medicine?  Side effects that you should report to your doctor or health care professional as soon as possible:    allergic reactions like skin rash, itching or hives, swelling of the face, lips, or tongue    breathing problems    fever, chills    irregular heartbeat    pain when passing urine    seizures    stomach pain, cramps    unusual bleeding, bruising    unusually weak or tired  Side effects that usually do not require medical attention (report to your doctor or health care professional if they continue or are bothersome):    diarrhea    dizzy,  drowsy    headache    nausea, vomiting    pain, swelling, irritation where injected    stomach upset    sweating  This list may not describe all possible side effects. Call your doctor for medical advice about side effects. You may report side effects to FDA at 3-915-YMD-0203.  NOTE:This sheet is a summary. It may not cover all possible information. If you have questions about this medicine, talk to your doctor, pharmacist, or health care provider. Copyright  2016 Gold Standard                Follow-ups after your visit        Your next 10 appointments already scheduled     Apr 07, 2018  9:00 AM CDT   PROCEDURE with HI INFUSION RM   [facility] ([facility] )    [address] [place] [address]   [phone]            Apr 08, 2018  9:00 AM CDT   PROCEDURE with HI INFUSION RM   [facility] ([facility] )    [address] [address]   [phone]            Apr 09, 2018  8:30 AM CDT   Level 2 with HC INF  3308   [facility] ([facility] )    [address]  [facility] [address]   [phone]            Apr 09, 2018 12:30 PM CDT   (Arrive by 12:15 PM)   IR PICC PLACEMENT > 5 YRS OF AGE LEFT with HIIR1, HIXRRN, HIIRRAD   HI INTERVENTIONAL RAD ([facility] )    [address] [id number] [phone]           1. You will need to have had a history and physical exam within 7 days of the procedure. 2. Laboratory test are to be obtained by your doctor prior to the exam (CBCP, INR and PTT) 3. Someone will need to drive you to and from the hospital. 4. If you are or may be pregnant, contact your doctor or a Radiology nurse prior to the day of the exam. 5. If you have diabetes, check with your doctor or a Radiology nurse to see if your insulin needs to be adjusted for the exam. 6. If you are taking Coumadin (to thin you blood) please contact your doctor or a Radiology nurse at least 3 days before the exam  for special instructions. 7. The day before your exam you may eat your regular diet and are encouraged to drink at least 2 quarts of clear liquids. Drink no alcoholic beverages for 24 hours prior to the exam. 8. Do not eat any solid food or milk products for 6 hours prior to the exam. You may drink clear liquids until 2 hours prior to the exam. Clear liquids include the following: water, Jell-O, clear broth, apple juice or any noncarbonated drink that you can see through (no pop!) 9. The morning of the exam you may brush your teeth and take medications as directed with a sip of water. 10. Tell the Radiology nurse if you have any allergies.            Apr 10, 2018  9:00 AM CDT   Level 2 with HC INF RM 3310   [facility] [place] ([facility] [place] )    [address]  [place] [address]   [phone]            Apr 11, 2018  9:00 AM CDT   Level 2 with HC INF RM 3312   [facility] [place] ([facility] [place] )    [address]  [place] [address]   [phone]            Apr 12, 2018  8:30 AM CDT   Level 2 with HC INF RM 3302   [facility] [place] ([facility] [place] )    [address]  [place] [address]   [phone]            Apr 13, 2018 10:30 AM CDT   Level 2 with HC INF RM 3310   [facility] [place] ([facility] [place] )    [address]  [place] [address]   [phone]              Future tests that were ordered for you today     Open Future Orders        Priority Expected Expires Ordered    IR PICC Placement > 5 Yrs of Age Routine  4/5/2019 4/5/2018            Who to contact     If you have questions or need follow up information about today's clinic visit or your schedule please contact [facility] directly at [phone].  Normal or non-critical lab and imaging results will be communicated to you by MyChart, letter or phone within 4 business days after the clinic has received the results. If you do not hear  "from us within 7 days, please contact the clinic through [facility] or phone. If you have a critical or abnormal lab result, we will notify you by phone as soon as possible.  Submit refill requests through [employer] or call your pharmacy and they will forward the refill request to us. Please allow 3 business days for your refill to be completed.          Additional Information About Your Visit        KinetaharClipyoo Information     [employer] lets you send messages to your doctor, view your test results, renew your prescriptions, schedule appointments and more. To sign up, go to [email or web address] . Click on \"Log in\" on the left side of the screen, which will take you to the Welcome page. Then click on \"Sign up Now\" on the right side of the page.     You will be asked to enter the access code listed below, as well as some personal information. Please follow the directions to create your username and password.     Your access code is: [email or web address]  Expires: 2018 11:03 AM     Your access code will  in 90 days. If you need help or a new code, please call your [facility] clinic or [phone].        Care EveryWhere ID     This is your Care EveryWhere ID. This could be used by other organizations to access your [facility] medical records  [id number]        Your Vitals Were     Pulse Temperature Respirations Height Pulse Oximetry BMI (Body Mass Index)    92 98.1  F (36.7  C) (Oral) 16 1.778 m (5' 10\") 92% 23.79 kg/m2       Blood Pressure from Last 3 Encounters:   18 117/78   18 118/82   18 117/72    Weight from Last 3 Encounters:   18 75.2 kg (165 lb 12.8 oz)   18 74.8 kg (165 lb)   18 76.7 kg (169 lb)              We Performed the Following     PICC removal          Today's Medication Changes          These changes are accurate as of 18 11:18 AM.  If you have any questions, ask your nurse or doctor.               Start taking these medicines.        Dose/Directions    " HYDROcodone-acetaminophen 5-325 MG per tablet   Commonly known as:  NORCO   Used for:  History of lung surgery   Started by:  JUSTUS Santillan MD        Dose:  1 tablet   Take 1 tablet by mouth every 6 hours as needed for severe pain maximum 6 tablet(s) per day   Quantity:  20 tablet   Refills:  0       nicotine 14 MG/24HR 24 hr patch   Commonly known as:  NICODERM CQ   Used for:  Tobacco abuse   Started by:  JUSTUS Santillan MD        Dose:  1 patch   Place 1 patch onto the skin every 24 hours   Quantity:  30 patch   Refills:  1            Where to get your medicines      These medications were sent to [facility] [address] [address]   [address] , [facility] [address]     Phone:  [phone]     nicotine 14 MG/24HR 24 hr patch         Some of these will need a paper prescription and others can be bought over the counter.  Ask your nurse if you have questions.     Bring a paper prescription for each of these medications     HYDROcodone-acetaminophen 5-325 MG per tablet                Primary Care Provider Office Phone # Fax #    JUSTUS Santillan [phone]       [facility] [address]        Equal Access to Services     [facility]: Hadii aad ku hadasho Soomaali, waaxda luqadaha, qaybta kaalmada adeegyada, arin rebolledo hayann pate . So [facility] [place] [phone].    ATENCIÓN: Si habla español, tiene a blackwell disposición servicios gratuitos de asistencia lingüística. Llame al [phone].    We comply with applicable federal civil rights laws and [place] laws. We do not discriminate on the basis of race, color, national origin, age, disability, sex, sexual orientation, or gender identity.            Thank you!     Thank you for choosing [facility]  for your care. Our goal is always to provide you with excellent care. Hearing back from our patients is one way we can continue to improve our services. Please take a few minutes to complete the  written survey that you may receive in the mail after your visit with us. Thank you!             Your Updated Medication List - Protect others around you: Learn how to safely use, store and throw away your medicines at www.disposemymeds.org.          This list is accurate as of 4/6/18 11:18 AM.  Always use your most recent med list.                   Brand Name Dispense Instructions for use Diagnosis    albuterol (2.5 MG/3ML) 0.083% neb solution     1 Box    Take 1 vial (2.5 mg) by nebulization every 4 hours as needed for shortness of breath / dyspnea        amLODIPine 10 MG tablet    NORVASC    90 tablet    Take 1 tablet (10 mg) by mouth daily    Benign essential hypertension       aspirin 325 MG tablet      Take 1 tablet by mouth every 4 hours as needed. For pain        hydrochlorothiazide 12.5 MG Tabs tablet     90 tablet    TAKE ONE TABLET BY MOUTH ONCE DAILY    Essential hypertension, benign       HYDROcodone-acetaminophen 5-325 MG per tablet    NORCO    20 tablet    Take 1 tablet by mouth every 6 hours as needed for severe pain maximum 6 tablet(s) per day    History of lung surgery       lisinopril 40 MG tablet    PRINIVIL/ZESTRIL    30 tablet    TAKE ONE TABLET BY MOUTH ONCE DAILY    Benign essential hypertension       metoprolol tartrate 25 MG tablet    LOPRESSOR    120 tablet    Take 2 tablets (50 mg) by mouth 2 times daily    Benign essential hypertension       nicotine 14 MG/24HR 24 hr patch    NICODERM CQ    30 patch    Place 1 patch onto the skin every 24 hours    Tobacco abuse       omeprazole 20 MG CR capsule    priLOSEC    120 capsule    Take 2 capsules (40 mg) by mouth daily    Gastroesophageal reflux disease without esophagitis       ROCEPHIN IV      Inject 2,000 mLs into the vein daily

## 2018-04-06 NOTE — PROGRESS NOTES
Patient is a 58 year old male here accompanied by wife today for infusion of Rocephin per order of Dr. Nayan Santillan. Patient meets parameters for today's infusion. Patient identified with two identifiers, order verified, and verbal consent for today's infusion obtained from patient.      1034: 22 gauge angio cath inserted into left lower forearm.  Immediate blood return noted.  IV secured with sterile, transparent dressing and tape.  Patient tolerated well, denies pain or discomfort at this time.  Flushes easily without resistance, no signs or symptoms of infiltration or infection.   Patient denies questions or concerns regarding infusion and/or medication(s) being administered.    1037: IV pump verified with Rocephin 2 gram dose, drug, and rate of administration with MAR and medication label.  Infusion administered per protocol.    1041: PICC line in right upper arm removed per order of Dr. Santillan, as PICC line is no longer functional. Removed using aseptic technique and wrapped with sterile bandage. Patient tolerated well. Patient to have new PICC line placed on 4-9-18.    Patient tolerated infusion well, no signs or symptoms of adverse reaction noted.  Patient denies pain nor discomfort.     IV removed, catheter intact.  Site clean, dry and intact.  No signs or symptoms of infiltration or infection.  Covered with a sterile bandage, slight pressure applied for 30 seconds.  Pt instructed to leave bandage intact for a minimum of one hour, and to call with questions or concerns.  Copy of appointments, discharge instructions, and after visit summary (AVS) provided to patient.  Patient states understanding, discharged ambulatory.  Ginny Peterson RN

## 2018-04-06 NOTE — PATIENT INSTRUCTIONS
Ceftriaxone Sodium Solution for injection  What is this medicine?  CEFTRIAXONE (sef try AX one) is a cephalosporin antibiotic. It is used to treat certain kinds of bacterial infections. It will not work for colds, flu, or other viral infections.  This medicine may be used for other purposes; ask your health care provider or pharmacist if you have questions.  What should I tell my health care provider before I take this medicine?  They need to know if you have any of these conditions:    any chronic illness    bowel disease, like colitis    both kidney and liver disease    high bilirubin level in  patients    an unusual or allergic reaction to ceftriaxone, other cephalosporin or penicillin antibiotics, foods, dyes, or preservatives    pregnant or trying to get pregnant    breast-feeding  How should I use this medicine?  This medicine is injected into a muscle or infused it into a vein. It is usually given in a medical office or clinic. If you are to give this medicine you will be taught how to inject it. Follow instructions carefully. Use your doses at regular intervals. Do not take your medicine more often than directed. Do not skip doses or stop your medicine early even if you feel better. Do not stop taking except on your doctor's advice.  Talk to your pediatrician regarding the use of this medicine in children. Special care may be needed.  Overdosage: If you think you have taken too much of this medicine contact a poison control center or emergency room at once.  NOTE: This medicine is only for you. Do not share this medicine with others.  What if I miss a dose?  If you miss a dose, take it as soon as you can. If it is almost time for your next dose, take only that dose. Do not take double or extra doses.  What may interact with this medicine?  Do not take this medicine with any of the following medications:    intravenous calcium  This medicine may also interact with the following medications:    birth  control pills  This list may not describe all possible interactions. Give your health care provider a list of all the medicines, herbs, non-prescription drugs, or dietary supplements you use. Also tell them if you smoke, drink alcohol, or use illegal drugs. Some items may interact with your medicine.  What should I watch for while using this medicine?  Tell your doctor or health care professional if your symptoms do not improve or if they get worse.  Do not treat diarrhea with over the counter products. Contact your doctor if you have diarrhea that lasts more than 2 days or if it is severe and watery.  If you are being treated for a sexually transmitted disease, avoid sexual contact until you have finished your treatment. Having sex can infect your sexual partner.  Calcium may bind to this medicine and cause lung or kidney problems. Avoid calcium products while taking this medicine and for 48 hours after taking the last dose of this medicine.  What side effects may I notice from receiving this medicine?  Side effects that you should report to your doctor or health care professional as soon as possible:    allergic reactions like skin rash, itching or hives, swelling of the face, lips, or tongue    breathing problems    fever, chills    irregular heartbeat    pain when passing urine    seizures    stomach pain, cramps    unusual bleeding, bruising    unusually weak or tired  Side effects that usually do not require medical attention (report to your doctor or health care professional if they continue or are bothersome):    diarrhea    dizzy, drowsy    headache    nausea, vomiting    pain, swelling, irritation where injected    stomach upset    sweating  This list may not describe all possible side effects. Call your doctor for medical advice about side effects. You may report side effects to FDA at 8-587-NHS-0255.  NOTE:This sheet is a summary. It may not cover all possible information. If you have questions about  this medicine, talk to your doctor, pharmacist, or health care provider. Copyright  2016 Gold Standard

## 2018-04-06 NOTE — PROGRESS NOTES
SUBJECTIVE:   Ramo Berry is a 58 year old male who presents to clinic today for the following health issues:          Hospital Follow-up Visit:    Hospital/Nursing Home/IP Rehab Facility: [facility]  Date of Admission: 03/17/2018  Date of Discharge: 03/26/2018  Reason(s) for Admission: Brought by ambulance for pneumonia and empyema of the right lung. Had right lung surgery on 03/17/2018.           Problems taking medications regularly:  None       Medication changes since discharge: None       Problems adhering to non-medication therapy:  None  Patient was hospitalized in March at [facility] with an empyema and did have surgical drainage and now is on IV antibiotics through a PICC line and followed by infectious disease.  He has a follow-up appointment on April 13.  Unfortunately his PICC line plugged so we will need to get that replaced    Summary of hospitalization:   hospital discharge summary reviewed    Diagnostic Tests/Treatments reviewed.  Follow up needed: see above  Other Healthcare Providers Involved in Patient s Care:           Update since discharge: improved.     Post Discharge Medication Reconciliation: discharge medications reconciled, continue medications without change.  Plan of care communicated with patient     Coding guidelines for this visit:  Type of Medical   Decision Making Face-to-Face Visit       within 7 Days of discharge Face-to-Face Visit        within 14 days of discharge   Moderate Complexity [id number]   High Complexity [id number]             [facility]    Ramo Berry, 58 year old, male presents with   Chief Complaint   Patient presents with     Hospital F/U     Had surgery on right lung due to infection.       PAST MEDICAL HISTORY:  Past Medical History:   Diagnosis Date     Anxiety state, unspecified 09/20/2012     Back Pain 09/20/2012     Cervical disc disease 09/20/2012     Chronic pain syndrome 09/20/2012     Community acquired bacterial  pneumonia 3/27/2018     Essential hypertension, benign 09/20/2012     Loculated pleural effusion 03/17/2018    right       PAST SURGICAL HISTORY:  Past Surgical History:   Procedure Laterality Date     ARTHROSCOPY SHOULDER, OPEN BICEP TENODESIS REPAIR, COMBINED Right 4/21/2017    Procedure: COMBINED ARTHROSCOPY SHOULDER, OPEN BICEP TENODESIS REPAIR;;  Surgeon: Talha Farrell DO;  Location: [facility]     ARTHROSCOPY SHOULDER, OPEN ROTATOR CUFF REPAIR, COMBINED Right 4/21/2017    Procedure: COMBINED ARTHROSCOPY SHOULDER, OPEN ROTATOR CUFF REPAIR;  RIGHT SHOULDER ARTHROSCOPY WITH Open  REPAIR OF ROTATOR CUFFand BICEP TENDODESIS;  Surgeon: Talha Farrell DO;  Location: [facility] OR     C6 C7 cervical fusion       cataract extraction and lens implantation      Bilateral      cystoscopy with biopsies      hematuria     fusion L5 S1       L4 L5 spinal fusion       LUNG SURGERY Right 03/17/2018    Surgery at [facility] to remove infection. Started with pneumonia got worse.     MOUTH SURGERY       ORTHOPEDIC SURGERY Right 08/09/2016    rotator cuff surgery      THORACOTOMY  03/17/2018    right video assisted thoracoscopic surgery       MEDICATIONS:  Prior to Admission medications    Medication Sig Start Date End Date Taking? Authorizing Provider   nicotine (NICODERM CQ) 14 MG/24HR 24 hr patch Place 1 patch onto the skin every 24 hours 4/6/18  Yes JUSTUS Santillan MD   HYDROcodone-acetaminophen (NORCO) 5-325 MG per tablet Take 1 tablet by mouth every 6 hours as needed for severe pain maximum 6 tablet(s) per day 4/6/18  Yes JUSTUS Santillan MD   CefTRIAXone Sodium (ROCEPHIN IV) Inject 2,000 mLs into the vein daily   Yes Reported, Patient   albuterol (2.5 MG/3ML) 0.083% neb solution Take 1 vial (2.5 mg) by nebulization every 4 hours as needed for shortness of breath / dyspnea 3/27/18 4/26/18 Yes Bibi German MD   lisinopril (PRINIVIL/ZESTRIL) 40 MG tablet TAKE ONE TABLET BY MOUTH ONCE DAILY 2/23/18  Yes JUSTUS Santillan  "MD   hydrochlorothiazide 12.5 MG TABS tablet TAKE ONE TABLET BY MOUTH ONCE DAILY 12/8/17  Yes JUSTUS Santillan MD   amLODIPine (NORVASC) 10 MG tablet Take 1 tablet (10 mg) by mouth daily 11/7/17  Yes JUSTUS Santillan MD   omeprazole (PRILOSEC) 20 MG CR capsule Take 2 capsules (40 mg) by mouth daily 6/1/17  Yes JUSTUS Santillan MD   metoprolol (LOPRESSOR) 25 MG tablet Take 2 tablets (50 mg) by mouth 2 times daily 6/1/17  Yes JUSTUS Santillan MD   aspirin 325 MG tablet Take 1 tablet by mouth every 4 hours as needed. For pain   Yes Reported, Patient       ALLERGIES:   No Known Allergies    ROS:  Constitutional, neuro, ENT, endocrine, pulmonary, cardiac, gastrointestinal, genitourinary, musculoskeletal, integument and psychiatric systems are negative, except as otherwise noted.      EXAM:  /82 (BP Location: Left arm, Patient Position: Chair, Cuff Size: Adult Regular)  Pulse 97  Temp 99.2  F (37.3  C) (Tympanic)  Resp 16  Ht 5' 10\" (1.778 m)  Wt 165 lb (74.8 kg)  SpO2 94%  BMI 23.68 kg/m2 Body mass index is 23.68 kg/(m^2).   GENERAL APPEARANCE:  alert and no distress  EYES: Eyes grossly normal to inspection, PERRL and conjunctivae and sclerae normal  NECK: no adenopathy, no asymmetry, masses, or scars and thyroid normal to palpation  RESP: Bilateral breath sounds.  Has some discomfort along the scar in the right posterior chest.  The  chest tube wounds are healing no sign of infection there  CV: regular rates and rhythm, normal S1 S2, no S3 or S4 and no murmur, click or rub  NEURO: Normal strength and tone, mentation intact and speech normal  Lab/ X-ray  No results found for this or any previous visit (from the past 24 hour(s)).    ASSESSMENT/PLAN:    ICD-10-CM    1. Tobacco abuse Z72.0 nicotine (NICODERM CQ) 14 MG/24HR 24 hr patch   2. History of lung surgery Z98.890 HYDROcodone-acetaminophen (NORCO) 5-325 MG per tablet   He is little under a pack a day smoker.  Will get him on NicoDerm patches daily.  He does " have a lot of discomfort at night.  Overall he feels like he is improving.  We will get him some Bethel's #20 and he is well aware that this is only a short-term treatment and he will not be on chronic narcotics.  He will keep his appointment with infectious disease at [facility] on April 13.  His PICC line will be pulled and will get a new PICC line.  He has IV antibiotics as ordered from his infectious disease doctor      BALJEET Santillan MD  April 6, 2018

## 2018-04-06 NOTE — MR AVS SNAPSHOT
After Visit Summary   4/6/2018    Ramo Berry    MRN: [id number]           Patient Information     Date Of Birth          1960        Visit Information        Provider Department      4/6/2018 9:30 AM JUSTUS Santillan MD [facility]        Today's Diagnoses     Tobacco abuse    -  1    History of lung surgery           Follow-ups after your visit        Your next 10 appointments already scheduled     Apr 06, 2018 10:00 AM CDT   Level 2 with HC INF RM 3308   [facility] )    [address]  [place] [address]   [phone]            Apr 07, 2018  9:00 AM CDT   PROCEDURE with HI INFUSION RM   [facility] ([facility] )    [address]  [place] [address]   [phone]            Apr 08, 2018  9:00 AM CDT   PROCEDURE with HI INFUSION RM   [facility] ([facility] )    [address]  [place] [address]   [phone]            Apr 09, 2018  8:30 AM CDT   Level 2 with HC INF RM 3308   [facility] [place] ([facility] )    [address]  [place] [address]   [phone]            Apr 09, 2018 12:30 PM CDT   (Arrive by 12:15 PM)   IR PICC PLACEMENT > 5 YRS OF AGE LEFT with HIIR1, HIXRRN, HIIRRAD   [facility] ([facility] )    [address] [place] [address]  [place] [address] [phone]           1. You will need to have had a history and physical exam within 7 days of the procedure. 2. Laboratory test are to be obtained by your doctor prior to the exam (CBCP, INR and PTT) 3. Someone will need to drive you to and from the hospital. 4. If you are or may be pregnant, contact your doctor or a Radiology nurse prior to the day of the exam. 5. If you have diabetes, check with your doctor or a Radiology nurse to see if your insulin needs to be adjusted for the exam. 6. If you are taking Coumadin (to thin you blood) please contact your doctor or a  Radiology nurse at least 3 days before the exam for special instructions. 7. The day before your exam you may eat your regular diet and are encouraged to drink at least 2 quarts of clear liquids. Drink no alcoholic beverages for 24 hours prior to the exam. 8. Do not eat any solid food or milk products for 6 hours prior to the exam. You may drink clear liquids until 2 hours prior to the exam. Clear liquids include the following: water, Jell-O, clear broth, apple juice or any noncarbonated drink that you can see through (no pop!) 9. The morning of the exam you may brush your teeth and take medications as directed with a sip of water. 10. Tell the Radiology nurse if you have any allergies.            Apr 10, 2018  9:00 AM CDT   Level 2 with HC INF RM 3310   [facility] ([facility] [place] )    [address]  [place] [address]   [phone]            Apr 11, 2018  9:00 AM CDT   Level 2 with HC INF RM 3312   [facility] [place] ([facility] [place] )    [address]  [place] [address]   [phone]            Apr 12, 2018  8:30 AM CDT   Level 2 with HC INF RM 3302   [facility] [place] ([facility] [place] )    [address]  [place] [address]   [phone]              Future tests that were ordered for you today     Open Future Orders        Priority Expected Expires Ordered    IR PICC Placement > 5 Yrs of Age Routine  4/5/2019 4/5/2018            Who to contact     If you have questions or need follow up information about today's clinic visit or your schedule please contact [facility] directly at [phone].  Normal or non-critical lab and imaging results will be communicated to you by MyChart, letter or phone within 4 business days after the clinic has received the results. If you do not hear from us within 7 days, please contact the clinic through MyChart or phone. If you have a critical or abnormal lab result, we will  "notify you by phone as soon as possible.  Submit refill requests through [employer] or call your pharmacy and they will forward the refill request to us. Please allow 3 business days for your refill to be completed.          Additional Information About Your Visit        [employer] Information     [employer] lets you send messages to your doctor, view your test results, renew your prescriptions, schedule appointments and more. To sign up, go to [email or web address] . Click on \"Log in\" on the left side of the screen, which will take you to the Welcome page. Then click on \"Sign up Now\" on the right side of the page.     You will be asked to enter the access code listed below, as well as some personal information. Please follow the directions to create your username and password.     Your access code is: [email or web address]  Expires: 2018 11:03 AM     Your access code will  in 90 days. If you need help or a new code, please call your [facility] clinic or [phone].        Care EveryWhere ID     This is your Care EveryWhere ID. This could be used by other organizations to access your [facility] medical records  [id number]        Your Vitals Were     Pulse Temperature Respirations Height Pulse Oximetry BMI (Body Mass Index)    97 99.2  F (37.3  C) (Tympanic) 16 5' 10\" (1.778 m) 94% 23.68 kg/m2       Blood Pressure from Last 3 Encounters:   18 118/82   18 117/72   18 130/92    Weight from Last 3 Encounters:   18 165 lb (74.8 kg)   18 169 lb (76.7 kg)   18 169 lb 12.8 oz (77 kg)              Today, you had the following     No orders found for display         Today's Medication Changes          These changes are accurate as of 18  9:44 AM.  If you have any questions, ask your nurse or doctor.               Start taking these medicines.        Dose/Directions    HYDROcodone-acetaminophen 5-325 MG per tablet   Commonly known as:  NORCO   Used for:  History of lung surgery   Started by:  " JUSTUS Santillan MD        Dose:  1 tablet   Take 1 tablet by mouth every 6 hours as needed for severe pain maximum 6 tablet(s) per day   Quantity:  20 tablet   Refills:  0       nicotine 14 MG/24HR 24 hr patch   Commonly known as:  NICODERM CQ   Used for:  Tobacco abuse   Started by:  JUSTUS Santillan MD        Dose:  1 patch   Place 1 patch onto the skin every 24 hours   Quantity:  30 patch   Refills:  1            Where to get your medicines      These medications were sent to [facility] [address] - [address] [address]  [address], [facility] [address]     Phone:  [phone]     nicotine 14 MG/24HR 24 hr patch         Some of these will need a paper prescription and others can be bought over the counter.  Ask your nurse if you have questions.     Bring a paper prescription for each of these medications     HYDROcodone-acetaminophen 5-325 MG per tablet                Primary Care Provider Office Phone # Fax #    JUSTUS Santillan [phone] [address] [address]        Equal Access to Services     [facility] AH: Hadii aad ku hadasho Soomaali, waaxda luqadaha, qaybta kaalmada adeegyada, waxay idiin hayaaventura pate . So [facility] [phone].    ATENCIÓN: Si habla español, tiene a blackwell disposición servicios gratuitos de asistencia lingüística. Llame al [phone].    We comply with applicable federal civil rights laws and [place] laws. We do not discriminate on the basis of race, color, national origin, age, disability, sex, sexual orientation, or gender identity.            Thank you!     Thank you for choosing [facility]  for your care. Our goal is always to provide you with excellent care. Hearing back from our patients is one way we can continue to improve our services. Please take a few minutes to complete the written survey that you may receive in the mail after your visit with us. Thank you!             Your Updated Medication List -  Protect others around you: Learn how to safely use, store and throw away your medicines at www.disposemymeds.org.          This list is accurate as of 4/6/18  9:44 AM.  Always use your most recent med list.                   Brand Name Dispense Instructions for use Diagnosis    albuterol (2.5 MG/3ML) 0.083% neb solution     1 Box    Take 1 vial (2.5 mg) by nebulization every 4 hours as needed for shortness of breath / dyspnea        amLODIPine 10 MG tablet    NORVASC    90 tablet    Take 1 tablet (10 mg) by mouth daily    Benign essential hypertension       aspirin 325 MG tablet      Take 1 tablet by mouth every 4 hours as needed. For pain        hydrochlorothiazide 12.5 MG Tabs tablet     90 tablet    TAKE ONE TABLET BY MOUTH ONCE DAILY    Essential hypertension, benign       HYDROcodone-acetaminophen 5-325 MG per tablet    NORCO    20 tablet    Take 1 tablet by mouth every 6 hours as needed for severe pain maximum 6 tablet(s) per day    History of lung surgery       lisinopril 40 MG tablet    PRINIVIL/ZESTRIL    30 tablet    TAKE ONE TABLET BY MOUTH ONCE DAILY    Benign essential hypertension       metoprolol tartrate 25 MG tablet    LOPRESSOR    120 tablet    Take 2 tablets (50 mg) by mouth 2 times daily    Benign essential hypertension       nicotine 14 MG/24HR 24 hr patch    NICODERM CQ    30 patch    Place 1 patch onto the skin every 24 hours    Tobacco abuse       omeprazole 20 MG CR capsule    priLOSEC    120 capsule    Take 2 capsules (40 mg) by mouth daily    Gastroesophageal reflux disease without esophagitis       ROCEPHIN IV      Inject 2,000 mLs into the vein daily

## 2018-04-06 NOTE — NURSING NOTE
"Chief Complaint   Patient presents with     Hospital F/U     Had surgery on right lung due to infection.       Initial /82 (BP Location: Left arm, Patient Position: Chair, Cuff Size: Adult Regular)  Pulse 97  Temp 99.2  F (37.3  C) (Tympanic)  Resp 16  Ht 5' 10\" (1.778 m)  Wt 165 lb (74.8 kg)  SpO2 94%  BMI 23.68 kg/m2 Estimated body mass index is 23.68 kg/(m^2) as calculated from the following:    Height as of this encounter: 5' 10\" (1.778 m).    Weight as of this encounter: 165 lb (74.8 kg).  Medication Reconciliation: complete   Mimi Potter LPN    "

## 2018-04-07 ENCOUNTER — HOSPITAL ENCOUNTER (OUTPATIENT)
Facility: HOSPITAL | Age: 58
Discharge: HOME | End: 2018-04-07
Attending: INTERNAL MEDICINE | Admitting: FAMILY MEDICINE
Payer: MEDICARE

## 2018-04-07 VITALS
RESPIRATION RATE: 16 BRPM | TEMPERATURE: 97.9 F | SYSTOLIC BLOOD PRESSURE: 115 MMHG | DIASTOLIC BLOOD PRESSURE: 74 MMHG | OXYGEN SATURATION: 93 %

## 2018-04-07 DIAGNOSIS — J15.9 COMMUNITY ACQUIRED BACTERIAL PNEUMONIA: ICD-10-CM

## 2018-04-07 PROCEDURE — 25000128 H RX IP 250 OP 636: Performed by: FAMILY MEDICINE

## 2018-04-07 PROCEDURE — 96365 THER/PROPH/DIAG IV INF INIT: CPT

## 2018-04-07 RX ORDER — CEFTRIAXONE SODIUM 2 G/50ML
2 INJECTION, SOLUTION INTRAVENOUS ONCE
Status: COMPLETED | OUTPATIENT
Start: 2018-04-07 | End: 2018-04-07

## 2018-04-07 RX ORDER — CEFTRIAXONE SODIUM 2 G/50ML
2 INJECTION, SOLUTION INTRAVENOUS ONCE
Status: CANCELLED | OUTPATIENT
Start: 2018-04-07 | End: 2018-04-07

## 2018-04-07 RX ADMIN — CEFTRIAXONE SODIUM 2 G: 2 INJECTION, SOLUTION INTRAVENOUS at 09:53

## 2018-04-07 ASSESSMENT — PATIENT HEALTH QUESTIONNAIRE - PHQ9: SUM OF ALL RESPONSES TO PHQ QUESTIONS 1-9: 2

## 2018-04-07 ASSESSMENT — ANXIETY QUESTIONNAIRES: GAD7 TOTAL SCORE: 3

## 2018-04-07 NOTE — IP AVS SNAPSHOT
MRN:[id number]                      After Visit Summary   4/7/2018    Ramo Berry    MRN: [id number]           Thank you!     Thank you for choosing [facility] for your care. Our goal is always to provide you with excellent care. Hearing back from our patients is one way we can continue to improve our services. Please take a few minutes to complete the written survey that you may receive in the mail after you visit with us. Thank you!        Patient Information     Date Of Birth          1960        About your hospital stay     You were admitted on:  April 7, 2018 You last received care in the:  [facility]    You were discharged on:  April 7, 2018       Who to Call     For medical emergencies, please call 911.  For non-urgent questions about your medical care, please call your primary care provider or clinic, [phone]          Attending Provider     Provider Specialty    Stephani Martinez MD Hematology & Oncology       Primary Care Provider Office Phone # Fax #    R Nayan Santillan [phone]      Your next 10 appointments already scheduled     Apr 08, 2018  9:00 AM CDT   PROCEDURE with [facility] INFUSION    [facility] ([facility] )    [address] [address]   [phone]            Apr 09, 2018  8:30 AM CDT   Level 2 with HC INF  3308   [facility] [place] ([facility] )    [address] [facility] [address]   [phone]            Apr 09, 2018 12:30 PM CDT   (Arrive by 12:15 PM)   IR PICC PLACEMENT > 5 YRS OF AGE LEFT with HIIR1, HIXRRN, HIIRRAD   [facility] INTERVENTIONAL RAD ([facility] )    [address] [id number] [phone]           1. You will need to have had a history and physical exam within 7 days of the procedure. 2. Laboratory test are to be obtained by your doctor prior to the exam (CBCP, INR and PTT) 3. Someone will need to drive you to and from the  [facility]. 4. If you are or may be pregnant, contact your doctor or a Radiology nurse prior to the day of the exam. 5. If you have diabetes, check with your doctor or a Radiology nurse to see if your insulin needs to be adjusted for the exam. 6. If you are taking Coumadin (to thin you blood) please contact your doctor or a Radiology nurse at least 3 days before the exam for special instructions. 7. The day before your exam you may eat your regular diet and are encouraged to drink at least 2 quarts of clear liquids. Drink no alcoholic beverages for 24 hours prior to the exam. 8. Do not eat any solid food or milk products for 6 hours prior to the exam. You may drink clear liquids until 2 hours prior to the exam. Clear liquids include the following: water, Jell-O, clear broth, apple juice or any noncarbonated drink that you can see through (no pop!) 9. The morning of the exam you may brush your teeth and take medications as directed with a sip of water. 10. Tell the Radiology nurse if you have any allergies.            Apr 10, 2018  9:00 AM CDT   Level 2 with HC INF RM 3310   [facility] [place] ([facility] [place] )    [address]  [place] [address]   [phone]            Apr 11, 2018  9:00 AM CDT   Level 2 with HC INF RM 3312   [facility] [place] ([facility] [place] )    [address]  [place] [address]   [phone]            Apr 12, 2018  8:30 AM CDT   Level 2 with HC INF RM 3302   [facility] [place] ([facility] [place] )    [address]  [place] [address]   [phone]            Apr 13, 2018 10:30 AM CDT   Level 2 with HC INF RM 3310   [facility] [place] ([facility] [place] )    [address]  [place] [address]   [phone]            May 14, 2018  9:45 AM CDT   (Arrive by 9:30 AM)   Office Visit with JUSTUS Santillan MD   [facility] [place] ([facility] [place] )    [address]  [place]  "[address]   [phone]           Bring a current list of meds and any records pertaining to this visit.  For Physicals, please bring immunization records and any forms needing to be filled out.  Please arrive 15 minutes early to complete paperwork and register.              Pending Results     No orders found from 2018 to 2018.            Admission Information     Date & Time Provider Department Dept. Phone    2018 Stephani Martinez MD [facility] [phone]      Your Vitals Were     Blood Pressure Temperature Respirations Pulse Oximetry          115/74 (BP Location: Left arm) 97.9  F (36.6  C) (Oral) 16 93%        MyChart Information     [employer] lets you send messages to your doctor, view your test results, renew your prescriptions, schedule appointments and more. To sign up, go to [email or web address] . Click on \"Log in\" on the left side of the screen, which will take you to the Welcome page. Then click on \"Sign up Now\" on the right side of the page.     You will be asked to enter the access code listed below, as well as some personal information. Please follow the directions to create your username and password.     Your access code is: [email or web address]  Expires: 2018 11:03 AM     Your access code will  in 90 days. If you need help or a new code, please call your [facility] clinic or [phone].        Care EveryWhere ID     This is your Care EveryWhere ID. This could be used by other organizations to access your [facility] medical records  [id number]        Equal Access to Services     [facility] ZACHARIAH : Hadii jolene carrillo hadhaileyo Somaria elena, waaxda luqadaha, qaybta kaalmada coleyaalicia, arin dueñas. So [facility] [phone].    ATENCIÓN: Si habla español, tiene a blackwell disposición servicios gratuitos de asistencia lingüística. Llame al [phone].    We comply with applicable federal civil rights laws and [place] laws. We do not discriminate on the basis of race, " color, national origin, age, disability, sex, sexual orientation, or gender identity.               Review of your medicines      UNREVIEWED medicines. Ask your doctor about these medicines        Dose / Directions    albuterol (2.5 MG/3ML) 0.083% neb solution        Dose:  1 vial   Take 1 vial (2.5 mg) by nebulization every 4 hours as needed for shortness of breath / dyspnea   Quantity:  1 Box   Refills:  0       amLODIPine 10 MG tablet   Commonly known as:  NORVASC   Used for:  Benign essential hypertension        Dose:  10 mg   Take 1 tablet (10 mg) by mouth daily   Quantity:  90 tablet   Refills:  1       aspirin 325 MG tablet        Dose:  1 tablet   Take 1 tablet by mouth every 4 hours as needed. For pain   Refills:  0       hydrochlorothiazide 12.5 MG Tabs tablet   Used for:  Essential hypertension, benign        TAKE ONE TABLET BY MOUTH ONCE DAILY   Quantity:  90 tablet   Refills:  2       HYDROcodone-acetaminophen 5-325 MG per tablet   Commonly known as:  NORCO   Used for:  History of lung surgery        Dose:  1 tablet   Take 1 tablet by mouth every 6 hours as needed for severe pain maximum 6 tablet(s) per day   Quantity:  20 tablet   Refills:  0       lisinopril 40 MG tablet   Commonly known as:  PRINIVIL/ZESTRIL   Used for:  Benign essential hypertension        TAKE ONE TABLET BY MOUTH ONCE DAILY   Quantity:  30 tablet   Refills:  5       metoprolol tartrate 25 MG tablet   Commonly known as:  LOPRESSOR   Used for:  Benign essential hypertension        Dose:  50 mg   Take 2 tablets (50 mg) by mouth 2 times daily   Quantity:  120 tablet   Refills:  5       nicotine 14 MG/24HR 24 hr patch   Commonly known as:  NICODERM CQ   Used for:  Tobacco abuse        Dose:  1 patch   Place 1 patch onto the skin every 24 hours   Quantity:  30 patch   Refills:  1       omeprazole 20 MG CR capsule   Commonly known as:  priLOSEC   Used for:  Gastroesophageal reflux disease without esophagitis        Dose:  40 mg   Take 2  capsules (40 mg) by mouth daily   Quantity:  120 capsule   Refills:  5       ROCEPHIN IV        Dose:  2000 mL   Inject 2,000 mLs into the vein daily   Refills:  0                Protect others around you: Learn how to safely use, store and throw away your medicines at www.disposemymeds.org.             Medication List: This is a list of all your medications and when to take them. Check marks below indicate your daily home schedule. Keep this list as a reference.      Medications           Morning Afternoon Evening Bedtime As Needed    albuterol (2.5 MG/3ML) 0.083% neb solution   Take 1 vial (2.5 mg) by nebulization every 4 hours as needed for shortness of breath / dyspnea                                amLODIPine 10 MG tablet   Commonly known as:  NORVASC   Take 1 tablet (10 mg) by mouth daily                                aspirin 325 MG tablet   Take 1 tablet by mouth every 4 hours as needed. For pain                                hydrochlorothiazide 12.5 MG Tabs tablet   TAKE ONE TABLET BY MOUTH ONCE DAILY                                HYDROcodone-acetaminophen 5-325 MG per tablet   Commonly known as:  NORCO   Take 1 tablet by mouth every 6 hours as needed for severe pain maximum 6 tablet(s) per day                                lisinopril 40 MG tablet   Commonly known as:  PRINIVIL/ZESTRIL   TAKE ONE TABLET BY MOUTH ONCE DAILY                                metoprolol tartrate 25 MG tablet   Commonly known as:  LOPRESSOR   Take 2 tablets (50 mg) by mouth 2 times daily                                nicotine 14 MG/24HR 24 hr patch   Commonly known as:  NICODERM CQ   Place 1 patch onto the skin every 24 hours                                omeprazole 20 MG CR capsule   Commonly known as:  priLOSEC   Take 2 capsules (40 mg) by mouth daily                                ROCEPHIN IV   Inject 2,000 mLs into the vein daily

## 2018-04-07 NOTE — PLAN OF CARE
Ramo Berry is a 58 year old male, here accompanied by wife today for infusion of Rocephin per order of MD.  Patient meets parameters for today's infusion. Patient identified with two identifiers, order verified, and verbal consent for today's infusion obtained from patient.  Patient meets order parameters for today's treatment.     20G PIV inserted into right hand.  Immediate blood return noted.  IV secured with sterile, transparent dressing and tape.  Patient tolerated well, denies pain or discomfort at this time.  Flushes easily without resistance, no signs or symptoms of infiltration or infection.   Patient denies questions or concerns regarding infusion and/or medication(s) being administered.    IV removed, catheter intact.  Site clean, dry and intact.  Copy of appointments, discharge instructions, and after visit summary (AVS) provided to patient.  Patient states understanding, discharged ambulatory.        Marlene Tafoya RN

## 2018-04-07 NOTE — IP AVS SNAPSHOT
[facility]    [address] [address]    Phone:  [phone]    Fax:  [phone]                                       After Visit Summary   4/7/2018    Ramo Berry    MRN: [id number]           After Visit Summary Signature Page     I have received my discharge instructions, and my questions have been answered. I have discussed any challenges I see with this plan with the nurse or doctor.    ..........................................................................................................................................  Patient/Patient Representative Signature      ..........................................................................................................................................  Patient Representative Print Name and Relationship to Patient    ..................................................               ................................................  Date                                            Time    ..........................................................................................................................................  Reviewed by Signature/Title    ...................................................              ..............................................  Date                                                            Time

## 2018-04-09 ENCOUNTER — TELEPHONE (OUTPATIENT)
Dept: INTERVENTIONAL RADIOLOGY/VASCULAR | Facility: HOSPITAL | Age: 58
End: 2018-04-09

## 2018-04-09 ENCOUNTER — INFUSION THERAPY VISIT (OUTPATIENT)
Dept: INFUSION THERAPY | Facility: OTHER | Age: 58
End: 2018-04-09
Attending: FAMILY MEDICINE
Payer: MEDICARE

## 2018-04-09 VITALS
HEART RATE: 93 BPM | WEIGHT: 168.5 LBS | OXYGEN SATURATION: 92 % | BODY MASS INDEX: 24.12 KG/M2 | RESPIRATION RATE: 16 BRPM | SYSTOLIC BLOOD PRESSURE: 107 MMHG | TEMPERATURE: 98.2 F | HEIGHT: 70 IN | DIASTOLIC BLOOD PRESSURE: 70 MMHG

## 2018-04-09 DIAGNOSIS — J15.9 COMMUNITY ACQUIRED BACTERIAL PNEUMONIA: Primary | ICD-10-CM

## 2018-04-09 PROCEDURE — 25000128 H RX IP 250 OP 636: Performed by: FAMILY MEDICINE

## 2018-04-09 PROCEDURE — 96365 THER/PROPH/DIAG IV INF INIT: CPT

## 2018-04-09 RX ORDER — CEFTRIAXONE SODIUM 2 G/50ML
2 INJECTION, SOLUTION INTRAVENOUS ONCE
Status: CANCELLED | OUTPATIENT
Start: 2018-04-09 | End: 2018-04-09

## 2018-04-09 RX ORDER — CEFTRIAXONE SODIUM 2 G/50ML
2 INJECTION, SOLUTION INTRAVENOUS ONCE
Status: COMPLETED | OUTPATIENT
Start: 2018-04-09 | End: 2018-04-09

## 2018-04-09 RX ADMIN — CEFTRIAXONE SODIUM 2 G: 2 INJECTION, SOLUTION INTRAVENOUS at 08:52

## 2018-04-09 NOTE — MR AVS SNAPSHOT
After Visit Summary   2018    Ramo Berry    MRN: [id number]           Patient Information     Date Of Birth          1960        Visit Information        Provider Department      2018 8:30 AM  INF  [address] [facility] [place]        Today's Diagnoses     Community acquired bacterial pneumonia    -  1      Care Instructions    Ceftriaxone Sodium Solution for injection  What is this medicine?  CEFTRIAXONE (sef try AX one) is a cephalosporin antibiotic. It is used to treat certain kinds of bacterial infections. It will not work for colds, flu, or other viral infections.  This medicine may be used for other purposes; ask your health care provider or pharmacist if you have questions.  What should I tell my health care provider before I take this medicine?  They need to know if you have any of these conditions:    any chronic illness    bowel disease, like colitis    both kidney and liver disease    high bilirubin level in  patients    an unusual or allergic reaction to ceftriaxone, other cephalosporin or penicillin antibiotics, foods, dyes, or preservatives    pregnant or trying to get pregnant    breast-feeding  How should I use this medicine?  This medicine is injected into a muscle or infused it into a vein. It is usually given in a medical office or clinic. If you are to give this medicine you will be taught how to inject it. Follow instructions carefully. Use your doses at regular intervals. Do not take your medicine more often than directed. Do not skip doses or stop your medicine early even if you feel better. Do not stop taking except on your doctor's advice.  Talk to your pediatrician regarding the use of this medicine in children. Special care may be needed.  Overdosage: If you think you have taken too much of this medicine contact a poison control center or emergency room at once.  NOTE: This medicine is only for you. Do not share this medicine with others.  What if  I miss a dose?  If you miss a dose, take it as soon as you can. If it is almost time for your next dose, take only that dose. Do not take double or extra doses.  What may interact with this medicine?  Do not take this medicine with any of the following medications:    intravenous calcium  This medicine may also interact with the following medications:    birth control pills  This list may not describe all possible interactions. Give your health care provider a list of all the medicines, herbs, non-prescription drugs, or dietary supplements you use. Also tell them if you smoke, drink alcohol, or use illegal drugs. Some items may interact with your medicine.  What should I watch for while using this medicine?  Tell your doctor or health care professional if your symptoms do not improve or if they get worse.  Do not treat diarrhea with over the counter products. Contact your doctor if you have diarrhea that lasts more than 2 days or if it is severe and watery.  If you are being treated for a sexually transmitted disease, avoid sexual contact until you have finished your treatment. Having sex can infect your sexual partner.  Calcium may bind to this medicine and cause lung or kidney problems. Avoid calcium products while taking this medicine and for 48 hours after taking the last dose of this medicine.  What side effects may I notice from receiving this medicine?  Side effects that you should report to your doctor or health care professional as soon as possible:    allergic reactions like skin rash, itching or hives, swelling of the face, lips, or tongue    breathing problems    fever, chills    irregular heartbeat    pain when passing urine    seizures    stomach pain, cramps    unusual bleeding, bruising    unusually weak or tired  Side effects that usually do not require medical attention (report to your doctor or health care professional if they continue or are bothersome):    diarrhea    dizzy,  drowsy    headache    nausea, vomiting    pain, swelling, irritation where injected    stomach upset    sweating  This list may not describe all possible side effects. Call your doctor for medical advice about side effects. You may report side effects to FDA at 5-659-QSY-5383.  NOTE:This sheet is a summary. It may not cover all possible information. If you have questions about this medicine, talk to your doctor, pharmacist, or health care provider. Copyright  2016 Gold Standard                Follow-ups after your visit        Your next 10 appointments already scheduled     Apr 09, 2018 12:30 PM CDT   (Arrive by 12:15 PM)   IR PICC PLACEMENT > 5 YRS OF AGE LEFT with HIIR1, HIXRRN, HIIRRAD   [facility] RAD ([facility] )    [address] [place] [id number] [phone]           1. You will need to have had a history and physical exam within 7 days of the procedure. 2. Laboratory test are to be obtained by your doctor prior to the exam (CBCP, INR and PTT) 3. Someone will need to drive you to and from the hospital. 4. If you are or may be pregnant, contact your doctor or a Radiology nurse prior to the day of the exam. 5. If you have diabetes, check with your doctor or a Radiology nurse to see if your insulin needs to be adjusted for the exam. 6. If you are taking Coumadin (to thin you blood) please contact your doctor or a Radiology nurse at least 3 days before the exam for special instructions. 7. The day before your exam you may eat your regular diet and are encouraged to drink at least 2 quarts of clear liquids. Drink no alcoholic beverages for 24 hours prior to the exam. 8. Do not eat any solid food or milk products for 6 hours prior to the exam. You may drink clear liquids until 2 hours prior to the exam. Clear liquids include the following: water, Jell-O, clear broth, apple juice or any noncarbonated drink that you can see through (no pop!) 9. The morning of the exam you may brush  your teeth and take medications as directed with a sip of water. 10. Tell the Radiology nurse if you have any allergies.            Apr 10, 2018  9:00 AM CDT   Level 2 with HC INF RM 3310   [facility] [place] ([facility] [place] )    [address]  [place] [address]   [phone]            Apr 11, 2018  9:00 AM CDT   Level 2 with HC INF RM 3312   [facility] [place] ([facility] [place] )    [address]  [place] [address]   [phone]            Apr 12, 2018  8:30 AM CDT   Level 2 with HC INF RM 3302   [facility] [place] ([facility] [place] )    [address]  [place] [address]   [phone]            Apr 13, 2018 10:30 AM CDT   Level 2 with HC INF RM 3310   [facility] [place] ([facility] [place] )    [address]  [place] [address]   [phone]            May 14, 2018  9:45 AM CDT   (Arrive by 9:30 AM)   Office Visit with JUSTUS Santillan MD   [facility] [place] ([facility] [place] )    [address]  [place] [address]   [phone]           Bring a current list of meds and any records pertaining to this visit.  For Physicals, please bring immunization records and any forms needing to be filled out.  Please arrive 15 minutes early to complete paperwork and register.              Who to contact     If you have questions or need follow up information about today's clinic visit or your schedule please contact [facility] directly at [phone].  Normal or non-critical lab and imaging results will be communicated to you by MyChart, letter or phone within 4 business days after the clinic has received the results. If you do not hear from us within 7 days, please contact the clinic through MyChart or phone. If you have a critical or abnormal lab result, we will notify you by phone as soon as possible.  Submit refill requests through [employer] or call your pharmacy and they will forward  "the refill request to us. Please allow 3 business days for your refill to be completed.          Additional Information About Your Visit        MyChart Information     CareSimply lets you send messages to your doctor, view your test results, renew your prescriptions, schedule appointments and more. To sign up, go to [email or web address] . Click on \"Log in\" on the left side of the screen, which will take you to the Welcome page. Then click on \"Sign up Now\" on the right side of the page.     You will be asked to enter the access code listed below, as well as some personal information. Please follow the directions to create your username and password.     Your access code is: [email or web address]  Expires: 2018 11:03 AM     Your access code will  in 90 days. If you need help or a new code, please call your [place] clinic or [phone].        Care EveryWhere ID     This is your Care EveryWhere ID. This could be used by other organizations to access your [place] medical records  [id number]        Your Vitals Were     Pulse Temperature Respirations Height Pulse Oximetry BMI (Body Mass Index)    93 98.2  F (36.8  C) (Tympanic) 16 1.778 m (5' 10\") 92% 24.18 kg/m2       Blood Pressure from Last 3 Encounters:   18 107/70   18 115/74   18 117/78    Weight from Last 3 Encounters:   18 76.4 kg (168 lb 8 oz)   18 75.2 kg (165 lb 12.8 oz)   18 74.8 kg (165 lb)              Today, you had the following     No orders found for display       Primary Care Provider Office Phone # Fax #    R Nayan Santillan [phone] [address] [address]        Equal Access to Services     MADALYN SONI : Cindy Bustamante, brian diaz, arin grant. [facility] [phone].    ATENCIÓN: Si habla español, tiene a blackwell disposición servicios gratuitos de asistencia lingüística. Llame al " [phone].    We comply with applicable federal civil rights laws and [place] laws. We do not discriminate on the basis of race, color, national origin, age, disability, sex, sexual orientation, or gender identity.            Thank you!     Thank you for choosing [facility]  for your care. Our goal is always to provide you with excellent care. Hearing back from our patients is one way we can continue to improve our services. Please take a few minutes to complete the written survey that you may receive in the mail after your visit with us. Thank you!             Your Updated Medication List - Protect others around you: Learn how to safely use, store and throw away your medicines at www.disposemymeds.org.          This list is accurate as of 4/9/18  9:24 AM.  Always use your most recent med list.                   Brand Name Dispense Instructions for use Diagnosis    albuterol (2.5 MG/3ML) 0.083% neb solution     1 Box    Take 1 vial (2.5 mg) by nebulization every 4 hours as needed for shortness of breath / dyspnea        amLODIPine 10 MG tablet    NORVASC    90 tablet    Take 1 tablet (10 mg) by mouth daily    Benign essential hypertension       aspirin 325 MG tablet      Take 1 tablet by mouth every 4 hours as needed. For pain        hydrochlorothiazide 12.5 MG Tabs tablet     90 tablet    TAKE ONE TABLET BY MOUTH ONCE DAILY    Essential hypertension, benign       HYDROcodone-acetaminophen 5-325 MG per tablet    NORCO    20 tablet    Take 1 tablet by mouth every 6 hours as needed for severe pain maximum 6 tablet(s) per day    History of lung surgery       lisinopril 40 MG tablet    PRINIVIL/ZESTRIL    30 tablet    TAKE ONE TABLET BY MOUTH ONCE DAILY    Benign essential hypertension       metoprolol tartrate 25 MG tablet    LOPRESSOR    120 tablet    Take 2 tablets (50 mg) by mouth 2 times daily    Benign essential hypertension       nicotine 14 MG/24HR 24 hr patch    NICODERM CQ    30 patch     Place 1 patch onto the skin every 24 hours    Tobacco abuse       omeprazole 20 MG CR capsule    priLOSEC    120 capsule    Take 2 capsules (40 mg) by mouth daily    Gastroesophageal reflux disease without esophagitis       ROCEPHIN IV      Inject 2,000 mLs into the vein daily

## 2018-04-09 NOTE — PATIENT INSTRUCTIONS
Ceftriaxone Sodium Solution for injection  What is this medicine?  CEFTRIAXONE (sef try AX one) is a cephalosporin antibiotic. It is used to treat certain kinds of bacterial infections. It will not work for colds, flu, or other viral infections.  This medicine may be used for other purposes; ask your health care provider or pharmacist if you have questions.  What should I tell my health care provider before I take this medicine?  They need to know if you have any of these conditions:    any chronic illness    bowel disease, like colitis    both kidney and liver disease    high bilirubin level in  patients    an unusual or allergic reaction to ceftriaxone, other cephalosporin or penicillin antibiotics, foods, dyes, or preservatives    pregnant or trying to get pregnant    breast-feeding  How should I use this medicine?  This medicine is injected into a muscle or infused it into a vein. It is usually given in a medical office or clinic. If you are to give this medicine you will be taught how to inject it. Follow instructions carefully. Use your doses at regular intervals. Do not take your medicine more often than directed. Do not skip doses or stop your medicine early even if you feel better. Do not stop taking except on your doctor's advice.  Talk to your pediatrician regarding the use of this medicine in children. Special care may be needed.  Overdosage: If you think you have taken too much of this medicine contact a poison control center or emergency room at once.  NOTE: This medicine is only for you. Do not share this medicine with others.  What if I miss a dose?  If you miss a dose, take it as soon as you can. If it is almost time for your next dose, take only that dose. Do not take double or extra doses.  What may interact with this medicine?  Do not take this medicine with any of the following medications:    intravenous calcium  This medicine may also interact with the following medications:    birth  control pills  This list may not describe all possible interactions. Give your health care provider a list of all the medicines, herbs, non-prescription drugs, or dietary supplements you use. Also tell them if you smoke, drink alcohol, or use illegal drugs. Some items may interact with your medicine.  What should I watch for while using this medicine?  Tell your doctor or health care professional if your symptoms do not improve or if they get worse.  Do not treat diarrhea with over the counter products. Contact your doctor if you have diarrhea that lasts more than 2 days or if it is severe and watery.  If you are being treated for a sexually transmitted disease, avoid sexual contact until you have finished your treatment. Having sex can infect your sexual partner.  Calcium may bind to this medicine and cause lung or kidney problems. Avoid calcium products while taking this medicine and for 48 hours after taking the last dose of this medicine.  What side effects may I notice from receiving this medicine?  Side effects that you should report to your doctor or health care professional as soon as possible:    allergic reactions like skin rash, itching or hives, swelling of the face, lips, or tongue    breathing problems    fever, chills    irregular heartbeat    pain when passing urine    seizures    stomach pain, cramps    unusual bleeding, bruising    unusually weak or tired  Side effects that usually do not require medical attention (report to your doctor or health care professional if they continue or are bothersome):    diarrhea    dizzy, drowsy    headache    nausea, vomiting    pain, swelling, irritation where injected    stomach upset    sweating  This list may not describe all possible side effects. Call your doctor for medical advice about side effects. You may report side effects to FDA at 3-034-TZZ-7380.  NOTE:This sheet is a summary. It may not cover all possible information. If you have questions about  this medicine, talk to your doctor, pharmacist, or health care provider. Copyright  2016 Gold Standard

## 2018-04-09 NOTE — PROGRESS NOTES
Patient is a 58 year old male here accompanied by self today for infusion of Rocephin per order of Dr. Nayan Villavicencio. Patient meets parameters for today's infusion. Patient identified with two identifiers, order verified, and verbal consent for today's infusion obtained from patient.       0848: 22 gauge angio cath inserted into left AS after one unsuccessful attempt.  Immediate blood return noted.  IV secured with sterile, transparent dressing and tape.  Patient tolerated well, denies pain or discomfort at this time.  Flushes easily without resistance, no signs or symptoms of infiltration or infection.   Patient denies questions or concerns regarding infusion and/or medication(s) being administered.    0852: IV pump verified with Rocephin 2 gram dose, drug, and rate of administration with MAR and medication label.  Infusion administered per protocol.  Patient tolerated infusion well, no signs or symptoms of adverse reaction noted.  Patient denies pain nor discomfort.     IV removed, catheter intact.  Site clean, dry and intact.  No signs or symptoms of infiltration or infection.  Covered with a sterile bandage, slight pressure applied for 30 seconds.  Pt instructed to leave bandage intact for a minimum of one hour, and to call with questions or concerns.  Copy of appointments, discharge instructions, and after visit summary (AVS) provided to patient.  Patient states understanding, discharged ambulatory.  Ginny Peterson RN

## 2018-04-09 NOTE — TELEPHONE ENCOUNTER
"Patient with appointment for PICC line at 12:30.  Out to apologize to patient that we were running late and would be out in 15 minutes, out to retrieve patient at 1303 and patient was no longer in the lobby.  Looked throughout the lobby x3, checked in rest room, checked with volunteer to see if she remembers seeing anyone with the patients description leave the facility.  \"I don't but I don't always see who is coming and going.\"  Phoned patient and left message apologizing and requesting that he call back.  Patient called back requesting appointment for tomorrow for PICC line placement.  Able to accommodate patient.  "

## 2018-04-10 ENCOUNTER — HOSPITAL ENCOUNTER (OUTPATIENT)
Dept: INTERVENTIONAL RADIOLOGY/VASCULAR | Facility: HOSPITAL | Age: 58
Discharge: HOME | End: 2018-04-10
Attending: FAMILY MEDICINE | Admitting: FAMILY MEDICINE
Payer: MEDICARE

## 2018-04-10 ENCOUNTER — INFUSION THERAPY VISIT (OUTPATIENT)
Dept: INFUSION THERAPY | Facility: OTHER | Age: 58
End: 2018-04-10
Attending: FAMILY MEDICINE
Payer: MEDICARE

## 2018-04-10 VITALS
TEMPERATURE: 98 F | DIASTOLIC BLOOD PRESSURE: 79 MMHG | RESPIRATION RATE: 16 BRPM | HEART RATE: 102 BPM | OXYGEN SATURATION: 90 % | SYSTOLIC BLOOD PRESSURE: 109 MMHG

## 2018-04-10 VITALS
OXYGEN SATURATION: 91 % | RESPIRATION RATE: 18 BRPM | SYSTOLIC BLOOD PRESSURE: 122 MMHG | DIASTOLIC BLOOD PRESSURE: 79 MMHG

## 2018-04-10 DIAGNOSIS — Z98.890 HISTORY OF LUNG SURGERY: ICD-10-CM

## 2018-04-10 DIAGNOSIS — Z79.2 LONG TERM (CURRENT) USE OF ANTIBIOTICS: ICD-10-CM

## 2018-04-10 DIAGNOSIS — J15.9 COMMUNITY ACQUIRED BACTERIAL PNEUMONIA: Primary | ICD-10-CM

## 2018-04-10 PROCEDURE — 25000128 H RX IP 250 OP 636: Performed by: FAMILY MEDICINE

## 2018-04-10 PROCEDURE — 25000125 ZZHC RX 250

## 2018-04-10 PROCEDURE — 25000128 H RX IP 250 OP 636: Performed by: RADIOLOGY

## 2018-04-10 PROCEDURE — 77001 FLUOROGUIDE FOR VEIN DEVICE: CPT | Mod: TC

## 2018-04-10 PROCEDURE — 96365 THER/PROPH/DIAG IV INF INIT: CPT | Mod: 59

## 2018-04-10 RX ORDER — HYDROCODONE BITARTRATE AND ACETAMINOPHEN 5; 325 MG/1; MG/1
1 TABLET ORAL EVERY 6 HOURS PRN
Qty: 20 TABLET | Refills: 0 | Status: SHIPPED | OUTPATIENT
Start: 2018-04-10 | End: 2018-04-16

## 2018-04-10 RX ORDER — LIDOCAINE HYDROCHLORIDE 10 MG/ML
INJECTION, SOLUTION EPIDURAL; INFILTRATION; INTRACAUDAL; PERINEURAL
Status: COMPLETED
Start: 2018-04-10 | End: 2018-04-10

## 2018-04-10 RX ORDER — CEFTRIAXONE SODIUM 2 G/50ML
2 INJECTION, SOLUTION INTRAVENOUS ONCE
Status: COMPLETED | OUTPATIENT
Start: 2018-04-10 | End: 2018-04-10

## 2018-04-10 RX ORDER — CEFTRIAXONE SODIUM 2 G/50ML
2 INJECTION, SOLUTION INTRAVENOUS ONCE
Status: CANCELLED | OUTPATIENT
Start: 2018-04-10 | End: 2018-04-10

## 2018-04-10 RX ORDER — IOPAMIDOL 612 MG/ML
50 INJECTION, SOLUTION INTRAVASCULAR ONCE
Status: COMPLETED | OUTPATIENT
Start: 2018-04-10 | End: 2018-04-10

## 2018-04-10 RX ADMIN — LIDOCAINE HYDROCHLORIDE 2 ML: 10 INJECTION, SOLUTION EPIDURAL; INFILTRATION; INTRACAUDAL; PERINEURAL at 10:59

## 2018-04-10 RX ADMIN — CEFTRIAXONE SODIUM 2 G: 2 INJECTION, SOLUTION INTRAVENOUS at 09:13

## 2018-04-10 RX ADMIN — IOPAMIDOL 5 ML: 612 INJECTION, SOLUTION INTRAVENOUS at 11:07

## 2018-04-10 ASSESSMENT — PAIN DESCRIPTION - DESCRIPTORS: DESCRIPTORS: SORE;CONSTANT

## 2018-04-10 NOTE — TELEPHONE ENCOUNTER
norco      Last Written Prescription Date:  4/6/18  Last Fill Quantity: 20,   # refills: 0  Last Office Visit: 4/6/18  Future Office visit:    Next 5 appointments (look out 90 days)     May 14, 2018  9:45 AM CDT   (Arrive by 9:30 AM)   Office Visit with JUSTUS Santillan MD   [facility] [place] ([facility] [place] )    [address]  [place] [address]   [phone]                   Routing refill request to provider for review/approval because:  Drug not on the [facility], [facility] or  Health refill protocol or controlled substance

## 2018-04-10 NOTE — PROGRESS NOTES
There were  no complications and patient has no symptoms..      Tolerated procedure well.    Procedure: PICC line placement    Radiologist:Dr. Burgess    Time Out: Prior to the start of the procedure and with procedural staff participation, I verbally confirmed the patient s identity using two indicators, relevant allergies, that the procedure was appropriate and matched the consent or emergent situation, and that the correct equipment/implants were available. Immediately prior to starting the procedure I conducted the Time Out with the procedural staff and re-confirmed the patient s name, procedure, and site/side. (The Joint Commission universal protocol was followed.)  Yes    Position:  supine    Pain:  3/10.  This is pain from prior procedure.  Patient took medications at home for pain med    Sedation:None. Local Anesthestic used  No sedation    Estimated Blood Loss: Minimal     Condition: Stable    Comments: See dictated procedure note for full details     Vale Gibson RN

## 2018-04-10 NOTE — TELEPHONE ENCOUNTER
Pt notified that the written RX is ready at the [facility]  registration to be picked up.

## 2018-04-10 NOTE — MR AVS SNAPSHOT
After Visit Summary   4/10/2018    Ramo Berry    MRN: [id number]           Patient Information     Date Of Birth          1960        Visit Information        Provider Department      4/10/2018 9:00 AM  INF RM 3310 [facility]        Today's Diagnoses     Community acquired bacterial pneumonia    -  1      Care Instructions    Ceftriaxone Sodium Solution for injection  What is this medicine?  CEFTRIAXONE (sef try AX one) is a cephalosporin antibiotic. It is used to treat certain kinds of bacterial infections. It will not work for colds, flu, or other viral infections.  This medicine may be used for other purposes; ask your health care provider or pharmacist if you have questions.  What should I tell my health care provider before I take this medicine?  They need to know if you have any of these conditions:    any chronic illness    bowel disease, like colitis    both kidney and liver disease    high bilirubin level in  patients    an unusual or allergic reaction to ceftriaxone, other cephalosporin or penicillin antibiotics, foods, dyes, or preservatives    pregnant or trying to get pregnant    breast-feeding  How should I use this medicine?  This medicine is injected into a muscle or infused it into a vein. It is usually given in a medical office or clinic. If you are to give this medicine you will be taught how to inject it. Follow instructions carefully. Use your doses at regular intervals. Do not take your medicine more often than directed. Do not skip doses or stop your medicine early even if you feel better. Do not stop taking except on your doctor's advice.  Talk to your pediatrician regarding the use of this medicine in children. Special care may be needed.  Overdosage: If you think you have taken too much of this medicine contact a poison control center or emergency room at once.  NOTE: This medicine is only for you. Do not share this medicine with others.  What  if I miss a dose?  If you miss a dose, take it as soon as you can. If it is almost time for your next dose, take only that dose. Do not take double or extra doses.  What may interact with this medicine?  Do not take this medicine with any of the following medications:    intravenous calcium  This medicine may also interact with the following medications:    birth control pills  This list may not describe all possible interactions. Give your health care provider a list of all the medicines, herbs, non-prescription drugs, or dietary supplements you use. Also tell them if you smoke, drink alcohol, or use illegal drugs. Some items may interact with your medicine.  What should I watch for while using this medicine?  Tell your doctor or health care professional if your symptoms do not improve or if they get worse.  Do not treat diarrhea with over the counter products. Contact your doctor if you have diarrhea that lasts more than 2 days or if it is severe and watery.  If you are being treated for a sexually transmitted disease, avoid sexual contact until you have finished your treatment. Having sex can infect your sexual partner.  Calcium may bind to this medicine and cause lung or kidney problems. Avoid calcium products while taking this medicine and for 48 hours after taking the last dose of this medicine.  What side effects may I notice from receiving this medicine?  Side effects that you should report to your doctor or health care professional as soon as possible:    allergic reactions like skin rash, itching or hives, swelling of the face, lips, or tongue    breathing problems    fever, chills    irregular heartbeat    pain when passing urine    seizures    stomach pain, cramps    unusual bleeding, bruising    unusually weak or tired  Side effects that usually do not require medical attention (report to your doctor or health care professional if they continue or are bothersome):    diarrhea    dizzy,  drowsy    headache    nausea, vomiting    pain, swelling, irritation where injected    stomach upset    sweating  This list may not describe all possible side effects. Call your doctor for medical advice about side effects. You may report side effects to FDA at 9-204-XUH-6152.  NOTE:This sheet is a summary. It may not cover all possible information. If you have questions about this medicine, talk to your doctor, pharmacist, or health care provider. Copyright  2016 Gold Standard                Follow-ups after your visit        Your next 10 appointments already scheduled     Apr 11, 2018  9:00 AM CDT   Level 2 with HC INF RM 3312   [facility] [place] ([facility] [place] )    [address]  [place] [address]   [phone]            Apr 12, 2018  8:30 AM CDT   Level 2 with HC INF RM 3302   [facility] [place] ([facility] [place] )    [address]  [place] [address]   [phone]            Apr 13, 2018 10:30 AM CDT   Level 2 with HC INF RM 3310   [facility] [place] ([facility] [place] )    [address]  [place] [address]   [phone]            May 14, 2018  9:45 AM CDT   (Arrive by 9:30 AM)   Office Visit with JUSTUS Santillan MD   [facility] ([facility] [place] )    [address]  [place] [address]   [phone]           Bring a current list of meds and any records pertaining to this visit.  For Physicals, please bring immunization records and any forms needing to be filled out.  Please arrive 15 minutes early to complete paperwork and register.              Who to contact     If you have questions or need follow up information about today's clinic visit or your schedule please contact [facility] directly at [phone].  Normal or non-critical lab and imaging results will be communicated to you by MyChart, letter or phone within 4 business days after the clinic has received the results. If you do  "not hear from us within 7 days, please contact the clinic through You.Do or phone. If you have a critical or abnormal lab result, we will notify you by phone as soon as possible.  Submit refill requests through You.Do or call your pharmacy and they will forward the refill request to us. Please allow 3 business days for your refill to be completed.          Additional Information About Your Visit        Planet Expathart Information     You.Do lets you send messages to your doctor, view your test results, renew your prescriptions, schedule appointments and more. To sign up, go to [email or web address] . Click on \"Log in\" on the left side of the screen, which will take you to the Welcome page. Then click on \"Sign up Now\" on the right side of the page.     You will be asked to enter the access code listed below, as well as some personal information. Please follow the directions to create your username and password.     Your access code is: [email or web address]  Expires: 2018 11:03 AM     Your access code will  in 90 days. If you need help or a new code, please call your [place] clinic or [phone].        Care EveryWhere ID     This is your Care EveryWhere ID. This could be used by other organizations to access your [place] medical records  [id number]        Your Vitals Were     Pulse Temperature Respirations Pulse Oximetry          102 98  F (36.7  C) (Tympanic) 16 90%         Blood Pressure from Last 3 Encounters:   04/10/18 109/79   18 107/70   18 115/74    Weight from Last 3 Encounters:   18 76.4 kg (168 lb 8 oz)   18 75.2 kg (165 lb 12.8 oz)   18 74.8 kg (165 lb)              Today, you had the following     No orders found for display       Primary Care Provider Office Phone # Fax #    R Nayan Santillan [phone] [address] [facility] [address]        Equal Access to Services     MADALYN SONI AH: Hadii jolene Bustamante, rbian wrightadaabhinav, qaybta " arin middleton hayann leemode pate ah. So [facility] [phone].    ATENCIÓN: Si clementina mata, tiene a blackwell disposición servicios gratuitos de asistencia lingüística. Lizz al [phone].    We comply with applicable federal civil rights laws and [place] laws. We do not discriminate on the basis of race, color, national origin, age, disability, sex, sexual orientation, or gender identity.            Thank you!     Thank you for choosing [facility]  for your care. Our goal is always to provide you with excellent care. Hearing back from our patients is one way we can continue to improve our services. Please take a few minutes to complete the written survey that you may receive in the mail after your visit with us. Thank you!             Your Updated Medication List - Protect others around you: Learn how to safely use, store and throw away your medicines at www.disposemymeds.org.          This list is accurate as of 4/10/18  9:54 AM.  Always use your most recent med list.                   Brand Name Dispense Instructions for use Diagnosis    albuterol (2.5 MG/3ML) 0.083% neb solution     1 Box    Take 1 vial (2.5 mg) by nebulization every 4 hours as needed for shortness of breath / dyspnea        amLODIPine 10 MG tablet    NORVASC    90 tablet    Take 1 tablet (10 mg) by mouth daily    Benign essential hypertension       aspirin 325 MG tablet      Take 1 tablet by mouth every 4 hours as needed. For pain        hydrochlorothiazide 12.5 MG Tabs tablet     90 tablet    TAKE ONE TABLET BY MOUTH ONCE DAILY    Essential hypertension, benign       HYDROcodone-acetaminophen 5-325 MG per tablet    NORCO    20 tablet    Take 1 tablet by mouth every 6 hours as needed for severe pain maximum 6 tablet(s) per day    History of lung surgery       lisinopril 40 MG tablet    PRINIVIL/ZESTRIL    30 tablet    TAKE ONE TABLET BY MOUTH ONCE DAILY    Benign essential hypertension       metoprolol  tartrate 25 MG tablet    LOPRESSOR    120 tablet    Take 2 tablets (50 mg) by mouth 2 times daily    Benign essential hypertension       nicotine 14 MG/24HR 24 hr patch    NICODERM CQ    30 patch    Place 1 patch onto the skin every 24 hours    Tobacco abuse       omeprazole 20 MG CR capsule    priLOSEC    120 capsule    Take 2 capsules (40 mg) by mouth daily    Gastroesophageal reflux disease without esophagitis       ROCEPHIN IV      Inject 2,000 mLs into the vein daily

## 2018-04-10 NOTE — PROGRESS NOTES
Patient is a 58 year old male here accompanied by significant other today for infusion of Rocephin per order of Dr. Nayan Santillan.   Patient meets parameters for today's infusion. Patient identified with two identifiers, order verified, and verbal consent for today's infusion obtained from patient.    PICC line being placed following this encounter.  22 gauge angio cath inserted into left AS.  Immediate blood return noted.  IV secured with sterile, transparent dressing and tape.  Patient tolerated well, denies pain or discomfort at this time.  Flushes easily without resistance, no signs or symptoms of infiltration or infection.   Patient denies questions or concerns regarding infusion and/or medication(s) being administered.    0913: IV pump verified with Rocephin 2 gram dose, drug, and rate of administration with MAR and medication label.  Infusion administered per protocol.  Patient tolerated infusion well, no signs or symptoms of adverse reaction noted.  Patient denies pain nor discomfort.     IV removed, catheter intact.  Site clean, dry and intact.  No signs or symptoms of infiltration or infection.  Covered with a sterile bandage, slight pressure applied for 30 seconds.  Pt instructed to leave bandage intact for a minimum of one hour, and to call with questions or concerns.  Copy of appointments, discharge instructions, and after visit summary (AVS) provided to patient.  Patient states understanding, discharged ambulatory.  Ginny Peterson RN

## 2018-04-10 NOTE — PATIENT INSTRUCTIONS
Ceftriaxone Sodium Solution for injection  What is this medicine?  CEFTRIAXONE (sef try AX one) is a cephalosporin antibiotic. It is used to treat certain kinds of bacterial infections. It will not work for colds, flu, or other viral infections.  This medicine may be used for other purposes; ask your health care provider or pharmacist if you have questions.  What should I tell my health care provider before I take this medicine?  They need to know if you have any of these conditions:    any chronic illness    bowel disease, like colitis    both kidney and liver disease    high bilirubin level in  patients    an unusual or allergic reaction to ceftriaxone, other cephalosporin or penicillin antibiotics, foods, dyes, or preservatives    pregnant or trying to get pregnant    breast-feeding  How should I use this medicine?  This medicine is injected into a muscle or infused it into a vein. It is usually given in a medical office or clinic. If you are to give this medicine you will be taught how to inject it. Follow instructions carefully. Use your doses at regular intervals. Do not take your medicine more often than directed. Do not skip doses or stop your medicine early even if you feel better. Do not stop taking except on your doctor's advice.  Talk to your pediatrician regarding the use of this medicine in children. Special care may be needed.  Overdosage: If you think you have taken too much of this medicine contact a poison control center or emergency room at once.  NOTE: This medicine is only for you. Do not share this medicine with others.  What if I miss a dose?  If you miss a dose, take it as soon as you can. If it is almost time for your next dose, take only that dose. Do not take double or extra doses.  What may interact with this medicine?  Do not take this medicine with any of the following medications:    intravenous calcium  This medicine may also interact with the following medications:    birth  control pills  This list may not describe all possible interactions. Give your health care provider a list of all the medicines, herbs, non-prescription drugs, or dietary supplements you use. Also tell them if you smoke, drink alcohol, or use illegal drugs. Some items may interact with your medicine.  What should I watch for while using this medicine?  Tell your doctor or health care professional if your symptoms do not improve or if they get worse.  Do not treat diarrhea with over the counter products. Contact your doctor if you have diarrhea that lasts more than 2 days or if it is severe and watery.  If you are being treated for a sexually transmitted disease, avoid sexual contact until you have finished your treatment. Having sex can infect your sexual partner.  Calcium may bind to this medicine and cause lung or kidney problems. Avoid calcium products while taking this medicine and for 48 hours after taking the last dose of this medicine.  What side effects may I notice from receiving this medicine?  Side effects that you should report to your doctor or health care professional as soon as possible:    allergic reactions like skin rash, itching or hives, swelling of the face, lips, or tongue    breathing problems    fever, chills    irregular heartbeat    pain when passing urine    seizures    stomach pain, cramps    unusual bleeding, bruising    unusually weak or tired  Side effects that usually do not require medical attention (report to your doctor or health care professional if they continue or are bothersome):    diarrhea    dizzy, drowsy    headache    nausea, vomiting    pain, swelling, irritation where injected    stomach upset    sweating  This list may not describe all possible side effects. Call your doctor for medical advice about side effects. You may report side effects to FDA at 8-784-IOY-2627.  NOTE:This sheet is a summary. It may not cover all possible information. If you have questions about  this medicine, talk to your doctor, pharmacist, or health care provider. Copyright  2016 Gold Standard

## 2018-04-11 ENCOUNTER — INFUSION THERAPY VISIT (OUTPATIENT)
Dept: INFUSION THERAPY | Facility: OTHER | Age: 58
End: 2018-04-11
Attending: FAMILY MEDICINE
Payer: MEDICARE

## 2018-04-11 VITALS
RESPIRATION RATE: 18 BRPM | HEIGHT: 70 IN | WEIGHT: 167.1 LBS | DIASTOLIC BLOOD PRESSURE: 74 MMHG | SYSTOLIC BLOOD PRESSURE: 100 MMHG | HEART RATE: 73 BPM | TEMPERATURE: 98.4 F | OXYGEN SATURATION: 92 % | BODY MASS INDEX: 23.92 KG/M2

## 2018-04-11 DIAGNOSIS — J15.9 COMMUNITY ACQUIRED BACTERIAL PNEUMONIA: Primary | ICD-10-CM

## 2018-04-11 PROCEDURE — 96365 THER/PROPH/DIAG IV INF INIT: CPT

## 2018-04-11 PROCEDURE — 25000128 H RX IP 250 OP 636: Performed by: FAMILY MEDICINE

## 2018-04-11 RX ORDER — CEFTRIAXONE SODIUM 2 G/50ML
2 INJECTION, SOLUTION INTRAVENOUS ONCE
Status: COMPLETED | OUTPATIENT
Start: 2018-04-11 | End: 2018-04-11

## 2018-04-11 RX ORDER — CEFTRIAXONE SODIUM 2 G/50ML
2 INJECTION, SOLUTION INTRAVENOUS ONCE
Status: CANCELLED | OUTPATIENT
Start: 2018-04-11 | End: 2018-04-11

## 2018-04-11 RX ADMIN — CEFTRIAXONE SODIUM 2 G: 2 INJECTION, SOLUTION INTRAVENOUS at 09:18

## 2018-04-11 NOTE — PROGRESS NOTES
Flushed PICC with 10 ml of NS, line flushed easily. Blood return noted. Patient left ambulatory AVS given.

## 2018-04-11 NOTE — PROGRESS NOTES
Patient is a 58 year old male here accompanied by self today for infusion of Rocephin per order of Dr Nayan Santillan. Patient identified with two identifiers, order verified, and verbal consent for today's infusion obtained from patient.         Patients PICC line in place. Flushes easily after good blood return. Dressing was placed yesterday, no concerns over integrity of dressing. No signs and symptoms of infection or infiltration.      0918: IV pump verified with Rocephin 2g dose, drug, and rate of administration. Infusion administered per protocol.  Patient tolerated infusion well, no signs or symptoms of adverse reaction noted.  Patient denies pain nor discomfort.     IV removed, catheter intact.  Site clean, dry and intact.  No signs or symptoms of infiltration or infection.  Covered with a sterile bandage, slight pressure applied for 30 seconds.  Pt instructed to leave bandage intact for a minimum of one hour, and to call with questions or concerns.  Copy of appointments, discharge instructions, and after visit summary (AVS) provided to patient.  Patient states understanding, discharged ambulatory.

## 2018-04-11 NOTE — MR AVS SNAPSHOT
After Visit Summary   4/11/2018    Ramo Berry    MRN: [id number]           Patient Information     Date Of Birth          1960        Visit Information        Provider Department      4/11/2018 9:00 AM HC [facility]F RM [facility]        Today's Diagnoses     Community acquired bacterial pneumonia    -  1      Care Instructions    See you as planned          Follow-ups after your visit        Your next 10 appointments already scheduled     Apr 12, 2018  8:30 AM CDT   Level 2 with HC INF RM 3302   [facility] [place] ([facility] [place] )    [address]  [place] [address]   [phone]            Apr 13, 2018 10:30 AM CDT   Level 2 with HC INF RM 3310   [facility] [place] ([facility] [place] )    [address]  [place] [address]   [phone]            May 14, 2018  9:45 AM CDT   (Arrive by 9:30 AM)   Office Visit with JUSTUS Santillan MD   [facility] [place] ([facility] [place] )    [address]  [place] [address]   [phone]           Bring a current list of meds and any records pertaining to this visit.  For Physicals, please bring immunization records and any forms needing to be filled out.  Please arrive 15 minutes early to complete paperwork and register.              Future tests that were ordered for you today     Open Future Orders        Priority Expected Expires Ordered    IR PICC Placement > 5 Yrs of Age Routine  4/10/2019 4/10/2018            Who to contact     If you have questions or need follow up information about today's clinic visit or your schedule please contact [facility] directly at [phone].  Normal or non-critical lab and imaging results will be communicated to you by MyChart, letter or phone within 4 business days after the clinic has received the results. If you do not hear from us within 7 days, please contact the clinic through MyChart or phone. If you have a  "critical or abnormal lab result, we will notify you by phone as soon as possible.  Submit refill requests through Neul or call your pharmacy and they will forward the refill request to us. Please allow 3 business days for your refill to be completed.          Additional Information About Your Visit        MyChart Information     Neul lets you send messages to your doctor, view your test results, renew your prescriptions, schedule appointments and more. To sign up, go to [email or web address] . Click on \"Log in\" on the left side of the screen, which will take you to the Welcome page. Then click on \"Sign up Now\" on the right side of the page.     You will be asked to enter the access code listed below, as well as some personal information. Please follow the directions to create your username and password.     Your access code is: [email or web address]  Expires: 2018 11:03 AM     Your access code will  in 90 days. If you need help or a new code, please call your [place] clinic or [phone].        Care EveryWhere ID     This is your Care EveryWhere ID. This could be used by other organizations to access your [place] medical records  [id number]        Your Vitals Were     Pulse Temperature Respirations Height Pulse Oximetry BMI (Body Mass Index)    89 98.4  F (36.9  C) (Tympanic) 18 1.778 m (5' 10\") 92% 23.98 kg/m2       Blood Pressure from Last 3 Encounters:   18 116/74   04/10/18 109/79   04/10/18 122/79    Weight from Last 3 Encounters:   18 75.8 kg (167 lb 1.6 oz)   18 76.4 kg (168 lb 8 oz)   18 75.2 kg (165 lb 12.8 oz)              Today, you had the following     No orders found for display       Primary Care Provider Office Phone # Fax #    R Nayan Santillan [phone]       [facility] [place] [address]        Equal Access to Services     CAITLYN SONI AH: Cindy Bustamante, brian diaz, arin grant" cole tovarisaacplacido sullivan'aan ah. So [facility] [phone].    ATENCIÓN: Si clementina mata, tiene a blackwell disposición servicios gratuitos de asistencia lingüística. Lizz al [phone].    We comply with applicable federal civil rights laws and [place] laws. We do not discriminate on the basis of race, color, national origin, age, disability, sex, sexual orientation, or gender identity.            Thank you!     Thank you for choosing [facility]  for your care. Our goal is always to provide you with excellent care. Hearing back from our patients is one way we can continue to improve our services. Please take a few minutes to complete the written survey that you may receive in the mail after your visit with us. Thank you!             Your Updated Medication List - Protect others around you: Learn how to safely use, store and throw away your medicines at www.disposemymeds.org.          This list is accurate as of 4/11/18  9:51 AM.  Always use your most recent med list.                   Brand Name Dispense Instructions for use Diagnosis    albuterol (2.5 MG/3ML) 0.083% neb solution     1 Box    Take 1 vial (2.5 mg) by nebulization every 4 hours as needed for shortness of breath / dyspnea        amLODIPine 10 MG tablet    NORVASC    90 tablet    Take 1 tablet (10 mg) by mouth daily    Benign essential hypertension       aspirin 325 MG tablet      Take 1 tablet by mouth every 4 hours as needed. For pain        hydrochlorothiazide 12.5 MG Tabs tablet     90 tablet    TAKE ONE TABLET BY MOUTH ONCE DAILY    Essential hypertension, benign       HYDROcodone-acetaminophen 5-325 MG per tablet    NORCO    20 tablet    Take 1 tablet by mouth every 6 hours as needed for severe pain maximum 6 tablet(s) per day    History of lung surgery       lisinopril 40 MG tablet    PRINIVIL/ZESTRIL    30 tablet    TAKE ONE TABLET BY MOUTH ONCE DAILY    Benign essential hypertension       metoprolol tartrate 25 MG tablet    LOPRESSOR    120  tablet    Take 2 tablets (50 mg) by mouth 2 times daily    Benign essential hypertension       nicotine 14 MG/24HR 24 hr patch    NICODERM CQ    30 patch    Place 1 patch onto the skin every 24 hours    Tobacco abuse       omeprazole 20 MG CR capsule    priLOSEC    120 capsule    Take 2 capsules (40 mg) by mouth daily    Gastroesophageal reflux disease without esophagitis       ROCEPHIN IV      Inject 2,000 mLs into the vein daily

## 2018-04-12 ENCOUNTER — TELEPHONE (OUTPATIENT)
Dept: ONCOLOGY | Facility: OTHER | Age: 58
End: 2018-04-12

## 2018-04-12 NOTE — TELEPHONE ENCOUNTER
Patient called this morning, alerting that he thinks he had an allergic reaction to Rocephin IV yesterday, noted that approximately 2hours after returning home yesterday, his face/lips became swollen and he was uncomfortable. Denies any difficulty breathing. Notes overnight the swelling came down. Reviewed status with unit supervisor and instructed patient to be seen in ER for eval, and that we would not treat with Rocephin until he is evaluated. Scheduled for 1 remaining infusion tomorrow, and is then scheduled with [facility] for follow up. Message sent to PCP Dr Nayan Santillan updating.

## 2018-04-16 DIAGNOSIS — Z98.890 HISTORY OF LUNG SURGERY: ICD-10-CM

## 2018-04-16 RX ORDER — HYDROCODONE BITARTRATE AND ACETAMINOPHEN 5; 325 MG/1; MG/1
1 TABLET ORAL EVERY 6 HOURS PRN
Qty: 20 TABLET | Refills: 0 | Status: SHIPPED | OUTPATIENT
Start: 2018-04-16 | End: 2018-04-24

## 2018-04-16 NOTE — TELEPHONE ENCOUNTER
norco      Last Written Prescription Date:  4/10/18  Last Fill Quantity: 20,   # refills: 0  Last Office Visit: 4/6/18  Future Office visit:    Next 5 appointments (look out 90 days)     May 14, 2018  9:45 AM CDT   (Arrive by 9:30 AM)   Office Visit with JUSTUS Santillan MD   [facility] [place] ([facility] )    [address]  [place] [address]   [phone]                   Routing refill request to provider for review/approval because:  Drug not on the FMG, UMP or  Health refill protocol or controlled substance

## 2018-04-17 ENCOUNTER — INFUSION THERAPY VISIT (OUTPATIENT)
Dept: INFUSION THERAPY | Facility: OTHER | Age: 58
End: 2018-04-17
Attending: FAMILY MEDICINE
Payer: MEDICARE

## 2018-04-17 VITALS
OXYGEN SATURATION: 97 % | WEIGHT: 163.5 LBS | SYSTOLIC BLOOD PRESSURE: 123 MMHG | DIASTOLIC BLOOD PRESSURE: 90 MMHG | TEMPERATURE: 96.6 F | BODY MASS INDEX: 23.41 KG/M2 | RESPIRATION RATE: 18 BRPM | HEIGHT: 70 IN | HEART RATE: 104 BPM

## 2018-04-17 DIAGNOSIS — J15.9 COMMUNITY ACQUIRED BACTERIAL PNEUMONIA: Primary | ICD-10-CM

## 2018-04-17 PROCEDURE — G0463 HOSPITAL OUTPT CLINIC VISIT: HCPCS | Mod: 25

## 2018-04-17 PROCEDURE — G0463 HOSPITAL OUTPT CLINIC VISIT: HCPCS

## 2018-04-17 NOTE — PROGRESS NOTES
Patient here for removal of power PICC under orders of Dr. Durham under the supervision of Janis Hayden Infectious Disease NP.   Insertion site of PICC line without s/sx infection.  Procedure explained  to patient.  Patient positioned in supine position.  Instructed to turn head away and keep arm still, insertion arm extended 45-90 degrees.   Sterile central line dressing change kit opened, extra pair of sterile gloves donned.  Sterile drape placed beneath patients arm.  Old dressing removed, alcohol used to remove stat lock.  New pair of sterile gloves donned, insertion site cleansed X 3 minutes with chlorhexidine.  Catheter grasped near insertion site and withdrawn keeping parallel to arm using firm gentle motion.  Insertion site covered with sterile gauze followed by occlusive dressing and wrapped with pressure dressing.  Patient instructed to leave on X 24 hours.  Instructed to notify provider with any s/sx infection including increased redness, swelling warmth or drainage, to avoid any strenuous exercises or do any heavy lifting for 24 hours.  Verbalizes understanding.  Patient tolerated procedure well.  PICC line measures 55cm.  Declined AVS this date.

## 2018-04-17 NOTE — PROGRESS NOTES
Faxed orders received per Janis Hayden CNP Infectious Disease, for PICC line to be removed. Orders sent to Dr. Nayan Santillan for co-signature. Order entered. [facility] updated to call patient to schedule.

## 2018-04-17 NOTE — PATIENT INSTRUCTIONS
We removed your PICC line today. Please leave bandage intact for 24hours. Call with any signs of infection (redness, warmth, drainage, swelling, fever).

## 2018-04-17 NOTE — TELEPHONE ENCOUNTER
Pt notified that the written RX is ready at the [facility]  registration to be picked up.

## 2018-04-17 NOTE — MR AVS SNAPSHOT
After Visit Summary   4/17/2018    Ramo Berry    MRN: [id number]           Patient Information     Date Of Birth          1960        Visit Information        Provider Department      4/17/2018 1:30 PM [facility]        Today's Diagnoses     Community acquired bacterial pneumonia    -  1      Care Instructions    We removed your PICC line today. Please leave bandage intact for 24hours. Call with any signs of infection (redness, warmth, drainage, swelling, fever).           Follow-ups after your visit        Your next 10 appointments already scheduled     May 14, 2018  9:45 AM CDT   (Arrive by 9:30 AM)   Office Visit with JUSTUS Santillan MD   [facility] ([facility] )    [address]  [facility] [address]   [phone]           Bring a current list of meds and any records pertaining to this visit.  For Physicals, please bring immunization records and any forms needing to be filled out.  Please arrive 15 minutes early to complete paperwork and register.              Who to contact     If you have questions or need follow up information about today's clinic visit or your schedule please contact [facility] directly at [phone].  Normal or non-critical lab and imaging results will be communicated to you by MyChart, letter or phone within 4 business days after the clinic has received the results. If you do not hear from us within 7 days, please contact the clinic through MyChart or phone. If you have a critical or abnormal lab result, we will notify you by phone as soon as possible.  Submit refill requests through [employer] or call your pharmacy and they will forward the refill request to us. Please allow 3 business days for your refill to be completed.          Additional Information About Your Visit        MyChart Information     [employer] lets you send messages to your doctor, view your test results, renew your  "prescriptions, schedule appointments and more. To sign up, go to [email or web address] . Click on \"Log in\" on the left side of the screen, which will take you to the Welcome page. Then click on \"Sign up Now\" on the right side of the page.     You will be asked to enter the access code listed below, as well as some personal information. Please follow the directions to create your username and password.     Your access code is: [email or web address]  Expires: 2018 11:03 AM     Your access code will  in 90 days. If you need help or a new code, please call your [place] clinic or [phone].        Care EveryWhere ID     This is your Care EveryWhere ID. This could be used by other organizations to access your [place] medical records  [id number]        Your Vitals Were     Pulse Temperature Respirations Height Pulse Oximetry BMI (Body Mass Index)    104 96.6  F (35.9  C) (Oral) 18 1.778 m (5' 10\") 97% 23.46 kg/m2       Blood Pressure from Last 3 Encounters:   18 123/90   18 100/74   04/10/18 109/79    Weight from Last 3 Encounters:   18 74.2 kg (163 lb 8 oz)   18 75.8 kg (167 lb 1.6 oz)   18 76.4 kg (168 lb 8 oz)              Today, you had the following     No orders found for display       Primary Care Provider Office Phone # Fax #    R Nayan Santillan [phone]       [facility] [address]        Equal Access to Services     CAITLYN SONI AH: Hadcharlotte Bustamante, brian diaz, arin garnt. So [facility] [phone].    ATENCIÓN: Si habla español, tiene a blackwell disposición servicios gratuitos de asistencia lingüística. Lizz al [phone].    We comply with applicable federal civil rights laws and [place] laws. We do not discriminate on the basis of race, color, national origin, age, disability, sex, sexual orientation, or gender identity.            Thank you!     Thank you for " choosing [facility]  for your care. Our goal is always to provide you with excellent care. Hearing back from our patients is one way we can continue to improve our services. Please take a few minutes to complete the written survey that you may receive in the mail after your visit with us. Thank you!             Your Updated Medication List - Protect others around you: Learn how to safely use, store and throw away your medicines at www.disposemymeds.org.          This list is accurate as of 4/17/18  1:46 PM.  Always use your most recent med list.                   Brand Name Dispense Instructions for use Diagnosis    albuterol (2.5 MG/3ML) 0.083% neb solution     1 Box    Take 1 vial (2.5 mg) by nebulization every 4 hours as needed for shortness of breath / dyspnea        amLODIPine 10 MG tablet    NORVASC    90 tablet    Take 1 tablet (10 mg) by mouth daily    Benign essential hypertension       aspirin 325 MG tablet      Take 1 tablet by mouth every 4 hours as needed. For pain        hydrochlorothiazide 12.5 MG Tabs tablet     90 tablet    TAKE ONE TABLET BY MOUTH ONCE DAILY    Essential hypertension, benign       HYDROcodone-acetaminophen 5-325 MG per tablet    NORCO    20 tablet    Take 1 tablet by mouth every 6 hours as needed for severe pain maximum 6 tablet(s) per day    History of lung surgery       lisinopril 40 MG tablet    PRINIVIL/ZESTRIL    30 tablet    TAKE ONE TABLET BY MOUTH ONCE DAILY    Benign essential hypertension       metoprolol tartrate 25 MG tablet    LOPRESSOR    120 tablet    Take 2 tablets (50 mg) by mouth 2 times daily    Benign essential hypertension       nicotine 14 MG/24HR 24 hr patch    NICODERM CQ    30 patch    Place 1 patch onto the skin every 24 hours    Tobacco abuse       omeprazole 20 MG CR capsule    priLOSEC    120 capsule    Take 2 capsules (40 mg) by mouth daily    Gastroesophageal reflux disease without esophagitis       ROCEPHIN IV      Inject 2,000 mLs into  the vein daily

## 2018-04-24 DIAGNOSIS — Z98.890 HISTORY OF LUNG SURGERY: ICD-10-CM

## 2018-04-24 RX ORDER — HYDROCODONE BITARTRATE AND ACETAMINOPHEN 5; 325 MG/1; MG/1
1 TABLET ORAL EVERY 6 HOURS PRN
Qty: 20 TABLET | Refills: 0 | Status: SHIPPED | OUTPATIENT
Start: 2018-04-24 | End: 2018-05-14

## 2018-04-24 NOTE — TELEPHONE ENCOUNTER
Patient notified her will get 1 more fill per Dr. Nayan Santillan. If symptoms have resolved. Patient is advise to see his lung doctor at [facility].    Patient notified script will be at      [facility]

## 2018-04-24 NOTE — TELEPHONE ENCOUNTER
norco      Last Written Prescription Date:  4/16/18  Last Fill Quantity: 20,   # refills: 0  Last Office Visit: 4/6/18  Future Office visit:    Next 5 appointments (look out 90 days)     May 14, 2018  9:45 AM CDT   (Arrive by 9:30 AM)   Office Visit with JUSTUS Santillan MD   [facility] [place] ([facility] )    [address]  [place] [address]   [phone]                   Routing refill request to provider for review/approval because:  Drug not on the FMG, UMP or  Health refill protocol or controlled substance

## 2018-05-14 ENCOUNTER — RADIANT APPOINTMENT (OUTPATIENT)
Dept: GENERAL RADIOLOGY | Facility: OTHER | Age: 58
End: 2018-05-14
Attending: FAMILY MEDICINE
Payer: MEDICARE

## 2018-05-14 ENCOUNTER — OFFICE VISIT (OUTPATIENT)
Dept: FAMILY MEDICINE | Facility: OTHER | Age: 58
End: 2018-05-14
Attending: FAMILY MEDICINE
Payer: MEDICARE

## 2018-05-14 VITALS
SYSTOLIC BLOOD PRESSURE: 136 MMHG | WEIGHT: 165.25 LBS | OXYGEN SATURATION: 96 % | DIASTOLIC BLOOD PRESSURE: 84 MMHG | TEMPERATURE: 97.4 F | BODY MASS INDEX: 23.71 KG/M2 | HEART RATE: 67 BPM

## 2018-05-14 DIAGNOSIS — J18.9 PNEUMONIA DUE TO INFECTIOUS ORGANISM, UNSPECIFIED LATERALITY, UNSPECIFIED PART OF LUNG: ICD-10-CM

## 2018-05-14 DIAGNOSIS — Z11.59 NEED FOR HEPATITIS C SCREENING TEST: Primary | ICD-10-CM

## 2018-05-14 LAB
BASOPHILS # BLD AUTO: 0.1 10E9/L (ref 0–0.2)
BASOPHILS NFR BLD AUTO: 1 %
DIFFERENTIAL METHOD BLD: NORMAL
EOSINOPHIL # BLD AUTO: 0.3 10E9/L (ref 0–0.7)
EOSINOPHIL NFR BLD AUTO: 3.9 %
ERYTHROCYTE [DISTWIDTH] IN BLOOD BY AUTOMATED COUNT: 14.2 % (ref 10–15)
HCT VFR BLD AUTO: 41.9 % (ref 40–53)
HGB BLD-MCNC: 13.3 G/DL (ref 13.3–17.7)
IMM GRANULOCYTES # BLD: 0 10E9/L (ref 0–0.4)
IMM GRANULOCYTES NFR BLD: 0.1 %
LYMPHOCYTES # BLD AUTO: 1.7 10E9/L (ref 0.8–5.3)
LYMPHOCYTES NFR BLD AUTO: 22.9 %
MCH RBC QN AUTO: 27.4 PG (ref 26.5–33)
MCHC RBC AUTO-ENTMCNC: 31.7 G/DL (ref 31.5–36.5)
MCV RBC AUTO: 86 FL (ref 78–100)
MONOCYTES # BLD AUTO: 0.7 10E9/L (ref 0–1.3)
MONOCYTES NFR BLD AUTO: 9.6 %
NEUTROPHILS # BLD AUTO: 4.5 10E9/L (ref 1.6–8.3)
NEUTROPHILS NFR BLD AUTO: 62.5 %
NRBC # BLD AUTO: 0 10*3/UL
NRBC BLD AUTO-RTO: 0 /100
PLATELET # BLD AUTO: 367 10E9/L (ref 150–450)
RBC # BLD AUTO: 4.85 10E12/L (ref 4.4–5.9)
WBC # BLD AUTO: 7.2 10E9/L (ref 4–11)

## 2018-05-14 PROCEDURE — 36415 COLL VENOUS BLD VENIPUNCTURE: CPT | Mod: ZL | Performed by: FAMILY MEDICINE

## 2018-05-14 PROCEDURE — G0463 HOSPITAL OUTPT CLINIC VISIT: HCPCS | Mod: 25

## 2018-05-14 PROCEDURE — G0463 HOSPITAL OUTPT CLINIC VISIT: HCPCS

## 2018-05-14 PROCEDURE — 71046 X-RAY EXAM CHEST 2 VIEWS: CPT | Mod: TC

## 2018-05-14 PROCEDURE — 99213 OFFICE O/P EST LOW 20 MIN: CPT | Performed by: FAMILY MEDICINE

## 2018-05-14 PROCEDURE — G0472 HEP C SCREEN HIGH RISK/OTHER: HCPCS | Mod: ZL | Performed by: FAMILY MEDICINE

## 2018-05-14 PROCEDURE — 85025 COMPLETE CBC W/AUTO DIFF WBC: CPT | Mod: ZL | Performed by: FAMILY MEDICINE

## 2018-05-14 ASSESSMENT — ANXIETY QUESTIONNAIRES
GAD7 TOTAL SCORE: 6
6. BECOMING EASILY ANNOYED OR IRRITABLE: SEVERAL DAYS
4. TROUBLE RELAXING: SEVERAL DAYS
1. FEELING NERVOUS, ANXIOUS, OR ON EDGE: SEVERAL DAYS
5. BEING SO RESTLESS THAT IT IS HARD TO SIT STILL: NOT AT ALL
IF YOU CHECKED OFF ANY PROBLEMS ON THIS QUESTIONNAIRE, HOW DIFFICULT HAVE THESE PROBLEMS MADE IT FOR YOU TO DO YOUR WORK, TAKE CARE OF THINGS AT HOME, OR GET ALONG WITH OTHER PEOPLE: SOMEWHAT DIFFICULT
7. FEELING AFRAID AS IF SOMETHING AWFUL MIGHT HAPPEN: NOT AT ALL
2. NOT BEING ABLE TO STOP OR CONTROL WORRYING: MORE THAN HALF THE DAYS
3. WORRYING TOO MUCH ABOUT DIFFERENT THINGS: SEVERAL DAYS

## 2018-05-14 ASSESSMENT — PAIN SCALES - GENERAL: PAINLEVEL: NO PAIN (0)

## 2018-05-14 NOTE — MR AVS SNAPSHOT
"              After Visit Summary   2018    Ramo Berry    MRN: [id number]           Patient Information     Date Of Birth          1960        Visit Information        Provider Department      2018 9:45 AM JUSTUS Santillan MD [facility]        Today's Diagnoses     Need for hepatitis C screening test    -  1    Pneumonia due to infectious organism, unspecified laterality, unspecified part of lung           Follow-ups after your visit        Who to contact     If you have questions or need follow up information about today's clinic visit or your schedule please contact [facility] directly at [phone].  Normal or non-critical lab and imaging results will be communicated to you by MyChart, letter or phone within 4 business days after the clinic has received the results. If you do not hear from us within 7 days, please contact the clinic through MyChart or phone. If you have a critical or abnormal lab result, we will notify you by phone as soon as possible.  Submit refill requests through [employer] or call your pharmacy and they will forward the refill request to us. Please allow 3 business days for your refill to be completed.          Additional Information About Your Visit        MyChart Information     [employer] lets you send messages to your doctor, view your test results, renew your prescriptions, schedule appointments and more. To sign up, go to [email or web address] . Click on \"Log in\" on the left side of the screen, which will take you to the Welcome page. Then click on \"Sign up Now\" on the right side of the page.     You will be asked to enter the access code listed below, as well as some personal information. Please follow the directions to create your username and password.     Your access code is: [email or web address]  Expires: 2018 11:03 AM     Your access code will  in 90 days. If you need help or a new code, please call your [facility] or " [phone].        Care EveryWhere ID     This is your Care EveryWhere ID. This could be used by other organizations to access your [place] medical records  [id number]        Your Vitals Were     Pulse Temperature Pulse Oximetry BMI (Body Mass Index)          67 97.4  F (36.3  C) (Tympanic) 96% 23.71 kg/m2         Blood Pressure from Last 3 Encounters:   05/14/18 136/84   04/17/18 123/90   04/11/18 100/74    Weight from Last 3 Encounters:   05/14/18 165 lb 4 oz (75 kg)   04/17/18 163 lb 8 oz (74.2 kg)   04/11/18 167 lb 1.6 oz (75.8 kg)              We Performed the Following     CBC with platelets differential     Hepatitis C antibody        Primary Care Provider Office Phone # Fax #    R Nyaan Santillan [phone] [address] [address]        Equal Access to Services     MADALYN SONI : Hadii aad ku hadasho Soomaali, waaxda luqadaha, qaybta kaalmada adeegyada, arin pate . So [facility] [phone].    ATENCIÓN: Si habla español, tiene a blackwell disposición servicios gratuitos de asistencia lingüística. Llame al [phone].    We comply with applicable federal civil rights laws and [place] laws. We do not discriminate on the basis of race, color, national origin, age, disability, sex, sexual orientation, or gender identity.            Thank you!     Thank you for choosing [facility]  for your care. Our goal is always to provide you with excellent care. Hearing back from our patients is one way we can continue to improve our services. Please take a few minutes to complete the written survey that you may receive in the mail after your visit with us. Thank you!             Your Updated Medication List - Protect others around you: Learn how to safely use, store and throw away your medicines at www.disposemymeds.org.          This list is accurate as of 5/14/18 10:06 AM.  Always use your most recent med list.                   Brand Name  Dispense Instructions for use Diagnosis    amLODIPine 10 MG tablet    NORVASC    90 tablet    Take 1 tablet (10 mg) by mouth daily    Benign essential hypertension       aspirin 325 MG tablet      Take 1 tablet by mouth every 4 hours as needed. For pain        hydrochlorothiazide 12.5 MG Tabs tablet     90 tablet    TAKE ONE TABLET BY MOUTH ONCE DAILY    Essential hypertension, benign       lisinopril 40 MG tablet    PRINIVIL/ZESTRIL    30 tablet    TAKE ONE TABLET BY MOUTH ONCE DAILY    Benign essential hypertension       metoprolol tartrate 25 MG tablet    LOPRESSOR    120 tablet    Take 2 tablets (50 mg) by mouth 2 times daily    Benign essential hypertension       nicotine 14 MG/24HR 24 hr patch    NICODERM CQ    30 patch    Place 1 patch onto the skin every 24 hours    Tobacco abuse       omeprazole 20 MG CR capsule    priLOSEC    120 capsule    Take 2 capsules (40 mg) by mouth daily    Gastroesophageal reflux disease without esophagitis

## 2018-05-14 NOTE — NURSING NOTE
"Chief Complaint   Patient presents with     Panel Management     Hep C Screen, TDAP(no miic)       Initial /84  Pulse 67  Temp 97.4  F (36.3  C) (Tympanic)  Wt 165 lb 4 oz (75 kg)  SpO2 96%  BMI 23.71 kg/m2 Estimated body mass index is 23.71 kg/(m^2) as calculated from the following:    Height as of 4/17/18: 5' 10\" (1.778 m).    Weight as of this encounter: 165 lb 4 oz (75 kg).  Medication Reconciliation: complete    Valentine Montenegro MA    "

## 2018-05-14 NOTE — PROGRESS NOTES
SUBJECTIVE:                                                    Ramo Berry is a 58 year old male who presents to clinic today for the following health issues:    Here for follow-up right-sided pleural effusion had chest tubes done down in [facility].  Does have his follow-up in June with them.  Also they noted a left-sided lesion and will follow up with that.  Hyperlipidemia Follow-Up      Rate your low fat/cholesterol diet?: fair    Taking statin?  No    Other lipid medications/supplements?:  none    Hypertension Follow-up      Outpatient blood pressures are being checked at home.  Results are high today when he got up. 170/102 rechecked a couple hours later 150/94    Low Salt Diet: low salt      Amount of exercise or physical activity: 2-3 days/week for an average of 15-30 minutes    Problems taking medications regularly: No    Medication side effects: none    Diet: low fat/cholesterol    He has a patch of numbness near where they did the chest tube.  No other acute change.  [facility]    Ramo Berry, 58 year old, male presents with   Chief Complaint   Patient presents with     Panel Management     Hep C Screen, TDAP(no miic)       PAST MEDICAL HISTORY:  Past Medical History:   Diagnosis Date     Anxiety state, unspecified 09/20/2012     Back Pain 09/20/2012     Cervical disc disease 09/20/2012     Chronic pain syndrome 09/20/2012     Community acquired bacterial pneumonia 3/27/2018     Essential hypertension, benign 09/20/2012     Loculated pleural effusion 03/17/2018    right       PAST SURGICAL HISTORY:  Past Surgical History:   Procedure Laterality Date     ARTHROSCOPY SHOULDER, OPEN BICEP TENODESIS REPAIR, COMBINED Right 4/21/2017    Procedure: COMBINED ARTHROSCOPY SHOULDER, OPEN BICEP TENODESIS REPAIR;;  Surgeon: Talha Farrell DO;  Location: [facility] OR     ARTHROSCOPY SHOULDER, OPEN ROTATOR CUFF REPAIR, COMBINED Right 4/21/2017    Procedure: COMBINED ARTHROSCOPY SHOULDER, OPEN ROTATOR  CUFF REPAIR;  RIGHT SHOULDER ARTHROSCOPY WITH Open  REPAIR OF ROTATOR CUFFand BICEP TENDODESIS;  Surgeon: aTlha Farrell DO;  Location: [facility] OR     C6 C7 cervical fusion       cataract extraction and lens implantation      Bilateral      cystoscopy with biopsies      hematuria     fusion L5 S1       L4 L5 spinal fusion       LUNG SURGERY Right 03/17/2018    Surgery at [facility] to remove infection. Started with pneumonia got worse.     MOUTH SURGERY       ORTHOPEDIC SURGERY Right 08/09/2016    rotator cuff surgery      THORACOTOMY  03/17/2018    right video assisted thoracoscopic surgery       MEDICATIONS:  Prior to Admission medications    Medication Sig Start Date End Date Taking? Authorizing Provider   amLODIPine (NORVASC) 10 MG tablet Take 1 tablet (10 mg) by mouth daily 11/7/17  Yes JUSTUS Santillan MD   aspirin 325 MG tablet Take 1 tablet by mouth every 4 hours as needed. For pain   Yes Reported, Patient   hydrochlorothiazide 12.5 MG TABS tablet TAKE ONE TABLET BY MOUTH ONCE DAILY 12/8/17  Yes JUSTUS Santillan MD   lisinopril (PRINIVIL/ZESTRIL) 40 MG tablet TAKE ONE TABLET BY MOUTH ONCE DAILY 2/23/18  Yes JUSTUS Santillan MD   metoprolol (LOPRESSOR) 25 MG tablet Take 2 tablets (50 mg) by mouth 2 times daily 6/1/17  Yes JUSTUS Santillan MD   omeprazole (PRILOSEC) 20 MG CR capsule Take 2 capsules (40 mg) by mouth daily 6/1/17  Yes JUSTUS Santillan MD   nicotine (NICODERM CQ) 14 MG/24HR 24 hr patch Place 1 patch onto the skin every 24 hours 4/6/18   JUSTUS Santillan MD       ALLERGIES:   No Known Allergies    ROS:  Constitutional, HEENT, cardiovascular, pulmonary, gi and gu systems are negative, except as otherwise noted.      EXAM:  /84  Pulse 67  Temp 97.4  F (36.3  C) (Tympanic)  Wt 165 lb 4 oz (75 kg)  SpO2 96%  BMI 23.71 kg/m2 Body mass index is 23.71 kg/(m^2).   GENERAL APPEARANCE: healthy, alert and no distress  RESP: lungs clear to auscultation - no rales, rhonchi or wheezes  CV: regular rates and  rhythm, normal S1 S2, no S3 or S4 and no murmur, click or rub  NEURO: Normal strength and tone, mentation intact and speech normal  Lab/ X-ray chest x-ray shows no blunting of the right costophrenic angle.  No new infiltrate.  Results for orders placed or performed in visit on 05/14/18 (from the past 24 hour(s))   CBC with platelets differential   Result Value Ref Range    WBC 7.2 4.0 - 11.0 10e9/L    RBC Count 4.85 4.4 - 5.9 10e12/L    Hemoglobin 13.3 13.3 - 17.7 g/dL    Hematocrit 41.9 40.0 - 53.0 %    MCV 86 78 - 100 fl    MCH 27.4 26.5 - 33.0 pg    MCHC 31.7 31.5 - 36.5 g/dL    RDW 14.2 10.0 - 15.0 %    Platelet Count 367 150 - 450 10e9/L    Diff Method Automated Method     % Neutrophils 62.5 %    % Lymphocytes 22.9 %    % Monocytes 9.6 %    % Eosinophils 3.9 %    % Basophils 1.0 %    % Immature Granulocytes 0.1 %    Nucleated RBCs 0 0 /100    Absolute Neutrophil 4.5 1.6 - 8.3 10e9/L    Absolute Lymphocytes 1.7 0.8 - 5.3 10e9/L    Absolute Monocytes 0.7 0.0 - 1.3 10e9/L    Absolute Eosinophils 0.3 0.0 - 0.7 10e9/L    Absolute Basophils 0.1 0.0 - 0.2 10e9/L    Abs Immature Granulocytes 0.0 0 - 0.4 10e9/L    Absolute Nucleated RBC 0.0        ASSESSMENT/PLAN:    ICD-10-CM    1. Need for hepatitis C screening test Z11.59 Hepatitis C antibody   2. Pneumonia due to infectious organism, unspecified laterality, unspecified part of lung J18.9 XR Chest 2 Views     CBC with platelets differential   Offered to test for hepatitis C and he consents and will call him with results.  Chest x-ray shows improvement of the right-sided abnormality had a significant pleural effusion.  CBC is stable.  He will keep his follow-up with pulmonary medicine in June.  He does have a patch of numbness near where the chest tubes were.  The tissue is well-healed.      BALJEET Santillan MD  May 14, 2018

## 2018-05-15 ASSESSMENT — ANXIETY QUESTIONNAIRES: GAD7 TOTAL SCORE: 6

## 2018-05-15 ASSESSMENT — PATIENT HEALTH QUESTIONNAIRE - PHQ9: SUM OF ALL RESPONSES TO PHQ QUESTIONS 1-9: 4

## 2018-05-16 LAB — HCV AB SERPL QL IA: NONREACTIVE

## 2018-05-25 ENCOUNTER — HOSPITAL ENCOUNTER (EMERGENCY)
Facility: HOSPITAL | Age: 58
Discharge: HOME | End: 2018-05-25
Attending: PHYSICIAN ASSISTANT | Admitting: PHYSICIAN ASSISTANT
Payer: MEDICARE

## 2018-05-25 VITALS
SYSTOLIC BLOOD PRESSURE: 160 MMHG | RESPIRATION RATE: 20 BRPM | DIASTOLIC BLOOD PRESSURE: 93 MMHG | OXYGEN SATURATION: 97 % | TEMPERATURE: 97.9 F | HEART RATE: 102 BPM

## 2018-05-25 DIAGNOSIS — M54.5 CHRONIC BILATERAL LOW BACK PAIN, WITH SCIATICA PRESENCE UNSPECIFIED: ICD-10-CM

## 2018-05-25 DIAGNOSIS — G89.29 CHRONIC BILATERAL LOW BACK PAIN, WITH SCIATICA PRESENCE UNSPECIFIED: ICD-10-CM

## 2018-05-25 PROCEDURE — A9270 NON-COVERED ITEM OR SERVICE: HCPCS | Mod: GY | Performed by: PHYSICIAN ASSISTANT

## 2018-05-25 PROCEDURE — G0463 HOSPITAL OUTPT CLINIC VISIT: HCPCS | Mod: 25

## 2018-05-25 PROCEDURE — 25000132 ZZH RX MED GY IP 250 OP 250 PS 637: Mod: GY | Performed by: PHYSICIAN ASSISTANT

## 2018-05-25 PROCEDURE — 25000128 H RX IP 250 OP 636: Performed by: PHYSICIAN ASSISTANT

## 2018-05-25 PROCEDURE — 96372 THER/PROPH/DIAG INJ SC/IM: CPT

## 2018-05-25 PROCEDURE — 99213 OFFICE O/P EST LOW 20 MIN: CPT | Performed by: PHYSICIAN ASSISTANT

## 2018-05-25 RX ORDER — CYCLOBENZAPRINE HCL 10 MG
5-10 TABLET ORAL 3 TIMES DAILY PRN
Qty: 20 TABLET | Refills: 0 | Status: SHIPPED | OUTPATIENT
Start: 2018-05-25 | End: 2018-05-31

## 2018-05-25 RX ORDER — HYDROCODONE BITARTRATE AND ACETAMINOPHEN 5; 325 MG/1; MG/1
1 TABLET ORAL ONCE
Status: COMPLETED | OUTPATIENT
Start: 2018-05-25 | End: 2018-05-25

## 2018-05-25 RX ORDER — KETOROLAC TROMETHAMINE 30 MG/ML
30 INJECTION, SOLUTION INTRAMUSCULAR; INTRAVENOUS ONCE
Status: COMPLETED | OUTPATIENT
Start: 2018-05-25 | End: 2018-05-25

## 2018-05-25 RX ORDER — PREDNISONE 20 MG/1
TABLET ORAL
Qty: 10 TABLET | Refills: 0 | Status: SHIPPED | OUTPATIENT
Start: 2018-05-25 | End: 2018-08-13

## 2018-05-25 RX ADMIN — HYDROCODONE BITARTRATE AND ACETAMINOPHEN 1 TABLET: 5; 325 TABLET ORAL at 11:50

## 2018-05-25 RX ADMIN — KETOROLAC TROMETHAMINE 30 MG: 30 INJECTION, SOLUTION INTRAMUSCULAR at 11:34

## 2018-05-25 ASSESSMENT — ENCOUNTER SYMPTOMS
CONSTITUTIONAL NEGATIVE: 1
WEAKNESS: 0
DIFFICULTY URINATING: 0
CARDIOVASCULAR NEGATIVE: 1
ROS GI COMMENTS: NO LOSS OF BOWEL CONTROL
GASTROINTESTINAL NEGATIVE: 1
FEVER: 0
CHILLS: 0
BACK PAIN: 1
PSYCHIATRIC NEGATIVE: 1
RESPIRATORY NEGATIVE: 1
NUMBNESS: 0

## 2018-05-25 NOTE — ED AVS SNAPSHOT
[facility]    [address] [address]    Phone:  [phone]                                       Ramo Berry   MRN: [id number]    Department:  [facility]   Date of Visit:  5/25/2018           After Visit Summary Signature Page     I have received my discharge instructions, and my questions have been answered. I have discussed any challenges I see with this plan with the nurse or doctor.    ..........................................................................................................................................  Patient/Patient Representative Signature      ..........................................................................................................................................  Patient Representative Print Name and Relationship to Patient    ..................................................               ................................................  Date                                            Time    ..........................................................................................................................................  Reviewed by Signature/Title    ...................................................              ..............................................  Date                                                            Time

## 2018-05-25 NOTE — ED TRIAGE NOTES
Pt presents today by self with c/o lower back pain flare up from doing yard work yesterday. Pt states he has a hx of back pain and surgeries. Pt tried to get in with primary today but his primary was out.
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
## 2018-05-25 NOTE — DISCHARGE INSTRUCTIONS
- Oral muscle relaxant/Flexeril.   - Prednisone orally for 5 days for pain/swelling. This tries to mimic a steroid injection into the back. Take this in the morning with breakfast.   - Heat packs and topical Capsaicin - may get hot, so test it on a smaller area of skin  - Be mindful of lifting and posture.  - As soon as tolerated, stretch.  - Consider chiropractor, massage, acupuncture.  - Check in with Dr Santillan Monday/Tuesday.     ** Must be rechecked sooner with: numbness in groin, loss of bowel or bladder function.

## 2018-06-03 DIAGNOSIS — K21.9 GASTROESOPHAGEAL REFLUX DISEASE WITHOUT ESOPHAGITIS: ICD-10-CM

## 2018-06-05 DIAGNOSIS — I10 BENIGN ESSENTIAL HYPERTENSION: ICD-10-CM

## 2018-06-05 NOTE — TELEPHONE ENCOUNTER
metoprolol (LOPRESSOR) 25 MG tablet    Last Written Prescription Date:  06/01/2017  Last Fill Quantity: 120,   # refills: 5  Last Office Visit: 05/14/2018  Future Office visit:       Routing refill request to provider for review/approval because:

## 2018-06-05 NOTE — TELEPHONE ENCOUNTER
Omeprazole  Last Written Prescription Date: 6/1/17  Last Fill Quantity: 120 # of Refills: 5  Last Office Visit: 5/14/18

## 2018-06-07 RX ORDER — METOPROLOL TARTRATE 25 MG/1
50 TABLET, FILM COATED ORAL 2 TIMES DAILY
Qty: 120 TABLET | Refills: 0 | Status: SHIPPED | OUTPATIENT
Start: 2018-06-07 | End: 2018-08-19

## 2018-06-12 DIAGNOSIS — I10 BENIGN ESSENTIAL HYPERTENSION: ICD-10-CM

## 2018-06-12 NOTE — TELEPHONE ENCOUNTER
norvasc      Last Written Prescription Date:  11/7/17  Last Fill Quantity: 90,   # refills: 1  Last Office Visit: 5/14/18  Future Office visit:   none

## 2018-06-13 RX ORDER — AMLODIPINE BESYLATE 10 MG/1
TABLET ORAL
Qty: 90 TABLET | Refills: 1 | Status: SHIPPED | OUTPATIENT
Start: 2018-06-13 | End: 2018-12-13

## 2018-07-14 DIAGNOSIS — I10 BENIGN ESSENTIAL HYPERTENSION: ICD-10-CM

## 2018-07-16 RX ORDER — METOPROLOL TARTRATE 25 MG/1
TABLET, FILM COATED ORAL
Qty: 180 TABLET | Refills: 1 | Status: SHIPPED | OUTPATIENT
Start: 2018-07-16 | End: 2018-12-13

## 2018-07-16 NOTE — TELEPHONE ENCOUNTER
metoprolol      Last Written Prescription Date:  6/7/18  Last Fill Quantity: 120,   # refills: 0  Last Office Visit: 5/14/18  Future Office visit:   none

## 2018-07-18 DIAGNOSIS — I10 BENIGN ESSENTIAL HYPERTENSION: ICD-10-CM

## 2018-07-18 NOTE — TELEPHONE ENCOUNTER
lisinopril (PRINIVIL/ZESTRIL) 40 MG tablet     Last Written Prescription Date:  05/14/2018  Last Fill Quantity: 30,   # refills: 5  Last Office Visit: 02/23/2018  Future Office visit:       Routing refill request to provider for review/approval because:

## 2018-07-19 RX ORDER — LISINOPRIL 40 MG/1
40 TABLET ORAL DAILY
Qty: 30 TABLET | Refills: 5 | Status: SHIPPED | OUTPATIENT
Start: 2018-07-19 | End: 2018-11-20

## 2018-08-13 ENCOUNTER — HOSPITAL ENCOUNTER (EMERGENCY)
Facility: HOSPITAL | Age: 58
Discharge: HOME | End: 2018-08-13
Attending: PHYSICIAN ASSISTANT | Admitting: PHYSICIAN ASSISTANT
Payer: MEDICARE

## 2018-08-13 VITALS
DIASTOLIC BLOOD PRESSURE: 83 MMHG | RESPIRATION RATE: 18 BRPM | SYSTOLIC BLOOD PRESSURE: 137 MMHG | HEART RATE: 89 BPM | OXYGEN SATURATION: 98 % | TEMPERATURE: 97.5 F

## 2018-08-13 DIAGNOSIS — G89.29 CHRONIC BILATERAL BACK PAIN, UNSPECIFIED BACK LOCATION: ICD-10-CM

## 2018-08-13 DIAGNOSIS — M54.9 CHRONIC BILATERAL BACK PAIN, UNSPECIFIED BACK LOCATION: ICD-10-CM

## 2018-08-13 PROCEDURE — G0463 HOSPITAL OUTPT CLINIC VISIT: HCPCS

## 2018-08-13 PROCEDURE — 99213 OFFICE O/P EST LOW 20 MIN: CPT | Performed by: PHYSICIAN ASSISTANT

## 2018-08-13 ASSESSMENT — ENCOUNTER SYMPTOMS
RESPIRATORY NEGATIVE: 1
NEUROLOGICAL NEGATIVE: 1
FEVER: 0
PSYCHIATRIC NEGATIVE: 1
MYALGIAS: 1
BACK PAIN: 1
CARDIOVASCULAR NEGATIVE: 1
FATIGUE: 0

## 2018-08-13 NOTE — ED TRIAGE NOTES
Pt presents today alone with c/o lower back pain, started last week. Was chain-sawing. States he has multiple plates and screws and rods in back, thinks something gave way and is now out of place. [place] like something popped put of place. Hard sometime standing up and sometimes cant sit. States hes tried to see his PCP but no available openings until September. Rates pain at 6. Pain fluctuates with movements.
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
## 2018-08-19 ENCOUNTER — APPOINTMENT (OUTPATIENT)
Dept: GENERAL RADIOLOGY | Facility: HOSPITAL | Age: 58
End: 2018-08-19
Attending: EMERGENCY MEDICINE
Payer: MEDICARE

## 2018-08-19 ENCOUNTER — HOSPITAL ENCOUNTER (EMERGENCY)
Facility: HOSPITAL | Age: 58
Discharge: HOME | End: 2018-08-19
Attending: EMERGENCY MEDICINE | Admitting: EMERGENCY MEDICINE
Payer: MEDICARE

## 2018-08-19 VITALS
OXYGEN SATURATION: 95 % | SYSTOLIC BLOOD PRESSURE: 161 MMHG | BODY MASS INDEX: 25.77 KG/M2 | HEIGHT: 70 IN | WEIGHT: 180 LBS | RESPIRATION RATE: 16 BRPM | TEMPERATURE: 98.4 F | DIASTOLIC BLOOD PRESSURE: 103 MMHG

## 2018-08-19 DIAGNOSIS — R07.89 CHEST WALL PAIN: ICD-10-CM

## 2018-08-19 DIAGNOSIS — G89.18 POSTOPERATIVE PAIN: ICD-10-CM

## 2018-08-19 LAB
ALBUMIN SERPL-MCNC: 3.8 G/DL (ref 3.4–5)
ALP SERPL-CCNC: 58 U/L (ref 40–150)
ALT SERPL W P-5'-P-CCNC: 23 U/L (ref 0–70)
ANION GAP SERPL CALCULATED.3IONS-SCNC: 4 MMOL/L (ref 3–14)
AST SERPL W P-5'-P-CCNC: 18 U/L (ref 0–45)
BASOPHILS # BLD AUTO: 0.1 10E9/L (ref 0–0.2)
BASOPHILS NFR BLD AUTO: 0.9 %
BILIRUB SERPL-MCNC: 0.2 MG/DL (ref 0.2–1.3)
BUN SERPL-MCNC: 18 MG/DL (ref 7–30)
CALCIUM SERPL-MCNC: 8.2 MG/DL (ref 8.5–10.1)
CHLORIDE SERPL-SCNC: 110 MMOL/L (ref 94–109)
CO2 SERPL-SCNC: 27 MMOL/L (ref 20–32)
CREAT SERPL-MCNC: 1.2 MG/DL (ref 0.66–1.25)
CRP SERPL-MCNC: 3.8 MG/L (ref 0–8)
D DIMER PPP DDU-MCNC: <200 NG/ML D-DU (ref 0–300)
DIFFERENTIAL METHOD BLD: ABNORMAL
EOSINOPHIL # BLD AUTO: 0.4 10E9/L (ref 0–0.7)
EOSINOPHIL NFR BLD AUTO: 6 %
ERYTHROCYTE [DISTWIDTH] IN BLOOD BY AUTOMATED COUNT: 17.3 % (ref 10–15)
ERYTHROCYTE [SEDIMENTATION RATE] IN BLOOD BY WESTERGREN METHOD: 10 MM/H (ref 0–20)
GFR SERPL CREATININE-BSD FRML MDRD: 62 ML/MIN/1.7M2
GLUCOSE SERPL-MCNC: 80 MG/DL (ref 70–99)
HCT VFR BLD AUTO: 42.3 % (ref 40–53)
HGB BLD-MCNC: 13.9 G/DL (ref 13.3–17.7)
IMM GRANULOCYTES # BLD: 0 10E9/L (ref 0–0.4)
IMM GRANULOCYTES NFR BLD: 0.1 %
LIPASE SERPL-CCNC: 95 U/L (ref 73–393)
LYMPHOCYTES # BLD AUTO: 1.7 10E9/L (ref 0.8–5.3)
LYMPHOCYTES NFR BLD AUTO: 24.8 %
MCH RBC QN AUTO: 27.6 PG (ref 26.5–33)
MCHC RBC AUTO-ENTMCNC: 32.9 G/DL (ref 31.5–36.5)
MCV RBC AUTO: 84 FL (ref 78–100)
MONOCYTES # BLD AUTO: 0.7 10E9/L (ref 0–1.3)
MONOCYTES NFR BLD AUTO: 10.5 %
NEUTROPHILS # BLD AUTO: 4 10E9/L (ref 1.6–8.3)
NEUTROPHILS NFR BLD AUTO: 57.7 %
NRBC # BLD AUTO: 0 10*3/UL
NRBC BLD AUTO-RTO: 0 /100
PLATELET # BLD AUTO: 301 10E9/L (ref 150–450)
POTASSIUM SERPL-SCNC: 4.3 MMOL/L (ref 3.4–5.3)
PROT SERPL-MCNC: 7.5 G/DL (ref 6.8–8.8)
RBC # BLD AUTO: 5.04 10E12/L (ref 4.4–5.9)
SODIUM SERPL-SCNC: 141 MMOL/L (ref 133–144)
TROPONIN I SERPL-MCNC: <0.015 UG/L (ref 0–0.04)
WBC # BLD AUTO: 7 10E9/L (ref 4–11)

## 2018-08-19 PROCEDURE — 99285 EMERGENCY DEPT VISIT HI MDM: CPT | Performed by: EMERGENCY MEDICINE

## 2018-08-19 PROCEDURE — 84484 ASSAY OF TROPONIN QUANT: CPT | Performed by: EMERGENCY MEDICINE

## 2018-08-19 PROCEDURE — 96361 HYDRATE IV INFUSION ADD-ON: CPT

## 2018-08-19 PROCEDURE — 85025 COMPLETE CBC W/AUTO DIFF WBC: CPT | Performed by: EMERGENCY MEDICINE

## 2018-08-19 PROCEDURE — 36415 COLL VENOUS BLD VENIPUNCTURE: CPT | Performed by: EMERGENCY MEDICINE

## 2018-08-19 PROCEDURE — 96374 THER/PROPH/DIAG INJ IV PUSH: CPT

## 2018-08-19 PROCEDURE — 71046 X-RAY EXAM CHEST 2 VIEWS: CPT | Mod: TC

## 2018-08-19 PROCEDURE — 85652 RBC SED RATE AUTOMATED: CPT | Performed by: EMERGENCY MEDICINE

## 2018-08-19 PROCEDURE — 86140 C-REACTIVE PROTEIN: CPT | Performed by: EMERGENCY MEDICINE

## 2018-08-19 PROCEDURE — 25000128 H RX IP 250 OP 636: Performed by: EMERGENCY MEDICINE

## 2018-08-19 PROCEDURE — 83690 ASSAY OF LIPASE: CPT | Performed by: EMERGENCY MEDICINE

## 2018-08-19 PROCEDURE — 93005 ELECTROCARDIOGRAM TRACING: CPT

## 2018-08-19 PROCEDURE — 80053 COMPREHEN METABOLIC PANEL: CPT | Performed by: EMERGENCY MEDICINE

## 2018-08-19 PROCEDURE — 85379 FIBRIN DEGRADATION QUANT: CPT | Performed by: EMERGENCY MEDICINE

## 2018-08-19 PROCEDURE — 99285 EMERGENCY DEPT VISIT HI MDM: CPT | Mod: 25

## 2018-08-19 PROCEDURE — 96375 TX/PRO/DX INJ NEW DRUG ADDON: CPT

## 2018-08-19 RX ORDER — HYDROCODONE BITARTRATE AND ACETAMINOPHEN 5; 325 MG/1; MG/1
1 TABLET ORAL 2 TIMES DAILY PRN
Qty: 10 TABLET | Refills: 0 | Status: SHIPPED | OUTPATIENT
Start: 2018-08-19 | End: 2019-01-01

## 2018-08-19 RX ORDER — HYDROMORPHONE HYDROCHLORIDE 1 MG/ML
0.5 INJECTION, SOLUTION INTRAMUSCULAR; INTRAVENOUS; SUBCUTANEOUS
Status: COMPLETED | OUTPATIENT
Start: 2018-08-19 | End: 2018-08-19

## 2018-08-19 RX ORDER — KETOROLAC TROMETHAMINE 30 MG/ML
30 INJECTION, SOLUTION INTRAMUSCULAR; INTRAVENOUS ONCE
Status: COMPLETED | OUTPATIENT
Start: 2018-08-19 | End: 2018-08-19

## 2018-08-19 RX ORDER — SODIUM CHLORIDE 9 MG/ML
INJECTION, SOLUTION INTRAVENOUS CONTINUOUS
Status: DISCONTINUED | OUTPATIENT
Start: 2018-08-19 | End: 2018-08-19 | Stop reason: HOSPADM

## 2018-08-19 RX ORDER — LIDOCAINE 40 MG/G
CREAM TOPICAL
Status: DISCONTINUED | OUTPATIENT
Start: 2018-08-19 | End: 2018-08-19 | Stop reason: HOSPADM

## 2018-08-19 RX ADMIN — SODIUM CHLORIDE: 9 INJECTION, SOLUTION INTRAVENOUS at 12:55

## 2018-08-19 RX ADMIN — KETOROLAC TROMETHAMINE 30 MG: 30 INJECTION, SOLUTION INTRAMUSCULAR at 12:59

## 2018-08-19 RX ADMIN — Medication 0.5 MG: at 12:55

## 2018-08-19 ASSESSMENT — ENCOUNTER SYMPTOMS
ABDOMINAL DISTENTION: 0
BACK PAIN: 1
DIZZINESS: 1
CHEST TIGHTNESS: 1
SHORTNESS OF BREATH: 1
LIGHT-HEADEDNESS: 1
FATIGUE: 1
ACTIVITY CHANGE: 1

## 2018-08-19 NOTE — ED NOTES
"The patient presents with right chest pain under the breast radiating through to the back, reporting it is by his posterior \"lung scar\" where he had an empyema with pneumonia and chest tubes in March. The patient never had total resolution to the pain since the illness and the pain has been increasing over the past 3 weeks and worsening last night, denies recent URI or cough. Reports onset of dizziness and feeling \"off balance\" for an hour. States normal oral intake. Denies difficulty breathing but reports increase in the pain with deep breathing.  "

## 2018-08-19 NOTE — ED NOTES
The patient is ready for discharge. Verbalized understanding of follow up instructions. Sent home with a paper prescription for pain medication to be filled. Reports improvement in his chest and back pain and ability to take a deep breath on discharge.

## 2018-08-19 NOTE — DISCHARGE INSTRUCTIONS
Ramo,  Happy to report that you continue to heal up after the surgery you had in early March.  The lung is clearing the residual infection and scar tissue as is the inside surface of the chest wall.  This type of flare up may go on for months.  Hopefully, getting this positive news will relax you and give you a positive outlook and allow you to cope better with the pain.  If the pain maintains at a high level, then Dr. Santillan may be able to help.  Good luck!

## 2018-08-19 NOTE — ED AVS SNAPSHOT
[facility]    [address] [address]    Phone:  [phone]                                       Ramo Berry   MRN: [id number]    Department:  [facility]   Date of Visit:  8/19/2018           After Visit Summary Signature Page     I have received my discharge instructions, and my questions have been answered. I have discussed any challenges I see with this plan with the nurse or doctor.    ..........................................................................................................................................  Patient/Patient Representative Signature      ..........................................................................................................................................  Patient Representative Print Name and Relationship to Patient    ..................................................               ................................................  Date                                            Time    ..........................................................................................................................................  Reviewed by Signature/Title    ...................................................              ..............................................  Date                                                            Time

## 2018-08-19 NOTE — ED AVS SNAPSHOT
[facility]    [address] [address]    Phone:  [phone]                                       Ramo Berry   MRN: [id number]    Department:  [facility]   Date of Visit:  8/19/2018           Patient Information     Date Of Birth          1960        Your diagnoses for this visit were:     Chest wall pain     Postoperative pain        You were seen by Perfecto Barker MD.      Follow-up Information     Follow up with JUSTUS Santillan MD.    Specialty:  Family Practice    Why:  As needed    Contact information:    [address] [facility] [address]  [phone]          Discharge Instructions       Ramo,  Happy to report that you continue to heal up after the surgery you had in early March.  The lung is clearing the residual infection and scar tissue as is the inside surface of the chest wall.  This type of flare up may go on for months.  Hopefully, getting this positive news will relax you and give you a positive outlook and allow you to cope better with the pain.  If the pain maintains at a high level, then Dr. Santillan may be able to help.  Good luck!       Review of your medicines      START taking        Dose / Directions Last dose taken    HYDROcodone-acetaminophen 5-325 MG per tablet   Commonly known as:  NORCO   Dose:  1 tablet   Quantity:  10 tablet        Take 1 tablet by mouth 2 times daily as needed for severe pain   Refills:  0          Our records show that you are taking the medicines listed below. If these are incorrect, please call your family doctor or clinic.        Dose / Directions Last dose taken    amLODIPine 10 MG tablet   Commonly known as:  NORVASC   Quantity:  90 tablet        TAKE ONE TABLET BY MOUTH ONCE DAILY   Refills:  1        aspirin 325 MG tablet   Dose:  1 tablet        Take 1 tablet by mouth every 4 hours as needed. For pain   Refills:  0        hydrochlorothiazide 12.5 MG Tabs tablet   Quantity:  90 tablet        TAKE  ONE TABLET BY MOUTH ONCE DAILY   Refills:  2        lisinopril 40 MG tablet   Commonly known as:  PRINIVIL/ZESTRIL   Dose:  40 mg   Quantity:  30 tablet        Take 1 tablet (40 mg) by mouth daily   Refills:  5        metoprolol tartrate 25 MG tablet   Commonly known as:  LOPRESSOR   Quantity:  180 tablet        TAKE TWO TABLETS BY MOUTH TWICE DAILY   Refills:  1        omeprazole 20 MG CR capsule   Commonly known as:  priLOSEC   Quantity:  120 capsule        TAKE TWO CAPSULES BY MOUTH ONCE DAILY   Refills:  5                Information about OPIOIDS     PRESCRIPTION OPIOIDS: WHAT YOU NEED TO KNOW   We gave you an opioid (narcotic) pain medicine. It is important to manage your pain, but opioids are not always the best choice. You should first try all the other options your care team gave you. Take this medicine for as short a time (and as few doses) as possible.    Some activities can increase your pain, such as bandage changes or therapy sessions. It may help to take your pain medicine 30 to 60 minutes before these activities. Reduce your stress by getting enough sleep, working on hobbies you enjoy and practicing relaxation or meditation. Talk to your care team about ways to manage your pain beyond prescription opioids.    These medicines have risks:    DO NOT drive when on new or higher doses of pain medicine. These medicines can affect your alertness and reaction times, and you could be arrested for driving under the influence (DUI). If you need to use opioids long-term, talk to your care team about driving.    DO NOT operate heavy machinery    DO NOT do any other dangerous activities while taking these medicines.    DO NOT drink any alcohol while taking these medicines.     If the opioid prescribed includes acetaminophen, DO NOT take with any other medicines that contain acetaminophen. Read all labels carefully. Look for the word  acetaminophen  or  Tylenol.  Ask your pharmacist if you have questions or are  unsure.    You can get addicted to pain medicines, especially if you have a history of addiction (chemical, alcohol or substance dependence). Talk to your care team about ways to reduce this risk.    All opioids tend to cause constipation. Drink plenty of water and eat foods that have a lot of fiber, such as fruits, vegetables, prune juice, apple juice and high-fiber cereal. Take a laxative (Miralax, milk of magnesia, Colace, Senna) if you don t move your bowels at least every other day. Other side effects include upset stomach, sleepiness, dizziness, throwing up, tolerance (needing more of the medicine to have the same effect), physical dependence and slowed breathing.    Store your pills in a secure place, locked if possible. We will not replace any lost or stolen medicine. If you don t finish your medicine, please throw away (dispose) as directed by your pharmacist. The [place] [employer]ution [employer] has more information about safe disposal: https://[email or web address].us/living-green/managing-unwanted-medications        Prescriptions were sent or printed at these locations (1 Prescription)                   Other Prescriptions                Printed at Department/Unit printer (1 of 1)         HYDROcodone-acetaminophen (NORCO) 5-325 MG per tablet                Procedures and tests performed during your visit     CBC with platelets differential    CRP inflammation    Comprehensive metabolic panel    D-Dimer ([facility])    Erythrocyte sedimentation rate auto    Lipase    Peripheral IV catheter    Troponin I    XR Chest 2 Views      Orders Needing Specimen Collection     None      Pending Results     No orders found from 8/17/2018 to 8/20/2018.            Pending Culture Results     No orders found from 8/17/2018 to 8/20/2018.            Thank you for choosing [facility]       Thank you for choosing [facility] for your care. Our goal is always to provide you with excellent care. Hearing back from our patients is one way  "we can continue to improve our services. Please take a few minutes to complete the written survey that you may receive in the mail after you visit with us. Thank you!        [facility] Information     [employer] lets you send messages to your doctor, view your test results, renew your prescriptions, schedule appointments and more. To sign up, go to [email or web address] . Click on \"Log in\" on the left side of the screen, which will take you to the Welcome page. Then click on \"Sign up Now\" on the right side of the page.     You will be asked to enter the access code listed below, as well as some personal information. Please follow the directions to create your username and password.     Your access code is: [email or web address]  Expires: 2018  2:00 PM     Your access code will  in 90 days. If you need help or a new code, please call your [facility] clinic or [phone].        Care EveryWhere ID     This is your Care EveryWhere ID. This could be used by other organizations to access your [facility] medical records  [id number]        Equal Access to Services     [facility] : Cindy Bustamante, brian diaz, arin grant. So [facility] [phone].    ATENCIÓN: Si habla español, tiene a blackwell disposición servicios gratuitos de asistencia lingüística. Lizz al [phone].    We comply with applicable federal civil rights laws and [place] laws. We do not discriminate on the basis of race, color, national origin, age, disability, sex, sexual orientation, or gender identity.            After Visit Summary       This is your record. Keep this with you and show to your community pharmacist(s) and doctor(s) at your next visit.                  "
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
## 2018-12-13 DIAGNOSIS — I10 BENIGN ESSENTIAL HYPERTENSION: ICD-10-CM

## 2018-12-13 RX ORDER — METOPROLOL TARTRATE 25 MG/1
TABLET, FILM COATED ORAL
Qty: 180 TABLET | Refills: 0 | Status: SHIPPED | OUTPATIENT
Start: 2018-12-13 | End: 2019-01-01

## 2018-12-13 NOTE — TELEPHONE ENCOUNTER
Metoprolol last filled on 7.16.18 #180 with 1 refill. Last office visit on 5.14.18. Pended.Thank you.

## 2019-01-01 ENCOUNTER — OFFICE VISIT (OUTPATIENT)
Dept: INTERNAL MEDICINE | Facility: OTHER | Age: 59
End: 2019-01-01
Attending: INTERNAL MEDICINE
Payer: MEDICARE

## 2019-01-01 ENCOUNTER — TELEPHONE (OUTPATIENT)
Dept: FAMILY MEDICINE | Facility: OTHER | Age: 59
End: 2019-01-01

## 2019-01-01 ENCOUNTER — TELEPHONE (OUTPATIENT)
Dept: INTERNAL MEDICINE | Facility: OTHER | Age: 59
End: 2019-01-01

## 2019-01-01 ENCOUNTER — TELEPHONE (OUTPATIENT)
Dept: ORTHOPEDICS | Facility: OTHER | Age: 59
End: 2019-01-01

## 2019-01-01 ENCOUNTER — HOSPITAL ENCOUNTER (EMERGENCY)
Facility: OTHER | Age: 59
Discharge: HOME | End: 2019-10-25
Attending: EMERGENCY MEDICINE | Admitting: EMERGENCY MEDICINE
Payer: MEDICARE

## 2019-01-01 ENCOUNTER — OFFICE VISIT (OUTPATIENT)
Dept: ORTHOPEDICS | Facility: OTHER | Age: 59
End: 2019-01-01
Attending: ORTHOPAEDIC SURGERY
Payer: MEDICARE

## 2019-01-01 ENCOUNTER — APPOINTMENT (OUTPATIENT)
Dept: CT IMAGING | Facility: HOSPITAL | Age: 59
DRG: 057 | End: 2019-01-01
Attending: FAMILY MEDICINE
Payer: MEDICARE

## 2019-01-01 ENCOUNTER — APPOINTMENT (OUTPATIENT)
Dept: GENERAL RADIOLOGY | Facility: HOSPITAL | Age: 59
End: 2019-01-01
Payer: MEDICARE

## 2019-01-01 ENCOUNTER — TRANSFERRED RECORDS (OUTPATIENT)
Dept: HEALTH INFORMATION MANAGEMENT | Facility: CLINIC | Age: 59
End: 2019-01-01

## 2019-01-01 ENCOUNTER — APPOINTMENT (OUTPATIENT)
Dept: CT IMAGING | Facility: HOSPITAL | Age: 59
End: 2019-01-01
Payer: MEDICARE

## 2019-01-01 ENCOUNTER — HOSPITAL ENCOUNTER (EMERGENCY)
Facility: HOSPITAL | Age: 59
Discharge: HOME | End: 2019-01-24
Attending: INTERNAL MEDICINE | Admitting: INTERNAL MEDICINE
Payer: MEDICARE

## 2019-01-01 ENCOUNTER — HOSPITAL ENCOUNTER (INPATIENT)
Facility: HOSPITAL | Age: 59
LOS: 1 days | Discharge: ANOTHER HOSPITAL | DRG: 057 | End: 2019-10-31
Attending: FAMILY MEDICINE | Admitting: INTERNAL MEDICINE
Payer: MEDICARE

## 2019-01-01 ENCOUNTER — HOSPITAL ENCOUNTER (OUTPATIENT)
Dept: MRI IMAGING | Facility: OTHER | Age: 59
End: 2019-07-05
Attending: PHYSICIAN ASSISTANT
Payer: MEDICARE

## 2019-01-01 ENCOUNTER — APPOINTMENT (OUTPATIENT)
Dept: OCCUPATIONAL THERAPY | Facility: HOSPITAL | Age: 59
DRG: 057 | End: 2019-01-01
Payer: MEDICARE

## 2019-01-01 ENCOUNTER — HOSPITAL ENCOUNTER (EMERGENCY)
Facility: HOSPITAL | Age: 59
Discharge: HOME | End: 2019-02-23
Admitting: INTERNAL MEDICINE
Payer: MEDICARE

## 2019-01-01 ENCOUNTER — HOSPITAL ENCOUNTER (EMERGENCY)
Facility: HOSPITAL | Age: 59
Discharge: HOME | End: 2019-04-14
Attending: PHYSICIAN ASSISTANT | Admitting: PHYSICIAN ASSISTANT
Payer: MEDICARE

## 2019-01-01 ENCOUNTER — OFFICE VISIT (OUTPATIENT)
Dept: FAMILY MEDICINE | Facility: OTHER | Age: 59
End: 2019-01-01
Attending: FAMILY MEDICINE
Payer: MEDICARE

## 2019-01-01 ENCOUNTER — HOSPITAL ENCOUNTER (OUTPATIENT)
Dept: GENERAL RADIOLOGY | Facility: OTHER | Age: 59
Discharge: HOME | End: 2019-07-05
Attending: ORTHOPAEDIC SURGERY | Admitting: FAMILY MEDICINE
Payer: MEDICARE

## 2019-01-01 ENCOUNTER — ANCILLARY PROCEDURE (OUTPATIENT)
Dept: GENERAL RADIOLOGY | Facility: OTHER | Age: 59
End: 2019-01-01
Attending: ORTHOPAEDIC SURGERY
Payer: MEDICARE

## 2019-01-01 ENCOUNTER — TELEPHONE (OUTPATIENT)
Dept: NEUROLOGY | Facility: CLINIC | Age: 59
End: 2019-01-01

## 2019-01-01 ENCOUNTER — APPOINTMENT (OUTPATIENT)
Dept: CT IMAGING | Facility: OTHER | Age: 59
End: 2019-01-01
Attending: EMERGENCY MEDICINE
Payer: MEDICARE

## 2019-01-01 VITALS
DIASTOLIC BLOOD PRESSURE: 70 MMHG | HEART RATE: 78 BPM | TEMPERATURE: 96.4 F | OXYGEN SATURATION: 98 % | RESPIRATION RATE: 20 BRPM | SYSTOLIC BLOOD PRESSURE: 134 MMHG

## 2019-01-01 VITALS
RESPIRATION RATE: 16 BRPM | SYSTOLIC BLOOD PRESSURE: 144 MMHG | DIASTOLIC BLOOD PRESSURE: 83 MMHG | TEMPERATURE: 96 F | OXYGEN SATURATION: 100 %

## 2019-01-01 VITALS
SYSTOLIC BLOOD PRESSURE: 142 MMHG | HEIGHT: 70 IN | TEMPERATURE: 97.7 F | DIASTOLIC BLOOD PRESSURE: 80 MMHG | BODY MASS INDEX: 24.48 KG/M2 | HEART RATE: 73 BPM | WEIGHT: 171 LBS | OXYGEN SATURATION: 98 %

## 2019-01-01 VITALS
WEIGHT: 175 LBS | HEART RATE: 69 BPM | TEMPERATURE: 97.2 F | DIASTOLIC BLOOD PRESSURE: 70 MMHG | HEIGHT: 70 IN | OXYGEN SATURATION: 97 % | SYSTOLIC BLOOD PRESSURE: 130 MMHG | BODY MASS INDEX: 25.05 KG/M2

## 2019-01-01 VITALS
WEIGHT: 175 LBS | RESPIRATION RATE: 12 BRPM | HEART RATE: 81 BPM | BODY MASS INDEX: 25.05 KG/M2 | TEMPERATURE: 98.5 F | DIASTOLIC BLOOD PRESSURE: 104 MMHG | SYSTOLIC BLOOD PRESSURE: 146 MMHG | OXYGEN SATURATION: 95 % | HEIGHT: 70 IN

## 2019-01-01 VITALS
RESPIRATION RATE: 18 BRPM | BODY MASS INDEX: 23.51 KG/M2 | HEIGHT: 69 IN | WEIGHT: 158.73 LBS | TEMPERATURE: 97.9 F | OXYGEN SATURATION: 96 % | SYSTOLIC BLOOD PRESSURE: 162 MMHG | HEART RATE: 88 BPM | DIASTOLIC BLOOD PRESSURE: 100 MMHG

## 2019-01-01 VITALS
OXYGEN SATURATION: 97 % | SYSTOLIC BLOOD PRESSURE: 173 MMHG | RESPIRATION RATE: 16 BRPM | TEMPERATURE: 99 F | HEART RATE: 95 BPM | DIASTOLIC BLOOD PRESSURE: 108 MMHG

## 2019-01-01 VITALS
DIASTOLIC BLOOD PRESSURE: 116 MMHG | HEART RATE: 74 BPM | TEMPERATURE: 98.4 F | SYSTOLIC BLOOD PRESSURE: 185 MMHG | BODY MASS INDEX: 24.32 KG/M2 | WEIGHT: 169.9 LBS | HEIGHT: 70 IN | RESPIRATION RATE: 16 BRPM | OXYGEN SATURATION: 92 %

## 2019-01-01 VITALS
HEART RATE: 60 BPM | OXYGEN SATURATION: 98 % | WEIGHT: 171 LBS | SYSTOLIC BLOOD PRESSURE: 150 MMHG | BODY MASS INDEX: 24.48 KG/M2 | DIASTOLIC BLOOD PRESSURE: 96 MMHG | HEIGHT: 70 IN

## 2019-01-01 VITALS
HEART RATE: 72 BPM | TEMPERATURE: 98.4 F | BODY MASS INDEX: 25.03 KG/M2 | RESPIRATION RATE: 20 BRPM | SYSTOLIC BLOOD PRESSURE: 146 MMHG | DIASTOLIC BLOOD PRESSURE: 80 MMHG | WEIGHT: 174.44 LBS

## 2019-01-01 DIAGNOSIS — K21.9 GASTROESOPHAGEAL REFLUX DISEASE WITHOUT ESOPHAGITIS: ICD-10-CM

## 2019-01-01 DIAGNOSIS — M25.511 RIGHT SHOULDER PAIN, UNSPECIFIED CHRONICITY: Primary | ICD-10-CM

## 2019-01-01 DIAGNOSIS — Z98.1 S/P LUMBAR SPINAL FUSION: ICD-10-CM

## 2019-01-01 DIAGNOSIS — M54.50 CHRONIC LOW BACK PAIN WITHOUT SCIATICA, UNSPECIFIED BACK PAIN LATERALITY: ICD-10-CM

## 2019-01-01 DIAGNOSIS — Z98.1 S/P CERVICAL SPINAL FUSION: ICD-10-CM

## 2019-01-01 DIAGNOSIS — G89.21 CHRONIC PAIN DUE TO TRAUMA: Primary | ICD-10-CM

## 2019-01-01 DIAGNOSIS — M54.40 CHRONIC MIDLINE LOW BACK PAIN WITH SCIATICA, SCIATICA LATERALITY UNSPECIFIED: ICD-10-CM

## 2019-01-01 DIAGNOSIS — M79.641 BILATERAL HAND PAIN: ICD-10-CM

## 2019-01-01 DIAGNOSIS — G89.29 CHRONIC MIDLINE LOW BACK PAIN WITH SCIATICA, SCIATICA LATERALITY UNSPECIFIED: ICD-10-CM

## 2019-01-01 DIAGNOSIS — Z72.0 TOBACCO USE: ICD-10-CM

## 2019-01-01 DIAGNOSIS — S46.011D ROTATOR CUFF STRAIN, RIGHT, SUBSEQUENT ENCOUNTER: Primary | ICD-10-CM

## 2019-01-01 DIAGNOSIS — R10.11 ABDOMINAL PAIN, RIGHT UPPER QUADRANT: ICD-10-CM

## 2019-01-01 DIAGNOSIS — I15.9 SECONDARY HYPERTENSION: ICD-10-CM

## 2019-01-01 DIAGNOSIS — I10 BENIGN ESSENTIAL HYPERTENSION: ICD-10-CM

## 2019-01-01 DIAGNOSIS — R10.13 ABDOMINAL PAIN, EPIGASTRIC: ICD-10-CM

## 2019-01-01 DIAGNOSIS — G89.29 CHRONIC NECK PAIN: ICD-10-CM

## 2019-01-01 DIAGNOSIS — G89.29 CHRONIC RIGHT SHOULDER PAIN: ICD-10-CM

## 2019-01-01 DIAGNOSIS — K31.89 GASTRIC DISTENTION: ICD-10-CM

## 2019-01-01 DIAGNOSIS — Z12.5 SCREENING PSA (PROSTATE SPECIFIC ANTIGEN): ICD-10-CM

## 2019-01-01 DIAGNOSIS — M79.642 BILATERAL HAND PAIN: ICD-10-CM

## 2019-01-01 DIAGNOSIS — I10 BENIGN ESSENTIAL HYPERTENSION: Primary | ICD-10-CM

## 2019-01-01 DIAGNOSIS — M62.81 GENERALIZED MUSCLE WEAKNESS: Primary | ICD-10-CM

## 2019-01-01 DIAGNOSIS — F41.9 CHRONIC ANXIETY: ICD-10-CM

## 2019-01-01 DIAGNOSIS — M25.511 CHRONIC RIGHT SHOULDER PAIN: ICD-10-CM

## 2019-01-01 DIAGNOSIS — M54.2 CHRONIC NECK PAIN: ICD-10-CM

## 2019-01-01 DIAGNOSIS — E78.2 MIXED HYPERLIPIDEMIA: ICD-10-CM

## 2019-01-01 DIAGNOSIS — G89.29 CHRONIC LOW BACK PAIN WITHOUT SCIATICA, UNSPECIFIED BACK PAIN LATERALITY: ICD-10-CM

## 2019-01-01 DIAGNOSIS — M54.40 CHRONIC MIDLINE LOW BACK PAIN WITH SCIATICA, SCIATICA LATERALITY UNSPECIFIED: Primary | ICD-10-CM

## 2019-01-01 DIAGNOSIS — Z23 NEED FOR INFLUENZA VACCINATION: Primary | ICD-10-CM

## 2019-01-01 DIAGNOSIS — M54.50 RECURRENT LOW BACK PAIN: ICD-10-CM

## 2019-01-01 DIAGNOSIS — M25.511 RIGHT SHOULDER PAIN, UNSPECIFIED CHRONICITY: ICD-10-CM

## 2019-01-01 DIAGNOSIS — M54.16 LUMBAR RADICULOPATHY: Primary | ICD-10-CM

## 2019-01-01 DIAGNOSIS — Z18.9 EMBEDDED FOREIGN BODY: ICD-10-CM

## 2019-01-01 DIAGNOSIS — F12.90 MARIJUANA USE: ICD-10-CM

## 2019-01-01 DIAGNOSIS — T78.3XXA ANGIOEDEMA, INITIAL ENCOUNTER: ICD-10-CM

## 2019-01-01 DIAGNOSIS — Z53.9 ERRONEOUS ENCOUNTER--DISREGARD: Primary | ICD-10-CM

## 2019-01-01 DIAGNOSIS — K31.84 GASTROPARESIS: ICD-10-CM

## 2019-01-01 DIAGNOSIS — M54.9 BACK PAIN: ICD-10-CM

## 2019-01-01 DIAGNOSIS — R26.2 INABILITY TO WALK: ICD-10-CM

## 2019-01-01 DIAGNOSIS — G89.29 CHRONIC MIDLINE LOW BACK PAIN WITH SCIATICA, SCIATICA LATERALITY UNSPECIFIED: Primary | ICD-10-CM

## 2019-01-01 DIAGNOSIS — M62.81 GENERALIZED MUSCLE WEAKNESS: ICD-10-CM

## 2019-01-01 DIAGNOSIS — M51.9 LUMBAR DISC DISORDER: ICD-10-CM

## 2019-01-01 DIAGNOSIS — K21.00 GASTROESOPHAGEAL REFLUX DISEASE WITH ESOPHAGITIS: Primary | ICD-10-CM

## 2019-01-01 DIAGNOSIS — R29.6 RECURRENT FALLS: ICD-10-CM

## 2019-01-01 LAB
ALBUMIN SERPL-MCNC: 3.3 G/DL (ref 3.4–5)
ALBUMIN SERPL-MCNC: 3.7 G/DL (ref 3.4–5)
ALBUMIN SERPL-MCNC: 4 G/DL (ref 3.5–5.7)
ALBUMIN SERPL-MCNC: 4.3 G/DL (ref 3.4–5)
ALBUMIN SERPL-MCNC: 4.6 G/DL (ref 3.5–5.7)
ALBUMIN UR-MCNC: NEGATIVE MG/DL
ALBUMIN UR-MCNC: NEGATIVE MG/DL
ALP SERPL-CCNC: 55 U/L (ref 34–104)
ALP SERPL-CCNC: 59 U/L (ref 34–104)
ALP SERPL-CCNC: 67 U/L (ref 40–150)
ALP SERPL-CCNC: 70 U/L (ref 40–150)
ALT SERPL W P-5'-P-CCNC: 12 U/L (ref 0–70)
ALT SERPL W P-5'-P-CCNC: 12 U/L (ref 7–52)
ALT SERPL W P-5'-P-CCNC: 14 U/L (ref 0–70)
ALT SERPL W P-5'-P-CCNC: 9 U/L (ref 7–52)
AMPHETAMINES UR QL SCN: NOT DETECTED
AMPHETAMINES UR QL: NOT DETECTED NG/ML
ANION GAP SERPL CALCULATED.3IONS-SCNC: 7 MMOL/L (ref 3–14)
ANION GAP SERPL CALCULATED.3IONS-SCNC: 8 MMOL/L (ref 3–14)
ANION GAP SERPL CALCULATED.3IONS-SCNC: 9 MMOL/L (ref 3–14)
APPEARANCE UR: CLEAR
APPEARANCE UR: CLEAR
AST SERPL W P-5'-P-CCNC: 12 U/L (ref 13–39)
AST SERPL W P-5'-P-CCNC: 13 U/L (ref 13–39)
AST SERPL W P-5'-P-CCNC: 22 U/L (ref 0–45)
AST SERPL W P-5'-P-CCNC: 9 U/L (ref 0–45)
BACTERIA #/AREA URNS HPF: ABNORMAL /HPF
BACTERIA #/AREA URNS HPF: ABNORMAL /HPF
BARBITURATES UR QL SCN: NOT DETECTED NG/ML
BARBITURATES UR QL: NOT DETECTED
BASOPHILS # BLD AUTO: 0 10E9/L (ref 0–0.2)
BASOPHILS # BLD AUTO: 0.1 10E9/L (ref 0–0.2)
BASOPHILS NFR BLD AUTO: 0.4 %
BASOPHILS NFR BLD AUTO: 0.6 %
BASOPHILS NFR BLD AUTO: 0.9 %
BASOPHILS NFR BLD AUTO: 1.9 %
BENZODIAZ UR QL SCN: NOT DETECTED NG/ML
BENZODIAZ UR QL: NOT DETECTED
BILIRUB SERPL-MCNC: 0.2 MG/DL (ref 0.2–1.3)
BILIRUB SERPL-MCNC: 0.2 MG/DL (ref 0.3–1)
BILIRUB SERPL-MCNC: 0.3 MG/DL (ref 0.2–1.3)
BILIRUB SERPL-MCNC: 0.3 MG/DL (ref 0.3–1)
BILIRUB UR QL STRIP: ABNORMAL
BILIRUB UR QL STRIP: NEGATIVE
BUN SERPL-MCNC: 10 MG/DL (ref 7–30)
BUN SERPL-MCNC: 17 MG/DL (ref 7–25)
BUN SERPL-MCNC: 20 MG/DL (ref 7–25)
BUN SERPL-MCNC: 23 MG/DL (ref 7–30)
BUN SERPL-MCNC: 8 MG/DL (ref 7–30)
BUPRENORPHINE UR QL: NOT DETECTED NG/ML
BUPRENORPHINE UR QL: NOT DETECTED NG/ML
CALCIUM SERPL-MCNC: 8.3 MG/DL (ref 8.5–10.1)
CALCIUM SERPL-MCNC: 8.8 MG/DL (ref 8.6–10.3)
CALCIUM SERPL-MCNC: 9.3 MG/DL (ref 8.5–10.1)
CALCIUM SERPL-MCNC: 9.4 MG/DL (ref 8.6–10.3)
CALCIUM SERPL-MCNC: 9.9 MG/DL (ref 8.5–10.1)
CANNABINOIDS UR QL: NOT DETECTED NG/ML
CANNABINOIDS UR QL: NOT DETECTED NG/ML
CHLORIDE SERPL-SCNC: 100 MMOL/L (ref 94–109)
CHLORIDE SERPL-SCNC: 101 MMOL/L (ref 94–109)
CHLORIDE SERPL-SCNC: 101 MMOL/L (ref 98–107)
CHLORIDE SERPL-SCNC: 90 MMOL/L (ref 98–107)
CHLORIDE SERPL-SCNC: 95 MMOL/L (ref 94–109)
CHOLEST SERPL-MCNC: 180 MG/DL
CK SERPL-CCNC: 401 U/L (ref 30–300)
CK SERPL-CCNC: 474 U/L (ref 30–300)
CO2 SERPL-SCNC: 21 MMOL/L (ref 20–32)
CO2 SERPL-SCNC: 25 MMOL/L (ref 21–31)
CO2 SERPL-SCNC: 27 MMOL/L (ref 20–32)
CO2 SERPL-SCNC: 27 MMOL/L (ref 21–31)
CO2 SERPL-SCNC: 28 MMOL/L (ref 20–32)
COCAINE UR QL SCN: NOT DETECTED NG/ML
COCAINE UR QL: NOT DETECTED
COLOR UR AUTO: ABNORMAL
COLOR UR AUTO: YELLOW
CREAT SERPL-MCNC: 0.68 MG/DL (ref 0.66–1.25)
CREAT SERPL-MCNC: 0.86 MG/DL (ref 0.66–1.25)
CREAT SERPL-MCNC: 0.99 MG/DL (ref 0.7–1.3)
CREAT SERPL-MCNC: 1.27 MG/DL (ref 0.7–1.3)
CREAT SERPL-MCNC: 1.41 MG/DL (ref 0.66–1.25)
CRP SERPL-MCNC: 19 MG/L (ref 0–8)
CRP SERPL-MCNC: 39.5 MG/L (ref 0–8)
CRP SERPL-MCNC: 50 MG/L (ref 0–8)
D-METHAMPHET UR QL: NOT DETECTED NG/ML
D-METHAMPHET UR QL: NOT DETECTED NG/ML
DIFFERENTIAL METHOD BLD: ABNORMAL
EOSINOPHIL # BLD AUTO: 0 10E9/L (ref 0–0.7)
EOSINOPHIL # BLD AUTO: 0.1 10E9/L (ref 0–0.7)
EOSINOPHIL # BLD AUTO: 0.3 10E9/L (ref 0–0.7)
EOSINOPHIL # BLD AUTO: 0.4 10E9/L (ref 0–0.7)
EOSINOPHIL NFR BLD AUTO: 0.1 %
EOSINOPHIL NFR BLD AUTO: 1.4 %
EOSINOPHIL NFR BLD AUTO: 4.7 %
EOSINOPHIL NFR BLD AUTO: 7.1 %
ERYTHROCYTE [DISTWIDTH] IN BLOOD BY AUTOMATED COUNT: 14.1 % (ref 10–15)
ERYTHROCYTE [DISTWIDTH] IN BLOOD BY AUTOMATED COUNT: 15.1 % (ref 10–15)
ERYTHROCYTE [DISTWIDTH] IN BLOOD BY AUTOMATED COUNT: 15.5 % (ref 10–15)
ETHANOL SERPL-MCNC: <0.01 %
ETHANOL SERPL-MCNC: <0.01 G/DL
GFR SERPL CREATININE-BSD FRML MDRD: 55 ML/MIN/{1.73_M2}
GFR SERPL CREATININE-BSD FRML MDRD: 58 ML/MIN/{1.73_M2}
GFR SERPL CREATININE-BSD FRML MDRD: 78 ML/MIN/{1.73_M2}
GFR SERPL CREATININE-BSD FRML MDRD: >90 ML/MIN/{1.73_M2}
GFR SERPL CREATININE-BSD FRML MDRD: >90 ML/MIN/{1.73_M2}
GLUCOSE SERPL-MCNC: 100 MG/DL (ref 70–105)
GLUCOSE SERPL-MCNC: 105 MG/DL (ref 70–105)
GLUCOSE SERPL-MCNC: 122 MG/DL (ref 70–99)
GLUCOSE SERPL-MCNC: 89 MG/DL (ref 70–99)
GLUCOSE SERPL-MCNC: 98 MG/DL (ref 70–99)
GLUCOSE UR STRIP-MCNC: NEGATIVE MG/DL
GLUCOSE UR STRIP-MCNC: NEGATIVE MG/DL
HCT VFR BLD AUTO: 35.6 % (ref 40–53)
HCT VFR BLD AUTO: 38.6 % (ref 40–53)
HCT VFR BLD AUTO: 42.7 % (ref 40–53)
HCT VFR BLD AUTO: 44.4 % (ref 40–53)
HCT VFR BLD AUTO: 45 % (ref 40–53)
HDLC SERPL-MCNC: 54 MG/DL (ref 23–92)
HGB BLD-MCNC: 12.1 G/DL (ref 13.3–17.7)
HGB BLD-MCNC: 13.3 G/DL (ref 13.3–17.7)
HGB BLD-MCNC: 13.7 G/DL (ref 13.3–17.7)
HGB BLD-MCNC: 14.8 G/DL (ref 13.3–17.7)
HGB BLD-MCNC: 15.5 G/DL (ref 13.3–17.7)
HGB UR QL STRIP: ABNORMAL
HGB UR QL STRIP: ABNORMAL
IMM GRANULOCYTES # BLD: 0 10E9/L (ref 0–0.4)
IMM GRANULOCYTES # BLD: 0.1 10E9/L (ref 0–0.4)
IMM GRANULOCYTES NFR BLD: 0.3 %
IMM GRANULOCYTES NFR BLD: 0.4 %
IMM GRANULOCYTES NFR BLD: 0.4 %
IMM GRANULOCYTES NFR BLD: 0.6 %
KETONES UR STRIP-MCNC: NEGATIVE MG/DL
KETONES UR STRIP-MCNC: NEGATIVE MG/DL
LACTATE BLD-SCNC: 1.1 MMOL/L (ref 0.7–2)
LACTATE BLD-SCNC: 2.1 MMOL/L (ref 0.7–2)
LDLC SERPL CALC-MCNC: 89 MG/DL
LEUKOCYTE ESTERASE UR QL STRIP: NEGATIVE
LEUKOCYTE ESTERASE UR QL STRIP: NEGATIVE
LIPASE SERPL-CCNC: 84 U/L (ref 73–393)
LYMPHOCYTES # BLD AUTO: 0.9 10E9/L (ref 0.8–5.3)
LYMPHOCYTES # BLD AUTO: 1.3 10E9/L (ref 0.8–5.3)
LYMPHOCYTES # BLD AUTO: 1.3 10E9/L (ref 0.8–5.3)
LYMPHOCYTES # BLD AUTO: 1.4 10E9/L (ref 0.8–5.3)
LYMPHOCYTES NFR BLD AUTO: 10.1 %
LYMPHOCYTES NFR BLD AUTO: 14.4 %
LYMPHOCYTES NFR BLD AUTO: 24.8 %
LYMPHOCYTES NFR BLD AUTO: 24.8 %
MCH RBC QN AUTO: 27.6 PG (ref 26.5–33)
MCH RBC QN AUTO: 27.9 PG (ref 26.5–33)
MCH RBC QN AUTO: 27.9 PG (ref 26.5–33)
MCH RBC QN AUTO: 28.2 PG (ref 26.5–33)
MCH RBC QN AUTO: 30.3 PG (ref 26.5–33)
MCHC RBC AUTO-ENTMCNC: 32.1 G/DL (ref 31.5–36.5)
MCHC RBC AUTO-ENTMCNC: 33.3 G/DL (ref 31.5–36.5)
MCHC RBC AUTO-ENTMCNC: 34 G/DL (ref 31.5–36.5)
MCHC RBC AUTO-ENTMCNC: 34.4 G/DL (ref 31.5–36.5)
MCHC RBC AUTO-ENTMCNC: 34.5 G/DL (ref 31.5–36.5)
MCV RBC AUTO: 82 FL (ref 78–100)
MCV RBC AUTO: 82 FL (ref 78–100)
MCV RBC AUTO: 84 FL (ref 78–100)
MCV RBC AUTO: 86 FL (ref 78–100)
MCV RBC AUTO: 88 FL (ref 78–100)
METHADONE UR QL SCN: NOT DETECTED
METHADONE UR QL SCN: NOT DETECTED NG/ML
MONOCYTES # BLD AUTO: 0.6 10E9/L (ref 0–1.3)
MONOCYTES # BLD AUTO: 1.5 10E9/L (ref 0–1.3)
MONOCYTES NFR BLD AUTO: 10.7 %
MONOCYTES NFR BLD AUTO: 10.8 %
MONOCYTES NFR BLD AUTO: 11 %
MONOCYTES NFR BLD AUTO: 9.6 %
MUCOUS THREADS #/AREA URNS LPF: PRESENT /LPF
NEUTROPHILS # BLD AUTO: 10.9 10E9/L (ref 1.6–8.3)
NEUTROPHILS # BLD AUTO: 2.9 10E9/L (ref 1.6–8.3)
NEUTROPHILS # BLD AUTO: 3.1 10E9/L (ref 1.6–8.3)
NEUTROPHILS # BLD AUTO: 4.7 10E9/L (ref 1.6–8.3)
NEUTROPHILS NFR BLD AUTO: 55 %
NEUTROPHILS NFR BLD AUTO: 58 %
NEUTROPHILS NFR BLD AUTO: 73.7 %
NEUTROPHILS NFR BLD AUTO: 78.3 %
NITRATE UR QL: NEGATIVE
NITRATE UR QL: NEGATIVE
NON-SQ EPI CELLS #/AREA URNS LPF: ABNORMAL /LPF
NONHDLC SERPL-MCNC: 126 MG/DL
NRBC # BLD AUTO: 0 10*3/UL
NRBC # BLD AUTO: 0 10*3/UL
NRBC BLD AUTO-RTO: 0 /100
NRBC BLD AUTO-RTO: 0 /100
OPIATES UR QL SCN: NOT DETECTED
OPIATES UR QL SCN: NOT DETECTED NG/ML
OSMOLALITY SERPL: 273 MMOL/KG (ref 280–295)
OSMOLALITY UR: 245 MMOL/KG (ref 100–1200)
OXYCODONE UR QL SCN: NOT DETECTED NG/ML
OXYCODONE UR QL: NOT DETECTED NG/ML
PCP UR QL SCN: NOT DETECTED
PCP UR QL SCN: NOT DETECTED NG/ML
PH UR STRIP: 6 PH (ref 4.7–8)
PH UR STRIP: 7 PH (ref 5–9)
PHOSPHATE SERPL-MCNC: 3.1 MG/DL (ref 2.5–4.5)
PLATELET # BLD AUTO: 306 10E9/L (ref 150–450)
PLATELET # BLD AUTO: 347 10E9/L (ref 150–450)
PLATELET # BLD AUTO: 364 10E9/L (ref 150–450)
PLATELET # BLD AUTO: 387 10E9/L (ref 150–450)
PLATELET # BLD AUTO: 395 10E9/L (ref 150–450)
POTASSIUM SERPL-SCNC: 3.6 MMOL/L (ref 3.4–5.3)
POTASSIUM SERPL-SCNC: 3.7 MMOL/L (ref 3.4–5.3)
POTASSIUM SERPL-SCNC: 3.9 MMOL/L (ref 3.4–5.3)
POTASSIUM SERPL-SCNC: 4 MMOL/L (ref 3.5–5.1)
POTASSIUM SERPL-SCNC: 4.9 MMOL/L (ref 3.5–5.1)
PROPOXYPH UR QL: NOT DETECTED NG/ML
PROPOXYPH UR QL: NOT DETECTED NG/ML
PROT SERPL-MCNC: 6.9 G/DL (ref 6.4–8.9)
PROT SERPL-MCNC: 7.4 G/DL (ref 6.8–8.8)
PROT SERPL-MCNC: 8 G/DL (ref 6.8–8.8)
PROT SERPL-MCNC: 8.3 G/DL (ref 6.4–8.9)
PSA SERPL-ACNC: 1.37 NG/ML
RBC # BLD AUTO: 4.33 10E12/L (ref 4.4–5.9)
RBC # BLD AUTO: 4.71 10E12/L (ref 4.4–5.9)
RBC # BLD AUTO: 4.96 10E12/L (ref 4.4–5.9)
RBC # BLD AUTO: 5.12 10E12/L (ref 4.4–5.9)
RBC # BLD AUTO: 5.3 10E12/L (ref 4.4–5.9)
RBC #/AREA URNS AUTO: 8 /HPF (ref 0–2)
RBC #/AREA URNS AUTO: ABNORMAL /HPF
SODIUM SERPL-SCNC: 126 MMOL/L (ref 134–144)
SODIUM SERPL-SCNC: 129 MMOL/L (ref 133–144)
SODIUM SERPL-SCNC: 130 MMOL/L (ref 133–144)
SODIUM SERPL-SCNC: 135 MMOL/L (ref 134–144)
SODIUM SERPL-SCNC: 137 MMOL/L (ref 133–144)
SOURCE: ABNORMAL
SOURCE: ABNORMAL
SP GR UR STRIP: 1.01 (ref 1–1.03)
SP GR UR STRIP: 1.02 (ref 1–1.03)
TRICYCLICS UR QL SCN: NOT DETECTED NG/ML
TRICYCLICS UR QL SCN: NOT DETECTED NG/ML
TRIGL SERPL-MCNC: 183 MG/DL
TROPONIN I SERPL-MCNC: <0.015 UG/L (ref 0–0.04)
UROBILINOGEN UR STRIP-ACNC: 0.2 EU/DL (ref 0.2–1)
UROBILINOGEN UR STRIP-MCNC: NORMAL MG/DL (ref 0–2)
WBC # BLD AUTO: 13.9 10E9/L (ref 4–11)
WBC # BLD AUTO: 4.7 10E9/L (ref 4–11)
WBC # BLD AUTO: 5.2 10E9/L (ref 4–11)
WBC # BLD AUTO: 5.3 10E9/L (ref 4–11)
WBC # BLD AUTO: 6.4 10E9/L (ref 4–11)
WBC #/AREA URNS AUTO: <1 /HPF (ref 0–5)
WBC #/AREA URNS AUTO: ABNORMAL /HPF

## 2019-01-01 PROCEDURE — G0103 PSA SCREENING: HCPCS | Mod: ZL | Performed by: INTERNAL MEDICINE

## 2019-01-01 PROCEDURE — 85025 COMPLETE CBC W/AUTO DIFF WBC: CPT | Performed by: EMERGENCY MEDICINE

## 2019-01-01 PROCEDURE — 70450 CT HEAD/BRAIN W/O DYE: CPT | Mod: TC

## 2019-01-01 PROCEDURE — 99285 EMERGENCY DEPT VISIT HI MDM: CPT | Mod: 25

## 2019-01-01 PROCEDURE — 99213 OFFICE O/P EST LOW 20 MIN: CPT | Performed by: ORTHOPAEDIC SURGERY

## 2019-01-01 PROCEDURE — 36415 COLL VENOUS BLD VENIPUNCTURE: CPT | Performed by: INTERNAL MEDICINE

## 2019-01-01 PROCEDURE — 25000132 ZZH RX MED GY IP 250 OP 250 PS 637: Mod: GY | Performed by: FAMILY MEDICINE

## 2019-01-01 PROCEDURE — A9270 NON-COVERED ITEM OR SERVICE: HCPCS | Mod: GY | Performed by: INTERNAL MEDICINE

## 2019-01-01 PROCEDURE — 80306 DRUG TEST PRSMV INSTRMNT: CPT | Performed by: FAMILY MEDICINE

## 2019-01-01 PROCEDURE — 96375 TX/PRO/DX INJ NEW DRUG ADDON: CPT

## 2019-01-01 PROCEDURE — 72125 CT NECK SPINE W/O DYE: CPT | Mod: TC

## 2019-01-01 PROCEDURE — 93005 ELECTROCARDIOGRAM TRACING: CPT

## 2019-01-01 PROCEDURE — 80320 DRUG SCREEN QUANTALCOHOLS: CPT | Performed by: EMERGENCY MEDICINE

## 2019-01-01 PROCEDURE — 99239 HOSP IP/OBS DSCHRG MGMT >30: CPT | Performed by: INTERNAL MEDICINE

## 2019-01-01 PROCEDURE — 99283 EMERGENCY DEPT VISIT LOW MDM: CPT

## 2019-01-01 PROCEDURE — 72128 CT CHEST SPINE W/O DYE: CPT | Mod: TC

## 2019-01-01 PROCEDURE — 73030 X-RAY EXAM OF SHOULDER: CPT | Mod: TC,RT

## 2019-01-01 PROCEDURE — 99283 EMERGENCY DEPT VISIT LOW MDM: CPT | Mod: Z6 | Performed by: INTERNAL MEDICINE

## 2019-01-01 PROCEDURE — 80053 COMPREHEN METABOLIC PANEL: CPT | Performed by: FAMILY MEDICINE

## 2019-01-01 PROCEDURE — 85025 COMPLETE CBC W/AUTO DIFF WBC: CPT | Performed by: FAMILY MEDICINE

## 2019-01-01 PROCEDURE — 86140 C-REACTIVE PROTEIN: CPT | Performed by: INTERNAL MEDICINE

## 2019-01-01 PROCEDURE — 80053 COMPREHEN METABOLIC PANEL: CPT | Mod: ZL | Performed by: INTERNAL MEDICINE

## 2019-01-01 PROCEDURE — 25000125 ZZHC RX 250: Performed by: FAMILY MEDICINE

## 2019-01-01 PROCEDURE — 85025 COMPLETE CBC W/AUTO DIFF WBC: CPT | Performed by: INTERNAL MEDICINE

## 2019-01-01 PROCEDURE — G0463 HOSPITAL OUTPT CLINIC VISIT: HCPCS

## 2019-01-01 PROCEDURE — 85027 COMPLETE CBC AUTOMATED: CPT | Performed by: INTERNAL MEDICINE

## 2019-01-01 PROCEDURE — 25000132 ZZH RX MED GY IP 250 OP 250 PS 637: Mod: GY

## 2019-01-01 PROCEDURE — 25000125 ZZHC RX 250: Performed by: INTERNAL MEDICINE

## 2019-01-01 PROCEDURE — 99213 OFFICE O/P EST LOW 20 MIN: CPT | Performed by: FAMILY MEDICINE

## 2019-01-01 PROCEDURE — 96374 THER/PROPH/DIAG INJ IV PUSH: CPT | Mod: XU

## 2019-01-01 PROCEDURE — 83930 ASSAY OF BLOOD OSMOLALITY: CPT | Performed by: INTERNAL MEDICINE

## 2019-01-01 PROCEDURE — 25000128 H RX IP 250 OP 636: Performed by: INTERNAL MEDICINE

## 2019-01-01 PROCEDURE — 12000000 ZZH R&B MED SURG/OB

## 2019-01-01 PROCEDURE — 25500064 ZZH RX 255 OP 636: Performed by: INTERNAL MEDICINE

## 2019-01-01 PROCEDURE — 84484 ASSAY OF TROPONIN QUANT: CPT | Performed by: INTERNAL MEDICINE

## 2019-01-01 PROCEDURE — 81001 URINALYSIS AUTO W/SCOPE: CPT | Performed by: FAMILY MEDICINE

## 2019-01-01 PROCEDURE — 99222 1ST HOSP IP/OBS MODERATE 55: CPT | Mod: AI | Performed by: INTERNAL MEDICINE

## 2019-01-01 PROCEDURE — 36415 COLL VENOUS BLD VENIPUNCTURE: CPT | Mod: ZL | Performed by: INTERNAL MEDICINE

## 2019-01-01 PROCEDURE — 80069 RENAL FUNCTION PANEL: CPT | Performed by: INTERNAL MEDICINE

## 2019-01-01 PROCEDURE — 82550 ASSAY OF CK (CPK): CPT | Performed by: FAMILY MEDICINE

## 2019-01-01 PROCEDURE — 25000132 ZZH RX MED GY IP 250 OP 250 PS 637: Mod: GY | Performed by: INTERNAL MEDICINE

## 2019-01-01 PROCEDURE — 96361 HYDRATE IV INFUSION ADD-ON: CPT

## 2019-01-01 PROCEDURE — 25000132 ZZH RX MED GY IP 250 OP 250 PS 637: Mod: GY | Performed by: EMERGENCY MEDICINE

## 2019-01-01 PROCEDURE — 80320 DRUG SCREEN QUANTALCOHOLS: CPT | Performed by: FAMILY MEDICINE

## 2019-01-01 PROCEDURE — 83935 ASSAY OF URINE OSMOLALITY: CPT | Performed by: INTERNAL MEDICINE

## 2019-01-01 PROCEDURE — 80053 COMPREHEN METABOLIC PANEL: CPT | Performed by: INTERNAL MEDICINE

## 2019-01-01 PROCEDURE — 80307 DRUG TEST PRSMV CHEM ANLYZR: CPT | Performed by: EMERGENCY MEDICINE

## 2019-01-01 PROCEDURE — 36415 COLL VENOUS BLD VENIPUNCTURE: CPT | Performed by: EMERGENCY MEDICINE

## 2019-01-01 PROCEDURE — 83605 ASSAY OF LACTIC ACID: CPT | Performed by: FAMILY MEDICINE

## 2019-01-01 PROCEDURE — 99283 EMERGENCY DEPT VISIT LOW MDM: CPT | Mod: Z6 | Performed by: EMERGENCY MEDICINE

## 2019-01-01 PROCEDURE — G0463 HOSPITAL OUTPT CLINIC VISIT: HCPCS | Mod: 25

## 2019-01-01 PROCEDURE — 99285 EMERGENCY DEPT VISIT HI MDM: CPT | Mod: Z6 | Performed by: FAMILY MEDICINE

## 2019-01-01 PROCEDURE — 73221 MRI JOINT UPR EXTREM W/O DYE: CPT | Mod: RT

## 2019-01-01 PROCEDURE — 72100 X-RAY EXAM L-S SPINE 2/3 VWS: CPT

## 2019-01-01 PROCEDURE — 85025 COMPLETE CBC W/AUTO DIFF WBC: CPT | Mod: ZL | Performed by: INTERNAL MEDICINE

## 2019-01-01 PROCEDURE — 72131 CT LUMBAR SPINE W/O DYE: CPT | Mod: TC

## 2019-01-01 PROCEDURE — 93010 ELECTROCARDIOGRAM REPORT: CPT | Performed by: INTERNAL MEDICINE

## 2019-01-01 PROCEDURE — 99214 OFFICE O/P EST MOD 30 MIN: CPT | Performed by: FAMILY MEDICINE

## 2019-01-01 PROCEDURE — 83605 ASSAY OF LACTIC ACID: CPT | Performed by: INTERNAL MEDICINE

## 2019-01-01 PROCEDURE — 86140 C-REACTIVE PROTEIN: CPT | Performed by: FAMILY MEDICINE

## 2019-01-01 PROCEDURE — 99212 OFFICE O/P EST SF 10 MIN: CPT | Performed by: ORTHOPAEDIC SURGERY

## 2019-01-01 PROCEDURE — 83690 ASSAY OF LIPASE: CPT | Performed by: INTERNAL MEDICINE

## 2019-01-01 PROCEDURE — 99215 OFFICE O/P EST HI 40 MIN: CPT | Performed by: INTERNAL MEDICINE

## 2019-01-01 PROCEDURE — 74177 CT ABD & PELVIS W/CONTRAST: CPT | Mod: TC

## 2019-01-01 PROCEDURE — 97166 OT EVAL MOD COMPLEX 45 MIN: CPT | Mod: GO

## 2019-01-01 PROCEDURE — 40000133 ZZH STATISTIC OT WARD VISIT

## 2019-01-01 PROCEDURE — 99284 EMERGENCY DEPT VISIT MOD MDM: CPT | Mod: 25 | Performed by: EMERGENCY MEDICINE

## 2019-01-01 PROCEDURE — 99285 EMERGENCY DEPT VISIT HI MDM: CPT | Mod: Z6 | Performed by: INTERNAL MEDICINE

## 2019-01-01 PROCEDURE — 80061 LIPID PANEL: CPT | Mod: ZL | Performed by: INTERNAL MEDICINE

## 2019-01-01 PROCEDURE — 80053 COMPREHEN METABOLIC PANEL: CPT | Performed by: EMERGENCY MEDICINE

## 2019-01-01 PROCEDURE — 82550 ASSAY OF CK (CPK): CPT | Performed by: INTERNAL MEDICINE

## 2019-01-01 PROCEDURE — 71046 X-RAY EXAM CHEST 2 VIEWS: CPT | Mod: TC

## 2019-01-01 PROCEDURE — 81001 URINALYSIS AUTO W/SCOPE: CPT | Performed by: EMERGENCY MEDICINE

## 2019-01-01 PROCEDURE — 99212 OFFICE O/P EST SF 10 MIN: CPT | Mod: Z6 | Performed by: PHYSICIAN ASSISTANT

## 2019-01-01 RX ORDER — HYDROCODONE BITARTRATE AND ACETAMINOPHEN 5; 325 MG/1; MG/1
1 TABLET ORAL EVERY 6 HOURS PRN
Qty: 20 TABLET | Refills: 0 | Status: SHIPPED | OUTPATIENT
Start: 2019-01-01 | End: 2019-01-01

## 2019-01-01 RX ORDER — HYDRALAZINE HYDROCHLORIDE 20 MG/ML
5 INJECTION INTRAMUSCULAR; INTRAVENOUS EVERY 4 HOURS PRN
Status: DISCONTINUED | OUTPATIENT
Start: 2019-01-01 | End: 2019-01-01 | Stop reason: HOSPADM

## 2019-01-01 RX ORDER — MORPHINE SULFATE 4 MG/ML
4 INJECTION, SOLUTION INTRAMUSCULAR; INTRAVENOUS ONCE
Status: COMPLETED | OUTPATIENT
Start: 2019-01-01 | End: 2019-01-01

## 2019-01-01 RX ORDER — AMOXICILLIN 500 MG/1
500 CAPSULE ORAL ONCE
Status: COMPLETED | OUTPATIENT
Start: 2019-01-01 | End: 2019-01-01

## 2019-01-01 RX ORDER — METOPROLOL TARTRATE 25 MG/1
TABLET, FILM COATED ORAL
Qty: 180 TABLET | Refills: 1 | Status: SHIPPED | OUTPATIENT
Start: 2019-01-01 | End: 2019-01-01

## 2019-01-01 RX ORDER — ACETAMINOPHEN 325 MG/1
650 TABLET ORAL EVERY 4 HOURS PRN
Status: DISCONTINUED | OUTPATIENT
Start: 2019-01-01 | End: 2019-01-01 | Stop reason: HOSPADM

## 2019-01-01 RX ORDER — FUROSEMIDE 20 MG
20 TABLET ORAL DAILY
Status: DISCONTINUED | OUTPATIENT
Start: 2019-01-01 | End: 2019-01-01 | Stop reason: HOSPADM

## 2019-01-01 RX ORDER — CLONIDINE HYDROCHLORIDE 0.1 MG/1
0.2 TABLET ORAL ONCE
Status: COMPLETED | OUTPATIENT
Start: 2019-01-01 | End: 2019-01-01

## 2019-01-01 RX ORDER — AMOXICILLIN 500 MG/1
500 CAPSULE ORAL 3 TIMES DAILY
Qty: 15 CAPSULE | Refills: 0 | Status: SHIPPED | OUTPATIENT
Start: 2019-01-01 | End: 2019-01-01

## 2019-01-01 RX ORDER — METOCLOPRAMIDE HYDROCHLORIDE 5 MG/ML
10 INJECTION INTRAMUSCULAR; INTRAVENOUS ONCE
Status: COMPLETED | OUTPATIENT
Start: 2019-01-01 | End: 2019-01-01

## 2019-01-01 RX ORDER — CLONIDINE HYDROCHLORIDE 0.1 MG/1
0.1 TABLET ORAL ONCE
Status: COMPLETED | OUTPATIENT
Start: 2019-01-01 | End: 2019-01-01

## 2019-01-01 RX ORDER — NICOTINE 21 MG/24HR
1 PATCH, TRANSDERMAL 24 HOURS TRANSDERMAL DAILY
Status: DISCONTINUED | OUTPATIENT
Start: 2019-01-01 | End: 2019-01-01 | Stop reason: HOSPADM

## 2019-01-01 RX ORDER — PREDNISONE 20 MG/1
40 TABLET ORAL ONCE
Status: COMPLETED | OUTPATIENT
Start: 2019-01-01 | End: 2019-01-01

## 2019-01-01 RX ORDER — FUROSEMIDE 20 MG
20-40 TABLET ORAL DAILY
Qty: 60 TABLET | Refills: 11 | Status: SHIPPED | OUTPATIENT
Start: 2019-01-01

## 2019-01-01 RX ORDER — METOPROLOL TARTRATE 25 MG/1
50 TABLET, FILM COATED ORAL 2 TIMES DAILY
Qty: 360 TABLET | Refills: 3 | Status: SHIPPED | OUTPATIENT
Start: 2019-01-01

## 2019-01-01 RX ORDER — TRAMADOL HYDROCHLORIDE 50 MG/1
50 TABLET ORAL EVERY 6 HOURS PRN
Status: DISCONTINUED | OUTPATIENT
Start: 2019-01-01 | End: 2019-01-01 | Stop reason: HOSPADM

## 2019-01-01 RX ORDER — NICOTINE 21 MG/24HR
PATCH, TRANSDERMAL 24 HOURS TRANSDERMAL
Status: COMPLETED
Start: 2019-01-01 | End: 2019-01-01

## 2019-01-01 RX ORDER — ASPIRIN 325 MG
325 TABLET ORAL DAILY
Status: DISCONTINUED | OUTPATIENT
Start: 2019-01-01 | End: 2019-01-01 | Stop reason: HOSPADM

## 2019-01-01 RX ORDER — AMLODIPINE BESYLATE 10 MG/1
10 TABLET ORAL DAILY
Status: DISCONTINUED | OUTPATIENT
Start: 2019-01-01 | End: 2019-01-01 | Stop reason: HOSPADM

## 2019-01-01 RX ORDER — IOPAMIDOL 612 MG/ML
100 INJECTION, SOLUTION INTRAVASCULAR ONCE
Status: COMPLETED | OUTPATIENT
Start: 2019-01-01 | End: 2019-01-01

## 2019-01-01 RX ORDER — LOSARTAN POTASSIUM 50 MG/1
50 TABLET ORAL DAILY
Qty: 14 TABLET | Refills: 0 | Status: SHIPPED | OUTPATIENT
Start: 2019-01-01 | End: 2019-01-01

## 2019-01-01 RX ORDER — METOPROLOL TARTRATE 25 MG/1
25 TABLET, FILM COATED ORAL ONCE
Status: COMPLETED | OUTPATIENT
Start: 2019-01-01 | End: 2019-01-01

## 2019-01-01 RX ORDER — ONDANSETRON 2 MG/ML
4 INJECTION INTRAMUSCULAR; INTRAVENOUS ONCE
Status: DISCONTINUED | OUTPATIENT
Start: 2019-01-01 | End: 2019-01-01

## 2019-01-01 RX ORDER — ONDANSETRON 4 MG/1
4 TABLET, ORALLY DISINTEGRATING ORAL EVERY 6 HOURS PRN
Status: DISCONTINUED | OUTPATIENT
Start: 2019-01-01 | End: 2019-01-01 | Stop reason: HOSPADM

## 2019-01-01 RX ORDER — PREDNISONE 20 MG/1
20 TABLET ORAL DAILY
Qty: 3 TABLET | Refills: 0 | Status: SHIPPED | OUTPATIENT
Start: 2019-01-01 | End: 2019-01-01

## 2019-01-01 RX ORDER — LIDOCAINE 40 MG/G
CREAM TOPICAL
Status: DISCONTINUED | OUTPATIENT
Start: 2019-01-01 | End: 2019-01-01 | Stop reason: HOSPADM

## 2019-01-01 RX ORDER — TRAMADOL HYDROCHLORIDE 50 MG/1
50 TABLET ORAL EVERY 6 HOURS PRN
Qty: 10 TABLET | Refills: 0 | Status: ON HOLD | OUTPATIENT
Start: 2019-01-01 | End: 2020-01-01

## 2019-01-01 RX ORDER — METOPROLOL TARTRATE 25 MG/1
TABLET, FILM COATED ORAL
Qty: 180 TABLET | Refills: 0 | Status: SHIPPED | OUTPATIENT
Start: 2019-01-01 | End: 2019-01-01

## 2019-01-01 RX ORDER — AMLODIPINE BESYLATE 10 MG/1
10 TABLET ORAL DAILY
Qty: 90 TABLET | Refills: 3 | Status: SHIPPED | OUTPATIENT
Start: 2019-01-01

## 2019-01-01 RX ORDER — OMEPRAZOLE 40 MG/1
40 CAPSULE, DELAYED RELEASE ORAL DAILY
Qty: 30 CAPSULE | Refills: 3 | Status: SHIPPED | OUTPATIENT
Start: 2019-01-01 | End: 2019-01-01

## 2019-01-01 RX ORDER — NALOXONE HYDROCHLORIDE 0.4 MG/ML
.1-.4 INJECTION, SOLUTION INTRAMUSCULAR; INTRAVENOUS; SUBCUTANEOUS
Status: DISCONTINUED | OUTPATIENT
Start: 2019-01-01 | End: 2019-01-01 | Stop reason: HOSPADM

## 2019-01-01 RX ORDER — HYDROCODONE BITARTRATE AND ACETAMINOPHEN 5; 325 MG/1; MG/1
1 TABLET ORAL EVERY 6 HOURS PRN
Qty: 20 TABLET | Refills: 0 | OUTPATIENT
Start: 2019-01-01

## 2019-01-01 RX ORDER — ONDANSETRON 2 MG/ML
4 INJECTION INTRAMUSCULAR; INTRAVENOUS EVERY 6 HOURS PRN
Status: DISCONTINUED | OUTPATIENT
Start: 2019-01-01 | End: 2019-01-01 | Stop reason: HOSPADM

## 2019-01-01 RX ORDER — HYDROCODONE BITARTRATE AND ACETAMINOPHEN 7.5; 325 MG/1; MG/1
1 TABLET ORAL EVERY 4 HOURS PRN
Qty: 20 TABLET | Refills: 0 | Status: SHIPPED | OUTPATIENT
Start: 2019-01-01

## 2019-01-01 RX ORDER — ALUMINA, MAGNESIA, AND SIMETHICONE 2400; 2400; 240 MG/30ML; MG/30ML; MG/30ML
30 SUSPENSION ORAL ONCE
Status: COMPLETED | OUTPATIENT
Start: 2019-01-01 | End: 2019-01-01

## 2019-01-01 RX ORDER — METOPROLOL TARTRATE 50 MG
50 TABLET ORAL 2 TIMES DAILY
Status: DISCONTINUED | OUTPATIENT
Start: 2019-01-01 | End: 2019-01-01 | Stop reason: HOSPADM

## 2019-01-01 RX ORDER — AMLODIPINE BESYLATE 5 MG/1
5 TABLET ORAL ONCE
Status: COMPLETED | OUTPATIENT
Start: 2019-01-01 | End: 2019-01-01

## 2019-01-01 RX ORDER — NICOTINE 21 MG/24HR
1 PATCH, TRANSDERMAL 24 HOURS TRANSDERMAL DAILY
Status: DISCONTINUED | OUTPATIENT
Start: 2019-01-01 | End: 2019-01-01

## 2019-01-01 RX ORDER — GABAPENTIN 100 MG/1
200 CAPSULE ORAL 3 TIMES DAILY
Status: DISCONTINUED | OUTPATIENT
Start: 2019-01-01 | End: 2019-01-01 | Stop reason: HOSPADM

## 2019-01-01 RX ADMIN — ACETAMINOPHEN 650 MG: 325 TABLET, FILM COATED ORAL at 13:22

## 2019-01-01 RX ADMIN — GABAPENTIN 200 MG: 100 CAPSULE ORAL at 13:21

## 2019-01-01 RX ADMIN — HYDRALAZINE HYDROCHLORIDE 5 MG: 20 INJECTION INTRAMUSCULAR; INTRAVENOUS at 13:20

## 2019-01-01 RX ADMIN — OMEPRAZOLE 40 MG: 20 CAPSULE, DELAYED RELEASE ORAL at 08:51

## 2019-01-01 RX ADMIN — AMLODIPINE BESYLATE 5 MG: 5 TABLET ORAL at 02:46

## 2019-01-01 RX ADMIN — PREDNISONE 40 MG: 20 TABLET ORAL at 03:25

## 2019-01-01 RX ADMIN — METOPROLOL TARTRATE 50 MG: 50 TABLET, FILM COATED ORAL at 08:38

## 2019-01-01 RX ADMIN — METOPROLOL TARTRATE 25 MG: 25 TABLET ORAL at 02:46

## 2019-01-01 RX ADMIN — SODIUM CHLORIDE 1000 ML: 9 INJECTION, SOLUTION INTRAVENOUS at 02:43

## 2019-01-01 RX ADMIN — LIDOCAINE HYDROCHLORIDE 30 ML: 20 SOLUTION ORAL; TOPICAL at 23:25

## 2019-01-01 RX ADMIN — ASPIRIN 325 MG ORAL TABLET 325 MG: 325 PILL ORAL at 08:38

## 2019-01-01 RX ADMIN — HYDRALAZINE HYDROCHLORIDE 5 MG: 20 INJECTION INTRAMUSCULAR; INTRAVENOUS at 03:03

## 2019-01-01 RX ADMIN — MORPHINE SULFATE 4 MG: 4 INJECTION INTRAVENOUS at 05:16

## 2019-01-01 RX ADMIN — GABAPENTIN 200 MG: 100 CAPSULE ORAL at 08:38

## 2019-01-01 RX ADMIN — CLONIDINE HYDROCHLORIDE 0.1 MG: 0.1 TABLET ORAL at 00:40

## 2019-01-01 RX ADMIN — AMOXICILLIN 500 MG: 500 CAPSULE ORAL at 03:25

## 2019-01-01 RX ADMIN — METOCLOPRAMIDE 10 MG: 5 INJECTION, SOLUTION INTRAMUSCULAR; INTRAVENOUS at 05:12

## 2019-01-01 RX ADMIN — TRAMADOL HYDROCHLORIDE 50 MG: 50 TABLET, FILM COATED ORAL at 02:46

## 2019-01-01 RX ADMIN — FUROSEMIDE 20 MG: 20 TABLET ORAL at 08:38

## 2019-01-01 RX ADMIN — SODIUM CHLORIDE 1000 ML: 9 INJECTION, SOLUTION INTRAVENOUS at 05:16

## 2019-01-01 RX ADMIN — NICOTINE 1 PATCH: 21 PATCH, EXTENDED RELEASE TRANSDERMAL at 01:46

## 2019-01-01 RX ADMIN — ALUMINUM HYDROXIDE, MAGNESIUM HYDROXIDE, AND DIMETHICONE 30 ML: 400; 400; 40 SUSPENSION ORAL at 04:28

## 2019-01-01 RX ADMIN — AMLODIPINE BESYLATE 10 MG: 10 TABLET ORAL at 08:38

## 2019-01-01 RX ADMIN — FAMOTIDINE 20 MG: 10 INJECTION, SOLUTION INTRAVENOUS at 04:38

## 2019-01-01 RX ADMIN — Medication 1 PATCH: at 01:46

## 2019-01-01 RX ADMIN — IOPAMIDOL 100 ML: 612 INJECTION, SOLUTION INTRAVENOUS at 05:35

## 2019-01-01 RX ADMIN — CLONIDINE HYDROCHLORIDE 0.2 MG: 0.1 TABLET ORAL at 04:58

## 2019-01-01 RX ADMIN — GABAPENTIN 200 MG: 100 CAPSULE ORAL at 02:46

## 2019-01-01 RX ADMIN — TRAMADOL HYDROCHLORIDE 50 MG: 50 TABLET, FILM COATED ORAL at 08:51

## 2019-01-01 ASSESSMENT — ENCOUNTER SYMPTOMS
FEVER: 0
WOUND: 0
NAUSEA: 0
FEVER: 0
TROUBLE SWALLOWING: 0
CONFUSION: 0
SINUS PRESSURE: 0
NECK PAIN: 1
SHORTNESS OF BREATH: 0
BLOOD IN STOOL: 0
HEADACHES: 0
ABDOMINAL PAIN: 0
FREQUENCY: 0
SLEEP DISTURBANCE: 0
NUMBNESS: 0
NUMBNESS: 0
VOICE CHANGE: 0
BRUISES/BLEEDS EASILY: 0
SHORTNESS OF BREATH: 0
BACK PAIN: 1
SHORTNESS OF BREATH: 0
DIARRHEA: 0
AGITATION: 0
COUGH: 0
CHEST TIGHTNESS: 0
HEADACHES: 0
CHILLS: 0
WEAKNESS: 0
WHEEZING: 0
WHEEZING: 0
FLANK PAIN: 0
COLOR CHANGE: 0
BLOOD IN STOOL: 0
CONFUSION: 0
PALPITATIONS: 0
ABDOMINAL DISTENTION: 0
CHILLS: 0
MYALGIAS: 0
PALPITATIONS: 0
SHORTNESS OF BREATH: 0
ABDOMINAL PAIN: 0
COUGH: 0
WEAKNESS: 1
VOMITING: 0
BACK PAIN: 0
CHILLS: 0
DIFFICULTY URINATING: 0
CONFUSION: 0
ABDOMINAL PAIN: 1
FEVER: 0
EYE REDNESS: 0
FEVER: 0
NECK PAIN: 1
COUGH: 0
ABDOMINAL PAIN: 0
BACK PAIN: 1
VOICE CHANGE: 0
DIZZINESS: 0
VOMITING: 0
DYSURIA: 0
ARTHRALGIAS: 0
ANAL BLEEDING: 0
BACK PAIN: 0
FLANK PAIN: 0
DIAPHORESIS: 0
DIFFICULTY URINATING: 0
DYSURIA: 0
FACIAL SWELLING: 1
NAUSEA: 0
AGITATION: 0
WHEEZING: 0
CHEST TIGHTNESS: 0
JOINT SWELLING: 0
ARTHRALGIAS: 1
WEAKNESS: 0
ARTHRALGIAS: 0
VOMITING: 0
CHILLS: 0
CHEST TIGHTNESS: 0
DIZZINESS: 0
MYALGIAS: 0
COLOR CHANGE: 0
VOMITING: 0
MYALGIAS: 0
SLEEP DISTURBANCE: 0
NECK PAIN: 0
NECK STIFFNESS: 0
LIGHT-HEADEDNESS: 0
ABDOMINAL DISTENTION: 0
ANAL BLEEDING: 0
HEMATURIA: 0
LIGHT-HEADEDNESS: 0
LIGHT-HEADEDNESS: 0
DYSURIA: 0
DIZZINESS: 0
WOUND: 0
NAUSEA: 0
EYE REDNESS: 0
BACK PAIN: 1
NECK PAIN: 0
SHORTNESS OF BREATH: 0
FEVER: 0
NAUSEA: 1
NERVOUS/ANXIOUS: 1
CONFUSION: 0
COLOR CHANGE: 0
HEADACHES: 0
DIAPHORESIS: 0
DYSURIA: 0
PALPITATIONS: 0
NECK STIFFNESS: 1
FREQUENCY: 0

## 2019-01-01 ASSESSMENT — ANXIETY QUESTIONNAIRES
7. FEELING AFRAID AS IF SOMETHING AWFUL MIGHT HAPPEN: NOT AT ALL
GAD7 TOTAL SCORE: 8
6. BECOMING EASILY ANNOYED OR IRRITABLE: NOT AT ALL
2. NOT BEING ABLE TO STOP OR CONTROL WORRYING: SEVERAL DAYS
GAD7 TOTAL SCORE: 8
1. FEELING NERVOUS, ANXIOUS, OR ON EDGE: NEARLY EVERY DAY
3. WORRYING TOO MUCH ABOUT DIFFERENT THINGS: NEARLY EVERY DAY
IF YOU CHECKED OFF ANY PROBLEMS ON THIS QUESTIONNAIRE, HOW DIFFICULT HAVE THESE PROBLEMS MADE IT FOR YOU TO DO YOUR WORK, TAKE CARE OF THINGS AT HOME, OR GET ALONG WITH OTHER PEOPLE: NOT DIFFICULT AT ALL
5. BEING SO RESTLESS THAT IT IS HARD TO SIT STILL: SEVERAL DAYS

## 2019-01-01 ASSESSMENT — ACTIVITIES OF DAILY LIVING (ADL)
BATHING: 3-->ASSISTIVE EQUIPMENT AND PERSON
DRESS: 2-->ASSISTIVE PERSON
COGNITION: 0 - NO COGNITION ISSUES REPORTED
ADLS_ACUITY_SCORE: 25
SWALLOWING: 0-->SWALLOWS FOODS/LIQUIDS WITHOUT DIFFICULTY
TOILETING: 1-->ASSISTIVE EQUIPMENT
RETIRED_EATING: 1-->ASSISTIVE EQUIPMENT
TRANSFERRING: 3-->ASSISTIVE EQUIPMENT AND PERSON
RETIRED_COMMUNICATION: 0-->UNDERSTANDS/COMMUNICATES WITHOUT DIFFICULTY
ADLS_ACUITY_SCORE: 25
FALL_HISTORY_WITHIN_LAST_SIX_MONTHS: YES
NUMBER_OF_TIMES_PATIENT_HAS_FALLEN_WITHIN_LAST_SIX_MONTHS: 20
AMBULATION: 3-->ASSISTIVE EQUIPMENT AND PERSON
WHICH_OF_THE_ABOVE_FUNCTIONAL_RISKS_HAD_A_RECENT_ONSET_OR_CHANGE?: AMBULATION;TRANSFERRING;TOILETING;BATHING;DRESSING
ADLS_ACUITY_SCORE: 25

## 2019-01-01 ASSESSMENT — PATIENT HEALTH QUESTIONNAIRE - PHQ9
SUM OF ALL RESPONSES TO PHQ QUESTIONS 1-9: 9
5. POOR APPETITE OR OVEREATING: NOT AT ALL

## 2019-01-01 ASSESSMENT — MIFFLIN-ST. JEOR
SCORE: 1620.04
SCORE: 1601.9
SCORE: 1530.38
SCORE: 1620.04
SCORE: 1601.9
SCORE: 1596.91

## 2019-01-01 ASSESSMENT — PAIN SCALES - GENERAL
PAINLEVEL: NO PAIN (0)
PAINLEVEL: SEVERE PAIN (6)
PAINLEVEL: EXTREME PAIN (8)
PAINLEVEL: SEVERE PAIN (6)
PAINLEVEL: SEVERE PAIN (6)

## 2019-01-24 NOTE — DISCHARGE INSTRUCTIONS
What to expect when you have contrast    During your exam, we will inject  contrast  into your vein or artery. (Contrast is a clear liquid with iodine in it. It shows up on X-rays.)    You may feel warm or hot. You may have a metal taste in your mouth and a slight upset stomach. You may also feel pressure near the kidneys and bladder. These effects will last about 1 to 3 minutes.    Please tell us if you have:    Sneezing     Itching    Hives     Swelling in the face    A hoarse voice    Breathing problems    Other new symptoms    Serious problems are rare.  They may include:    Irregular heartbeat     Seizures    Kidney failure              Tissue damage    Shock      Death    If you have any problems during the exam, we  will treat them right away.    When you get home    Call your hospital if you have any new symptoms in the next 2 days, like hives or swelling. (Phone numbers are at the bottom of this page.) Or call your family doctor.     If you have wheezing or trouble breathing, call 911.    Self-care  -Drink at least 4 extra glasses of water today.   This reduces the stress on your kidneys.  -Keep taking your regular medicines.    The contrast will pass out of your body in your  Urine(pee). This will happen in the next 24 hours. You  will not feel this. Your urine will not  change color.    If you have kidney problems or take metformin    Drink 4 to 8 large glasses of water for the next  2 days, if you are not on a fluid restriction.        ?Special instructions: N/A    I have read and understand the above information.    Patient Sign Here:______________________________________Date:________Time:______    Staff Sign Here:________________________________________Date:_______Time:______      Radiology Departments:     ?[facility]: [phone] ?[facility]: [phone]     ?[facility]: [phone] ?[facility]:[phone]      ?[facility]: [phone]  ?[facility]: [phone]  ?[place]:[phone]    ?[facility]  Bank:[phone]  ?[facility]:[phone]

## 2019-01-24 NOTE — ED AVS SNAPSHOT
[facility]  [address] [address]  Phone:  [phone]                                    Ramo Berry   MRN: [id number]    Department:  [facility]   Date of Visit:  1/24/2019           After Visit Summary Signature Page    I have received my discharge instructions, and my questions have been answered. I have discussed any challenges I see with this plan with the nurse or doctor.    ..........................................................................................................................................  Patient/Patient Representative Signature      ..........................................................................................................................................  Patient Representative Print Name and Relationship to Patient    ..................................................               ................................................  Date                                   Time    ..........................................................................................................................................  Reviewed by Signature/Title    ...................................................              ..............................................  Date                                               Time          22EPIC Rev 08/18

## 2019-01-24 NOTE — ED TRIAGE NOTES
"Pt in for complaints of left sided abdominal pain. Reports pain has been intermittent for weeks. States he is here this am because he \"can't take it anymore\".  "

## 2019-01-24 NOTE — ED NOTES
Patient ambulated to ED room 5 with some difficulty. He reports right-sided chest pain X 1 year after pneumonia and some kind of lung surgery. The last 2-3 days patient reports worsening pain, severe acid reflux, and diaphoresis. Patient resting on ED cart, though appears uncomfortable.

## 2019-01-24 NOTE — TELEPHONE ENCOUNTER
Patient was in the ER. Today, ok to offer overbook for tomorrow if that's when he needs a follow up.  CATRACHITO WHARTON

## 2019-01-24 NOTE — TELEPHONE ENCOUNTER
9:07 AM    Reason for Call: OVERBOOK    Patient is having the following symptoms: ER Follow up     The patient is requesting an appointment for ASAP  with Dr. Nayan Santillan    Was an appointment offered for this call?   No    Preferred method for responding to this message: [phone]    If we cannot reach you directly, may we leave a detailed response at the number you provided? Yes      Barbra Vazquez

## 2019-01-24 NOTE — ED NOTES
Patient discharged to home with spouse. Patient and family verbalize/demonstrate understanding of all written and verbal instructions. All questions answered.

## 2019-01-24 NOTE — ED NOTES
DATE:  1/24/2019   TIME OF RECEIPT FROM LAB:  0501  LAB TEST:  Lactic acid  LAB VALUE:  0501  RESULTS GIVEN WITH READ-BACK TO (PROVIDER):  Dr. Ashraf  TIME LAB VALUE REPORTED TO PROVIDER:   0501

## 2019-01-25 NOTE — PROGRESS NOTES
SUBJECTIVE:   Ramo Berry is a 59 year old male who presents to clinic today for the following health issues:      ED/UC Followup:    Facility:  [facility]   Date of visit: 1/24/2019  Reason for visit: Gastroparesis, gastric distention, abdominal pain, right upper quadrant  Current Status: this morning had terrible right side pain- now he feels fine : states that it comes and goes   This started about 2 weeks ago.  He does drink a lot of coffee and is a smoker no problems with alcohol.  Had significant pain epigastric and right upper quadrant.  He has had no weight loss.  He has had problems in the past with chest pain following a chest tube placement.  Currently now he is eating fine no vomiting and stooling okay.       [facility]    Ramo Berry, 59 year old, male presents with   Chief Complaint   Patient presents with     ER F/U       PAST MEDICAL HISTORY:  Past Medical History:   Diagnosis Date     Anxiety state, unspecified 09/20/2012     Back Pain 09/20/2012     Cervical disc disease 09/20/2012     Chronic pain syndrome 09/20/2012     Community acquired bacterial pneumonia 3/27/2018     Essential hypertension, benign 09/20/2012     Loculated pleural effusion 03/17/2018    right       PAST SURGICAL HISTORY:  Past Surgical History:   Procedure Laterality Date     ARTHROSCOPY SHOULDER, OPEN BICEP TENODESIS REPAIR, COMBINED Right 4/21/2017    Procedure: COMBINED ARTHROSCOPY SHOULDER, OPEN BICEP TENODESIS REPAIR;;  Surgeon: Talha Farrell DO;  Location: [facility] OR     ARTHROSCOPY SHOULDER, OPEN ROTATOR CUFF REPAIR, COMBINED Right 4/21/2017    Procedure: COMBINED ARTHROSCOPY SHOULDER, OPEN ROTATOR CUFF REPAIR;  RIGHT SHOULDER ARTHROSCOPY WITH Open  REPAIR OF ROTATOR CUFFand BICEP TENDODESIS;  Surgeon: Talha Farrell DO;  Location: [facility] OR     C6 C7 cervical fusion       cataract extraction and lens implantation      Bilateral      cystoscopy with biopsies      hematuria     fusion L5 S1    "    L4 L5 spinal fusion       LUNG SURGERY Right 03/17/2018    Surgery at [facility] to remove infection. Started with pneumonia got worse.     MOUTH SURGERY       ORTHOPEDIC SURGERY Right 08/09/2016    rotator cuff surgery      THORACOTOMY  03/17/2018    right video assisted thoracoscopic surgery       MEDICATIONS:  Prior to Admission medications    Medication Sig Start Date End Date Taking? Authorizing Provider   amLODIPine (NORVASC) 10 MG tablet TAKE 1 TABLET BY MOUTH ONCE DAILY 12/14/18  Yes JUSTUS Santillan MD   aspirin 325 MG tablet Take 1 tablet by mouth every 4 hours as needed. For pain   Yes Reported, Patient   lisinopril (PRINIVIL/ZESTRIL) 40 MG tablet TAKE 1 TABLET BY MOUTH ONCE DAILY 11/21/18  Yes JUSTUS Santillan MD   metoprolol tartrate (LOPRESSOR) 25 MG tablet TAKE 2 TABLETS BY MOUTH TWICE DAILY 12/13/18  Yes JUSTUS Santillan MD   omeprazole (PRILOSEC) 20 MG CR capsule TAKE TWO CAPSULES BY MOUTH ONCE DAILY 6/5/18  Yes JUSTUS Santillan MD   omeprazole (PRILOSEC) 40 MG DR capsule Take 1 capsule (40 mg) by mouth daily 1/25/19 4/25/19 Yes JUSTUS Santillan MD   hydrochlorothiazide 12.5 MG TABS tablet TAKE ONE TABLET BY MOUTH ONCE DAILY  Patient not taking: Reported on 1/25/2019 12/8/17   JUSTUS Santillan MD       ALLERGIES:   No Known Allergies    ROS:  Constitutional, neuro, ENT, endocrine, pulmonary, cardiac, gastrointestinal, genitourinary, musculoskeletal, integument and psychiatric systems are negative, except as otherwise noted.      EXAM:  /70 (BP Location: Left arm, Patient Position: Chair, Cuff Size: Adult Large)   Pulse 69   Temp 97.2  F (36.2  C) (Tympanic)   Ht 1.778 m (5' 10\")   Wt 79.4 kg (175 lb)   SpO2 97%   BMI 25.11 kg/m   Body mass index is 25.11 kg/m .   GENERAL APPEARANCE: healthy, alert and no distress  EYES: Eyes grossly normal to inspection, PERRL and conjunctivae and sclerae normal  HENT:nose and mouth without ulcers or lesions  NECK: no adenopathy, no asymmetry, masses, or " scars and thyroid normal to palpation  RESP: lungs clear to auscultation - no rales, rhonchi or wheezes  CV: regular rates and rhythm, normal S1 S2, no S3 or S4 and no murmur, click or rub  ABDOMEN: soft, nontender, without hepatosplenomegaly or masses and bowel sounds normal  NEURO: Normal strength and tone, mentation intact and speech normal  Lab/ X-ray  Results for orders placed or performed during the hospital encounter of 01/24/19 (from the past 48 hour(s))   Lactate for Sepsis Protocol   Result Value Ref Range    Lactate for Sepsis Protocol 2.1 (H) 0.7 - 2.0 mmol/L   CBC with platelets differential   Result Value Ref Range    WBC 13.9 (H) 4.0 - 11.0 10e9/L    RBC Count 5.12 4.4 - 5.9 10e12/L    Hemoglobin 15.5 13.3 - 17.7 g/dL    Hematocrit 45.0 40.0 - 53.0 %    MCV 88 78 - 100 fl    MCH 30.3 26.5 - 33.0 pg    MCHC 34.4 31.5 - 36.5 g/dL    RDW 14.1 10.0 - 15.0 %    Platelet Count 387 150 - 450 10e9/L    Diff Method Automated Method     % Neutrophils 78.3 %    % Lymphocytes 10.1 %    % Monocytes 10.7 %    % Eosinophils 0.1 %    % Basophils 0.4 %    % Immature Granulocytes 0.4 %    Nucleated RBCs 0 0 /100    Absolute Neutrophil 10.9 (H) 1.6 - 8.3 10e9/L    Absolute Lymphocytes 1.4 0.8 - 5.3 10e9/L    Absolute Monocytes 1.5 (H) 0.0 - 1.3 10e9/L    Absolute Eosinophils 0.0 0.0 - 0.7 10e9/L    Absolute Basophils 0.1 0.0 - 0.2 10e9/L    Abs Immature Granulocytes 0.1 0 - 0.4 10e9/L    Absolute Nucleated RBC 0.0    Comprehensive metabolic panel   Result Value Ref Range    Sodium 137 133 - 144 mmol/L    Potassium 3.9 3.4 - 5.3 mmol/L    Chloride 100 94 - 109 mmol/L    Carbon Dioxide 28 20 - 32 mmol/L    Anion Gap 9 3 - 14 mmol/L    Glucose 122 (H) 70 - 99 mg/dL    Urea Nitrogen 23 7 - 30 mg/dL    Creatinine 1.41 (H) 0.66 - 1.25 mg/dL    GFR Estimate 55 (L) >60 mL/min/[1.73_m2]    GFR Estimate If Black 63 >60 mL/min/[1.73_m2]    Calcium 9.9 8.5 - 10.1 mg/dL    Bilirubin Total 0.3 0.2 - 1.3 mg/dL    Albumin 4.3 3.4 - 5.0  g/dL    Protein Total 8.0 6.8 - 8.8 g/dL    Alkaline Phosphatase 67 40 - 150 U/L    ALT 14 0 - 70 U/L    AST 9 0 - 45 U/L   Lipase   Result Value Ref Range    Lipase 84 73 - 393 U/L   CRP inflammation   Result Value Ref Range    CRP Inflammation 19.0 (H) 0.0 - 8.0 mg/L   Troponin I   Result Value Ref Range    Troponin I ES <0.015 0.000 - 0.045 ug/L   XR Chest 2 Views    Narrative    PROCEDURE:  XR CHEST 2 VW    HISTORY:  chest pain.     COMPARISON:  August 19, 2018    FINDINGS:   The cardiac silhouette is normal in size. The pulmonary vasculature is  normal.  The lungs are clear. There is blunting of the right  costophrenic angle which is chronic. Healing right rib fractures are  seen.      Impression    IMPRESSION:  No acute cardiopulmonary disease.      JOSE MANTILLA MD   Abd/pelvis CT - IV contrast only TRAUMA / AAA    Narrative    EXAMINATION: CT ABDOMEN PELVIS W CONTRAST, 1/24/2019 5:54 AM    TECHNIQUE:  Helical CT images from the lung bases through the  symphysis pubis were obtained  with IV contrast. Contrast dose: Isovue  300 100 Ml    COMPARISON: none    HISTORY: Abd pain, unspecified    FINDINGS:    There is dependent atelectasis at the lung bases.    The liver is free of masses or biliary ductal enlargement. No  calcified gallstones are seen.    The the spleen and pancreas appear normal.    The adrenal glands are normal.    There are some benign cysts seen in both kidneys. There is no  hydronephrosis.    The periaortic lymph nodes are normal in caliber. There is a 3.7 cm  infrarenal abdominal aortic aneurysm.    There is marked gastric gastric distention with fluid. Small bowel  appears normal. Sigmoid diverticulosis is noted.    In the pelvis the bladder and rectum appear normal.    Postoperative changes are seen in the lower lumbar spine      Impression    IMPRESSION: Marked gastric distention with fluid     JOSE MANTILLA MD       ASSESSMENT/PLAN:    ICD-10-CM    1. Gastroesophageal reflux disease  with esophagitis K21.0 omeprazole (PRILOSEC) 40 MG DR capsule     GENERAL SURG ADULT REFERRAL   2. Abdominal pain, epigastric R10.13 GENERAL SURG ADULT REFERRAL   Labs and x-ray were reviewed.  His abdomen is soft now.  We discussed that I am concerned if he does not have GERD symptoms and with how sick he felt in the ER 2 days ago that we should set him up to see 1 of our surgeons for possible EGD.  Send in a prescription for omeprazole.  Told him to elevate the head of the bed and avoid late night meals and cut down on his smoking and his caffeine intake.  His CT is significant for an infrarenal abdominal aortic aneurysm which I do not think is affecting this pain but had gastric distention.  His CRP and WBC were slightly elevated.  Currently now does not appear toxic and is comfortable with this plan.  I told him if the pain returns and he has severe pain he should go to the emergency room.      BALJEET Santillan MD  January 25, 2019
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
## 2019-02-12 NOTE — PROGRESS NOTES
Co-signed orders received per Dr. Nayan Santillan to pull PICC line. Sent to scanning   reading glasses/Normal vision: sees adequately in most situations; can see medication labels, newsprint

## 2019-02-18 NOTE — TELEPHONE ENCOUNTER
Metoprolol  Last Written Prescription Date: 12/13/18  Last Fill Quantity: 180 # of Refills: 0  Last Office Visit: 1/25/19
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
## 2019-02-23 NOTE — ED AVS SNAPSHOT
[facility]  [address] [address]  Phone:  [phone]                                    Ramo Berry   MRN: [id number]    Department:  [facility]   Date of Visit:  2/23/2019           After Visit Summary Signature Page    I have received my discharge instructions, and my questions have been answered. I have discussed any challenges I see with this plan with the nurse or doctor.    ..........................................................................................................................................  Patient/Patient Representative Signature      ..........................................................................................................................................  Patient Representative Print Name and Relationship to Patient    ..................................................               ................................................  Date                                   Time    ..........................................................................................................................................  Reviewed by Signature/Title    ...................................................              ..............................................  Date                                               Time          22EPIC Rev 08/18

## 2019-02-23 NOTE — ED NOTES
Pt ambulatory to room 2 of the ED accompanied by wife. Pt reports that he noticed yesterday around 1400 that his right cheek started to feel like it was swelling. Pt presents tonight with a golf ball sized swelling of the right medial cheek. No redness noted, pt denies pain. Call light in reach.

## 2019-03-06 NOTE — TELEPHONE ENCOUNTER
ivaniaco      Last Written Prescription Date: 2/24/18  Last Fill Quantity: 20,   # refills: 0  Last Office Visit: 1/25/19  Future Office visit:       Routing refill request to provider for review/approval because:  Drug not on the [facility], P or [facility] refill protocol or controlled substance

## 2019-03-08 NOTE — TELEPHONE ENCOUNTER
Patient called.  This writer gave information from below.  Patient stated he doesn't think he should have to come in, that an old man might need a few pain pills once in a while, he doesn't care and hung up.

## 2019-03-08 NOTE — TELEPHONE ENCOUNTER
Please call patient and notify him he needs an appt to discuss pain medication refill  CATRACHITO WHARTON

## 2019-04-05 NOTE — PROGRESS NOTES
SUBJECTIVE:   Ramo Berry is a 59 year old male who presents to clinic today for the following health issues:      Hypertension Follow-up      Outpatient blood pressures are being checked at home.  Results are /70-80's.    Low Salt Diet: not monitoring salt  He was on ACE inhibitor but developed angioedema.  Was seen in the emergency room taken off.  Started Cozaar and then that was pulled because of product contamination that was an issue nationally.  He is on amlodipine and beta-blocker now.  Denies any chest pain or shortness of breath  Chronic Pain Follow-Up       Type / Location of Pain: From waist up/spinal fusion  Analgesia/pain control:       Recent changes: Is only on aspirin      Overall control: Inadequate pain control  Activity level/function:      Daily activities:  Unable to perform most daily activities - chores, hobbies, social activities, driving    Work:  Unable to work  Adverse effects:  No  Adherance    Taking medication as directed?  Yes    Participating in other treatments: not applicable  Risk Factors:    Sleep:  Fair    Mood/anxiety:  worsened    Recent family or social stressors:  none noted    Other aggravating factors: prolonged sitting, prolonged standing, poor posture and repetitive activities - lifting or bending- not able much.  He states his issue with narcotic poisoning was he actually was just very constipated.  He states he is leaving to go back to the [facility] to be with his Dr. Vogel who he will be getting an appointment to see soon  PHQ-9 SCORE 11/13/2017 4/6/2018 5/14/2018   PHQ-9 Total Score 4 2 4     LENORA-7 SCORE 11/13/2017 4/6/2018 5/14/2018   Total Score 4 3 6     Encounter-Level CSA:    There are no encounter-level csa.     Patient-Level CSA:    There are no patient-level csa.         Amount of exercise or physical activity:3-4 days/week for an average of less than 15 minutes    Problems taking medications regularly: No    Medication side effects:  facial swelling with lisinopril    Diet: regular (no restrictions)    [facility]    Ramo Berry, 59 year old, male presents with   Chief Complaint   Patient presents with     Hypertension     Pain       PAST MEDICAL HISTORY:  Past Medical History:   Diagnosis Date     Anxiety state, unspecified 09/20/2012     Back Pain 09/20/2012     Cervical disc disease 09/20/2012     Chronic pain syndrome 09/20/2012     Community acquired bacterial pneumonia 3/27/2018     Essential hypertension, benign 09/20/2012     Loculated pleural effusion 03/17/2018    right       PAST SURGICAL HISTORY:  Past Surgical History:   Procedure Laterality Date     ARTHROSCOPY SHOULDER, OPEN BICEP TENODESIS REPAIR, COMBINED Right 4/21/2017    Procedure: COMBINED ARTHROSCOPY SHOULDER, OPEN BICEP TENODESIS REPAIR;;  Surgeon: Talha Farrell DO;  Location: [facility]     ARTHROSCOPY SHOULDER, OPEN ROTATOR CUFF REPAIR, COMBINED Right 4/21/2017    Procedure: COMBINED ARTHROSCOPY SHOULDER, OPEN ROTATOR CUFF REPAIR;  RIGHT SHOULDER ARTHROSCOPY WITH Open  REPAIR OF ROTATOR CUFFand BICEP TENDODESIS;  Surgeon: Talha Farrell DO;  Location: [facility] OR     C6 C7 cervical fusion       cataract extraction and lens implantation      Bilateral      cystoscopy with biopsies      hematuria     fusion L5 S1       L4 L5 spinal fusion       LUNG SURGERY Right 03/17/2018    Surgery at [facility] to remove infection. Started with pneumonia got worse.     MOUTH SURGERY       ORTHOPEDIC SURGERY Right 08/09/2016    rotator cuff surgery      THORACOTOMY  03/17/2018    right video assisted thoracoscopic surgery       MEDICATIONS:  Prior to Admission medications    Medication Sig Start Date End Date Taking? Authorizing Provider   amLODIPine (NORVASC) 10 MG tablet TAKE 1 TABLET BY MOUTH ONCE DAILY 12/14/18  Yes JUSTUS Santillan MD   aspirin 325 MG tablet Take 325 mg by mouth daily For pain    Yes Reported, Patient   HYDROcodone-acetaminophen (NORCO) 5-325 MG tablet  Take 1 tablet by mouth every 6 hours as needed for pain 4/5/19 4/8/19 Yes JUSTUS Santillan MD   metoprolol tartrate (LOPRESSOR) 25 MG tablet TAKE 2 TABLETS BY MOUTH TWICE DAILY 2/18/19  Yes JUSTUS Santillan MD   omeprazole (PRILOSEC) 40 MG DR capsule Take 1 capsule (40 mg) by mouth daily 1/25/19 4/25/19 Yes JUSTUS Santillan MD       ALLERGIES:     Allergies   Allergen Reactions     Lisinopril Swelling       ROS:  Constitutional, HEENT, cardiovascular, pulmonary, gi and gu systems are negative, except as otherwise noted.      EXAM:  /70 (BP Location: Left arm, Patient Position: Chair, Cuff Size: Adult Regular)   Pulse 78   Temp 96.4  F (35.8  C) (Tympanic)   Resp 20   SpO2 98%  There is no height or weight on file to calculate BMI.   GENERAL APPEARANCE: healthy, alert and no distress  RESP: lungs clear to auscultation - no rales, rhonchi or wheezes  CV: regular rates and rhythm, normal S1 S2, no S3 or S4 and no murmur, click or rub  MS: Has tenderness midline spine.  Also he describes radiculopathy bilaterally.  No perianal numbness  Lab/ X-ray  No results found for this or any previous visit (from the past 24 hour(s)).    ASSESSMENT/PLAN:    ICD-10-CM    1. Benign essential hypertension I10    2. Chronic midline low back pain with sciatica, sciatica laterality unspecified M54.40 HYDROcodone-acetaminophen (NORCO) 5-325 MG tablet    G89.29      He has chronic pain is had 2 spinal fusion surgeries.  He states he is leaving 8 and will be seen in Dr. Vogel in the [facility].  He is requesting just a few pain tablets to cover him until he gets there.  I told him when he gets there he will need to have a pain contract and do urine drug screens.  I gave him 20 tablets and I told him no more here.  Regarding his blood pressure is controlled now with the beta-blocker and the amlodipine.  He suffered angioedema from an ACE inhibitor and then was put on Cozaar but that was pulled because of national marketing  issues with possible contamination.    BALJEET Santillan MD  April 5, 2019

## 2019-04-05 NOTE — NURSING NOTE
"Chief Complaint   Patient presents with     Hypertension     Pain       Initial /70 (BP Location: Left arm, Patient Position: Chair, Cuff Size: Adult Regular)   Pulse 78   Temp 96.4  F (35.8  C) (Tympanic)   Resp 20   SpO2 98%  Estimated body mass index is 25.11 kg/m  as calculated from the following:    Height as of 1/25/19: 1.778 m (5' 10\").    Weight as of 1/25/19: 79.4 kg (175 lb).  Medication Reconciliation: complete    Laura Castellon LPN    "
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
## 2019-04-14 NOTE — ED TRIAGE NOTES
Patient arrives by self with complaint of lumbar back pain. History of multiple surgeries. Prescribed 5mg Hydrocodone by Dr. Santillan and ran out yesterday. Requesting it be refilled.

## 2019-04-15 NOTE — TELEPHONE ENCOUNTER
HYDROcodone-acetaminophen (NORCO) 5-325 MG tablet  Last Written Prescription Date:  4/5/19  Last Fill Quantity: 20,   # refills: 0  Last Office Visit: 4/5/19  Future Office visit:       Routing refill request to provider for review/approval because:  Drug not on the [facility], [facility] or [facility] refill protocol or controlled substance

## 2019-07-02 NOTE — PROGRESS NOTES
Patient presents today with chief complaint of pain and limited range of motion his right shoulder.  He was camping approximately a month ago, doing a lot of riding on 4 wheelers and cutting wood etc.  The pain began while he was doing these activities and has not relented.  He is tried rest, avoidance of aggravating activity, anti-inflammatory medication all without any improvement at all.  He did have a rotator cuff repair for a large tear and a biceps tenodesis approximately 2 years ago and had been doing fine until this episode a month ago.    Physical exam demonstrates slight atrophy over the right shoulder girdle.  He has a full range of motion but he definitely has a painful arc from approximately 70 degrees of forward flexion to approximately 100 degrees.  Strength testing demonstrates approximately grade 4 strength in external rotation and in supraspinatus isolation.  The shoulder is stable to stress testing, there is tenderness over the coracoid, tenderness over the lateral proximal humerus.  X-rays demonstrate previous acromioclavicular joint resection, no other abnormalities are noted.    Assessment and plan: He may have reinjured his rotator cuff by these signs and symptoms.  I am recommending an MRI, maintain his range of motion is best he can until that and follow-up once the MRI is completed.

## 2019-07-02 NOTE — NURSING NOTE
"No chief complaint on file.      Initial /82 (BP Location: Left arm, Patient Position: Sitting, Cuff Size: Adult Regular)   Pulse 73   Temp 97.7  F (36.5  C) (Tympanic)   Ht 1.778 m (5' 10\")   Wt 77.6 kg (171 lb)   SpO2 98%   BMI 24.54 kg/m   Estimated body mass index is 24.54 kg/m  as calculated from the following:    Height as of this encounter: 1.778 m (5' 10\").    Weight as of this encounter: 77.6 kg (171 lb).  Medication Reconciliation: complete  "

## 2019-07-05 NOTE — TELEPHONE ENCOUNTER
Patient called requesting something for shoulder pain from Dr. Farrell He may be reached at [phone]

## 2019-07-08 NOTE — TELEPHONE ENCOUNTER
Will be referring pt to Dr Pruitt.  Love, could you please set him up with an appt with Dr Pruitt?  Thank you!

## 2019-07-08 NOTE — TELEPHONE ENCOUNTER
Spoke with pt and he did not want to see a PCP for pain he justed wanted his follow up with Dr. Farrell to review his MRI of Rt Should. Appt scheduled

## 2019-07-08 NOTE — TELEPHONE ENCOUNTER
Discussed with Dr. Farrell. We do not do pain contracts either. Would need a pain management referral.

## 2019-07-08 NOTE — TELEPHONE ENCOUNTER
Unfortunately pt's pcp does not do pain contracts, so could we discuss with Dr Farrell on Tuesday?  Thank you!

## 2019-07-09 NOTE — PROGRESS NOTES
Patient presents today follow-up after his MRI for his right shoulder for rotator cuff injury, suspected tear.  Since his last visit his shoulder pain is diminished somewhat, he is using it now as a helper arm as his arm over his head with discomfort.  He still gets some sleep disturbance with it though.  The MRI demonstrates his previously repaired rotator cuff to be intact.  There is no bony impingement from his acromion or acromioclavicular joint, the biceps tendon remains tenodesed in the intertubercular groove.  There is some tendinosis and tendinitis.  Findings were discussed with him, recommend he gently progressively increase use of his arm and report any difficulty progressing or certainly worsening of his symptoms and will return at that time for repeat evaluation.  Suspect he will completely recover from this present episode however.

## 2019-07-09 NOTE — NURSING NOTE
"Chief Complaint   Patient presents with     Follow Up     right shoulder       Initial BP (!) 150/96 (BP Location: Left arm, Cuff Size: Adult Regular)   Pulse 60   Ht 1.778 m (5' 10\")   Wt 77.6 kg (171 lb)   SpO2 98%   BMI 24.54 kg/m   Estimated body mass index is 24.54 kg/m  as calculated from the following:    Height as of this encounter: 1.778 m (5' 10\").    Weight as of this encounter: 77.6 kg (171 lb).  Medication Reconciliation: complete  "
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
## 2019-09-12 NOTE — TELEPHONE ENCOUNTER
omeprazole  Last Written Prescription Date: 7/23/19  Last Fill Quantity: 120 # of Refills: 0  Last Office Visit: 4/5/19

## 2019-10-15 PROBLEM — Z01.818 PREOP GENERAL PHYSICAL EXAM: Status: RESOLVED | Noted: 2017-04-12 | Resolved: 2019-01-01

## 2019-10-16 NOTE — PROGRESS NOTES
"Nursing Notes:   Evelia Ann LPN  10/21/2019 11:10 AM  Signed  Patient presents to the clinic for ongoing concerns about balance-weak legs.  Patient reports falling multiple times over the past couple of months.  Swelling and weakness of both hands/feet over the past couple of months.      Chief Complaint   Patient presents with     Establish Care       Initial BP (!) 146/80 (BP Location: Right arm, Patient Position: Sitting, Cuff Size: Adult Regular)   Pulse 72   Temp 98.4  F (36.9  C) (Tympanic)   Resp 20   Wt 79.1 kg (174 lb 7 oz)   BMI 25.03 kg/m    Estimated body mass index is 25.03 kg/m  as calculated from the following:    Height as of 7/9/19: 1.778 m (5' 10\").    Weight as of this encounter: 79.1 kg (174 lb 7 oz).  Medication Reconciliation: complete    Evelia Ann LPN    Nursing note reviewed with patient.  Accuracy and completeness verified.   Mr. Berry is a 59 year old male who:  Patient presents with:  Establish [facility]      ICD-10-CM    1. Benign essential hypertension I10 CBC and Differential     Comprehensive Metabolic Panel     amLODIPine (NORVASC) 10 MG tablet     metoprolol tartrate (LOPRESSOR) 25 MG tablet     furosemide (LASIX) 20 MG tablet     Comprehensive Metabolic Panel     CBC and Differential   2. Mixed hyperlipidemia E78.2 Lipid Panel     Lipid Panel   3. Screening PSA (prostate specific antigen) Z12.5 PSA Screen GH     PSA Screen GH   4. Gastroesophageal reflux disease without esophagitis K21.9 omeprazole (PRILOSEC) 20 MG DR capsule   5. Chronic anxiety F41.9    6. Recurrent low back pain M54.5 PHYSICAL THERAPY REFERRAL     traMADol (ULTRAM) 50 MG tablet     MR Lumbar Spine w/o Contrast   7. Recurrent falls R29.6 MR Lumbar Spine w/o Contrast   8. Tobacco use Z72.0    9. Lumbar disc disorder M51.9 PHYSICAL THERAPY REFERRAL     traMADol (ULTRAM) 50 MG tablet     MR Lumbar Spine w/o Contrast   10. Marijuana use F12.90    11. Bilateral hand pain M79.641     M79.642    12. S/P " cervical spinal fusion Z98.1 MR Cervical Spine w/o Contrast   13. S/P lumbar spinal fusion Z98.1 MR Lumbar Spine w/o Contrast   14. Chronic neck pain M54.2 MR Cervical Spine w/o Contrast    G89.29      HPI  Patient presents for establish care appointment.  He most recently was following with a doctor in [place].    Hypertension, blood pressure is high.  He had angioedema due to lisinopril and was taken off lisinopril.  He was started on something else from the emergency room but then that never got refilled so now his blood pressures are high.  He is having some hand and foot swelling.  He would like to start some type of diuretic.  Discussed options, start Lasix.  1 to 2 tablets daily.  Prescription sent to pharmacy.    Mixed hyperlipidemia, currently diet controlled.  Check labs.    Screening PSA, denies changes in urinary health.  Labs ordered.    Heartburn and reflux, persistent.  Needs refills of omeprazole.    Chronic anxiety, was getting Klonopin 2 mg tablets 3 times daily.  Encouraged to follow-up/establish care with mental health specialist for ongoing antianxiety medication treatment options.    Recurrent low back pain.  Has been having progressive symptoms.  History of lumbar fusion.  Has been falling.  States his legs give out.  Denies any cardiac symptoms prior to falling down and legs giving out.  Worried about back related problems  .  + progressive low back pain over past 6 months, getting leg weakness, and started falling. + during this time, started using a cane for balance.   + legs will give out. Denies dizziness/lightheadedness with episodes.   Small prescription of tramadol provided for breakthrough pain.    --> Wants to get some MRIs.  Lumbar and cervical.  Orders placed.    Previous low back x-ray noted, see results below.    Still smoking about half pack of cigarettes per day.  Encourage cessation.  States that he is not smoking marijuana much anymore.  It seems to cause more anxiety helping  relieve any pain.  Encouraged avoidance of illegal drug use.    History of cervical spinal fusion and chronic neck pain.  Patient would like to get updated MRI.  Orders placed.    Functional Capacity: less than 4 METS.   Review of Systems   Constitutional: Negative for chills and fever.   HENT: Negative for congestion and hearing loss.    Eyes: Negative for visual disturbance.   Respiratory: Negative for cough, shortness of breath and wheezing.    Cardiovascular: Negative for chest pain and palpitations.   Gastrointestinal: Negative for abdominal pain, diarrhea, nausea and vomiting.   Endocrine: Negative for cold intolerance and heat intolerance.   Genitourinary: Negative for dysuria and hematuria.   Musculoskeletal: Positive for arthralgias, back pain, gait problem (  + legs will give out at times. ), neck pain and neck stiffness. Negative for myalgias.        + in wheelchair today.   + using cane for past 6 months or so.    Skin: Negative for rash and wound.   Allergic/Immunologic: Negative for immunocompromised state.   Neurological: Negative for dizziness and light-headedness.   Hematological: Does not bruise/bleed easily.   Psychiatric/Behavioral: Negative for agitation and confusion. The patient is nervous/anxious.         Problem List/PMH: reviewed in EMR, and made relevant updates today.  Medications: reviewed in EMR, and made relevant updates today.  Allergies: reviewed in EMR, and made relevant updates today.  Allergies   Allergen Reactions     Lisinopril Angioedema     + stomach upset        Current Outpatient Medications   Medication Sig Dispense Refill     amLODIPine (NORVASC) 10 MG tablet Take 1 tablet (10 mg) by mouth daily 90 tablet 3     aspirin 325 MG tablet Take 325 mg by mouth daily For pain        furosemide (LASIX) 20 MG tablet Take 1-2 tablets (20-40 mg) by mouth daily - for swelling / blood pressure 60 tablet 11     metoprolol tartrate (LOPRESSOR) 25 MG tablet Take 2 tablets (50 mg) by mouth  "2 times daily 360 tablet 3     omeprazole (PRILOSEC) 20 MG DR capsule Take 2 capsules (40 mg) by mouth daily 180 capsule 3     traMADol (ULTRAM) 50 MG tablet Take 1 tablet (50 mg) by mouth every 6 hours as needed for severe pain 10 tablet 0        I reviewed family and social history and made relevant updates today.  Social History     Tobacco Use     Smoking status: Current Every Day Smoker     Packs/day: 0.50     Years: 35.00     Pack years: 17.50     Types: Cigarettes     Smokeless tobacco: Never Used     Tobacco comment: Tried to Quit: Yes; Passive Exposure: Yes; Longest Tobacco Free: 2 years    Substance Use Topics     Alcohol use: No     Drug use: No      Family History   Problem Relation Age of Onset     Other - See Comments Father         back surgery      Cerebrovascular Disease Father         CVA (cause of death)     Other - See Comments Sister         back problems     Cancer Other         Maternal side     Diabetes Other      Other - See Comments Sister         lumbar fusion       EXAM:   Vitals:    10/21/19 1027   BP: (!) 146/80   BP Location: Right arm   Patient Position: Sitting   Cuff Size: Adult Regular   Pulse: 72   Resp: 20   Temp: 98.4  F (36.9  C)   TempSrc: Tympanic   Weight: 79.1 kg (174 lb 7 oz)       Current Pain Score: Severe Pain (6)     BP Readings from Last 3 Encounters:   10/21/19 (!) 146/80   07/09/19 (!) 150/96   07/02/19 142/80      Wt Readings from Last 3 Encounters:   10/21/19 79.1 kg (174 lb 7 oz)   07/09/19 77.6 kg (171 lb)   07/02/19 77.6 kg (171 lb)      Estimated body mass index is 25.03 kg/m  as calculated from the following:    Height as of 7/9/19: 1.778 m (5' 10\").    Weight as of this encounter: 79.1 kg (174 lb 7 oz).     Physical Exam  Constitutional:       General: He is not in acute distress.     Appearance: He is well-developed. He is not diaphoretic.   HENT:      Head: Normocephalic and atraumatic.   Eyes:      General: No scleral icterus.     Conjunctiva/sclera: " Conjunctivae normal.   Neck:      Musculoskeletal: Neck supple.   Cardiovascular:      Rate and Rhythm: Normal rate and regular rhythm.   Pulmonary:      Effort: Pulmonary effort is normal.      Breath sounds: Normal breath sounds.   Musculoskeletal:         General: Swelling (  + mild swelling of hands and lower extremities) and tenderness (  neck and low back to palpation) present. No deformity.   Lymphadenopathy:      Cervical: No cervical adenopathy.   Skin:     General: Skin is warm and dry.      Findings: No rash.   Neurological:      Mental Status: He is alert.      Cranial Nerves: No cranial nerve deficit.   Psychiatric:         Mood and Affect: Mood normal.         Behavior: Behavior normal.          Procedures   INVESTIGATIONS:  Results for orders placed or performed in visit on 10/21/19   Lipid Panel   Result Value Ref Range    Cholesterol 180 <200 mg/dL    Triglycerides 183 (H) <150 mg/dL    HDL Cholesterol 54 23 - 92 mg/dL    LDL Cholesterol Calculated 89 <100 mg/dL    Non HDL Cholesterol 126 <130 mg/dL   Comprehensive Metabolic Panel   Result Value Ref Range    Sodium 135 134 - 144 mmol/L    Potassium 4.9 3.5 - 5.1 mmol/L    Chloride 101 98 - 107 mmol/L    Carbon Dioxide 25 21 - 31 mmol/L    Anion Gap 9 3 - 14 mmol/L    Glucose 100 70 - 105 mg/dL    Urea Nitrogen 17 7 - 25 mg/dL    Creatinine 0.99 0.70 - 1.30 mg/dL    GFR Estimate 78 >60 mL/min/[1.73_m2]    GFR Estimate If Black >90 >60 mL/min/[1.73_m2]    Calcium 8.8 8.6 - 10.3 mg/dL    Bilirubin Total 0.2 (L) 0.3 - 1.0 mg/dL    Albumin 4.0 3.5 - 5.7 g/dL    Protein Total 6.9 6.4 - 8.9 g/dL    Alkaline Phosphatase 55 34 - 104 U/L    ALT 12 7 - 52 U/L    AST 12 (L) 13 - 39 U/L   CBC and Differential   Result Value Ref Range    WBC 5.2 4.0 - 11.0 10e9/L    RBC Count 4.96 4.4 - 5.9 10e12/L    Hemoglobin 13.7 13.3 - 17.7 g/dL    Hematocrit 42.7 40.0 - 53.0 %    MCV 86 78 - 100 fl    MCH 27.6 26.5 - 33.0 pg    MCHC 32.1 31.5 - 36.5 g/dL    RDW 15.5 (H)  10.0 - 15.0 %    Platelet Count 395 150 - 450 10e9/L    Diff Method Automated Method     % Neutrophils 55.0 %    % Lymphocytes 24.8 %    % Monocytes 10.8 %    % Eosinophils 7.1 %    % Basophils 1.9 %    % Immature Granulocytes 0.4 %    Absolute Neutrophil 2.9 1.6 - 8.3 10e9/L    Absolute Lymphocytes 1.3 0.8 - 5.3 10e9/L    Absolute Monocytes 0.6 0.0 - 1.3 10e9/L    Absolute Eosinophils 0.4 0.0 - 0.7 10e9/L    Absolute Basophils 0.1 0.0 - 0.2 10e9/L    Abs Immature Granulocytes 0.0 0 - 0.4 10e9/L   PSA Screen GH   Result Value Ref Range    PSA Screen 1.368 <3.100 ng/mL       ASSESSMENT AND PLAN:  Problem List Items Addressed This Visit        Nervous and Auditory    Recurrent low back pain    Relevant Medications    traMADol (ULTRAM) 50 MG tablet    Other Relevant Orders    PHYSICAL THERAPY REFERRAL    MR Lumbar Spine w/o Contrast    Bilateral hand pain       Circulatory    Benign essential hypertension - Primary    Relevant Medications    amLODIPine (NORVASC) 10 MG tablet    metoprolol tartrate (LOPRESSOR) 25 MG tablet    furosemide (LASIX) 20 MG tablet    Other Relevant Orders    CBC and Differential (Completed)    Comprehensive Metabolic Panel (Completed)       Musculoskeletal and Integumentary    Lumbar disc disorder    Relevant Medications    traMADol (ULTRAM) 50 MG tablet    Other Relevant Orders    PHYSICAL THERAPY REFERRAL    MR Lumbar Spine w/o Contrast       Behavioral    Marijuana use    Tobacco use       Other    S/P cervical spinal fusion    Relevant Orders    MR Cervical Spine w/o Contrast    S/P lumbar spinal fusion    Relevant Orders    MR Lumbar Spine w/o Contrast    Chronic anxiety    Recurrent falls    Relevant Orders    MR Lumbar Spine w/o Contrast      Other Visit Diagnoses     Mixed hyperlipidemia        Relevant Orders    Lipid Panel (Completed)    Screening PSA (prostate specific antigen)        Relevant Orders    PSA Screen GH (Completed)    Gastroesophageal reflux disease without esophagitis         Relevant Medications    omeprazole (PRILOSEC) 20 MG DR capsule    Chronic neck pain        Relevant Orders    MR Cervical Spine w/o Contrast        40 minutes spent in face-to-face interaction with patient and wife (separate from separately billed procedures) with greater than 50% spent in counseling and care coordination of listed medical problems.      reviewed diet, exercise and weight control, very strongly urged to quit smoking to reduce cardiovascular risk  -- Expected clinical course discussed    -- Medications and their side effects discussed    Patient Instructions   One time prescription for a few tablets of tramadol / ultram for severe pain / breakthrough pain.   -- this will not be long-term treatment.     Ibuprofen 200 mg tablet - take with food -- 3 or 4 times daily. Up to 3 tablets per dose - maximum 12 per day.    --- Or ---     Naproxen 220 to 440 mg tablet(s) - take 220 to 440 mg with food Twice daily as needed for pain.      ------- in addition to -------    Tylenol 500 mgtablet - 1 or 2 every 4 to 6 hours as needed for pain - maximum 8 per day.     --- Or ---     Tylenol Arthritis 650 mg tablet  -- up to every 4 times daily.    --- Maximum total Tylenol in 24 hours -- is 4,000 mg.        Neck and lumbar MRI ordered.     Physical therapy referral sent  - they will call with date/time of appointment.       Labs today.     No more marijuana use.   Keep working to quit smoking.     Start Lasix for blood pressure / swelling and edema. 20 to 40 mg daily.   -- you will know within about 1 hour if dose is okay.       -- Schedule follow-up with your neck / back surgeon.... after MRI completed.   -- or get the images pushed to your surgical team / doctor and have them review the results, may need to have another follow-up appointment with [facility].       7/5/2019 Xray: Procedure: XR LUMBAR SPINE 2-3 VIEWS  HISTORY: Embedded foreign body  TECHNIQUE: Lumbar spine 3 views.  COMPARISON:  9/24/2015  FINDINGS:  There are postoperative changes from anterior spinal fusion at L4-5  and L5-S1. Surgical hardware is in unchanged position. Stimulator  device is also present posteriorly with leads in stable position. Disc  spaces are narrowed with degenerative endplate changes above the  fusion. There is slight scoliosis.                                                                       IMPRESSION: Unchanged postoperative appearance of the lumbar spine.  Stimulator device in unchanged position.       SHANTE WIGGINS MD    -- Schedule appointment with one of the local psychiatry providers, for chronic anxiety medication management.     Return as needed for follow-up with Dr. Schwarz.    Clinic : [phone]  Appointment line: [phone]     Robert Schwarz MD  Internal Medicine  [facility]     Portions of this note were dictated using speech recognition software. The note has been proofread but errors in the text may have been overlooked. Please contact me if there are any concerns regarding the accuracy of the dictation.

## 2019-10-21 PROBLEM — M79.642 BILATERAL HAND PAIN: Status: ACTIVE | Noted: 2019-01-01

## 2019-10-21 PROBLEM — R29.6 RECURRENT FALLS: Status: ACTIVE | Noted: 2019-01-01

## 2019-10-21 PROBLEM — M79.641 BILATERAL HAND PAIN: Status: ACTIVE | Noted: 2019-01-01

## 2019-10-21 PROBLEM — M51.9 LUMBAR DISC DISORDER: Status: ACTIVE | Noted: 2019-01-01

## 2019-10-21 PROBLEM — F41.9 CHRONIC ANXIETY: Status: ACTIVE | Noted: 2019-01-01

## 2019-10-21 PROBLEM — M54.50 RECURRENT LOW BACK PAIN: Status: ACTIVE | Noted: 2019-01-01

## 2019-10-21 NOTE — NURSING NOTE
"Patient presents to the clinic for ongoing concerns about balance-weak legs.  Patient reports falling multiple times over the past couple of months.  Swelling and weakness of both hands/feet over the past couple of months.      Chief Complaint   Patient presents with     Establish Care       Initial BP (!) 146/80 (BP Location: Right arm, Patient Position: Sitting, Cuff Size: Adult Regular)   Pulse 72   Temp 98.4  F (36.9  C) (Tympanic)   Resp 20   Wt 79.1 kg (174 lb 7 oz)   BMI 25.03 kg/m   Estimated body mass index is 25.03 kg/m  as calculated from the following:    Height as of 7/9/19: 1.778 m (5' 10\").    Weight as of this encounter: 79.1 kg (174 lb 7 oz).  Medication Reconciliation: complete    Evelia Ann LPN    "

## 2019-10-21 NOTE — PATIENT INSTRUCTIONS
One time prescription for a few tablets of tramadol / ultram for severe pain / breakthrough pain.   -- this will not be long-term treatment.     Ibuprofen 200 mg tablet - take with food -- 3 or 4 times daily. Up to 3 tablets per dose - maximum 12 per day.    --- Or ---     Naproxen 220 to 440 mg tablet(s) - take 220 to 440 mg with food Twice daily as needed for pain.      ------- in addition to -------    Tylenol 500 mgtablet - 1 or 2 every 4 to 6 hours as needed for pain - maximum 8 per day.     --- Or ---     Tylenol Arthritis 650 mg tablet  -- up to every 4 times daily.    --- Maximum total Tylenol in 24 hours -- is 4,000 mg.        Neck and lumbar MRI ordered.     Physical therapy referral sent  - they will call with date/time of appointment.       Labs today.     No more marijuana use.   Keep working to quit smoking.     Start Lasix for blood pressure / swelling and edema. 20 to 40 mg daily.   -- you will know within about 1 hour if dose is okay.       -- Schedule follow-up with your neck / back surgeon.... after MRI completed.   -- or get the images pushed to your surgical team / doctor and have them review the results, may need to have another follow-up appointment with [facility].       7/5/2019 Xray: Procedure: XR LUMBAR SPINE 2-3 VIEWS  HISTORY: Embedded foreign body  TECHNIQUE: Lumbar spine 3 views.  COMPARISON: 9/24/2015  FINDINGS:  There are postoperative changes from anterior spinal fusion at L4-5  and L5-S1. Surgical hardware is in unchanged position. Stimulator  device is also present posteriorly with leads in stable position. Disc  spaces are narrowed with degenerative endplate changes above the  fusion. There is slight scoliosis.                                                                       IMPRESSION: Unchanged postoperative appearance of the lumbar spine.  Stimulator device in unchanged position.       SHANTE WIGGINS MD    -- Schedule appointment with one of the local psychiatry  providers, for chronic anxiety medication management.     Return as needed for follow-up with Dr. Schwarz.    Clinic : [phone]  Appointment line: [phone]

## 2019-10-21 NOTE — LETTER
October 22, 2019      Ramo Berry  [address] [facility] [address]        Dear ,    We are writing to inform you of your test results.    Labs look good.  Continue current medications.    Resulted Orders   Lipid Panel   Result Value Ref Range    Cholesterol 180 <200 mg/dL    Triglycerides 183 (H) <150 mg/dL      Comment:      Borderline high:  150-199 mg/dl  High:             200-499 mg/dl  Very high:       >499 mg/dl      HDL Cholesterol 54 23 - 92 mg/dL    LDL Cholesterol Calculated 89 <100 mg/dL      Comment:      Desirable:       <100 mg/dl    Non HDL Cholesterol 126 <130 mg/dL   Comprehensive Metabolic Panel   Result Value Ref Range    Sodium 135 134 - 144 mmol/L    Potassium 4.9 3.5 - 5.1 mmol/L    Chloride 101 98 - 107 mmol/L    Carbon Dioxide 25 21 - 31 mmol/L    Anion Gap 9 3 - 14 mmol/L    Glucose 100 70 - 105 mg/dL    Urea Nitrogen 17 7 - 25 mg/dL    Creatinine 0.99 0.70 - 1.30 mg/dL    GFR Estimate 78 >60 mL/min/[1.73_m2]    GFR Estimate If Black >90 >60 mL/min/[1.73_m2]    Calcium 8.8 8.6 - 10.3 mg/dL    Bilirubin Total 0.2 (L) 0.3 - 1.0 mg/dL    Albumin 4.0 3.5 - 5.7 g/dL    Protein Total 6.9 6.4 - 8.9 g/dL    Alkaline Phosphatase 55 34 - 104 U/L    ALT 12 7 - 52 U/L    AST 12 (L) 13 - 39 U/L   CBC and Differential   Result Value Ref Range    WBC 5.2 4.0 - 11.0 10e9/L    RBC Count 4.96 4.4 - 5.9 10e12/L    Hemoglobin 13.7 13.3 - 17.7 g/dL    Hematocrit 42.7 40.0 - 53.0 %    MCV 86 78 - 100 fl    MCH 27.6 26.5 - 33.0 pg    MCHC 32.1 31.5 - 36.5 g/dL    RDW 15.5 (H) 10.0 - 15.0 %    Platelet Count 395 150 - 450 10e9/L    Diff Method Automated Method     % Neutrophils 55.0 %    % Lymphocytes 24.8 %    % Monocytes 10.8 %    % Eosinophils 7.1 %    % Basophils 1.9 %    % Immature Granulocytes 0.4 %    Absolute Neutrophil 2.9 1.6 - 8.3 10e9/L    Absolute Lymphocytes 1.3 0.8 - 5.3 10e9/L    Absolute Monocytes 0.6 0.0 - 1.3 10e9/L    Absolute Eosinophils 0.4 0.0 - 0.7 10e9/L    Absolute Basophils  0.1 0.0 - 0.2 10e9/L    Abs Immature Granulocytes 0.0 0 - 0.4 10e9/L   PSA Screen GH   Result Value Ref Range    PSA Screen 1.368 <3.100 ng/mL      Comment:      The DXI Access PSAS WHO assay is a two site immunoenzymatic assay. Assay   values obtained with different assay methods cannot be used interchangeably   due to differences in assay methods and reagent specificity.         If you have any questions or concerns, please call the clinic at the number listed above.       Sincerely,        Robert Schwarz MD

## 2019-10-22 NOTE — TELEPHONE ENCOUNTER
After proper verification, patient stated that provider said to take the omeprazole differently but could not remember what he was supposed to do. This writer could not find any notes regarding a change in medication at this time. Will route off to provider.  Leigh Hawthorne LPN on 10/22/2019 at 11:03 AM

## 2019-10-22 NOTE — TELEPHONE ENCOUNTER
Try to limit PPI use (prilosec or similar) as much as possible.     Consider using Pepcid for heartburn -- twice daily as needed.      Robert Schwarz MD

## 2019-10-25 NOTE — PROGRESS NOTES
Had patient up at bedside to see his abilities with mobility and transfers. Patient had significant difficulty getting to the edge of the cart and with standing at bedside. Very weak.  Leaning to the side and forward. Difficulty grasping the walker. Unable to complete orthostatics due to patient needing to sit back down.
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
## 2019-10-25 NOTE — ED TRIAGE NOTES
Patient was up to BR, got weak, fell from standing height.  Hit head. Denies head pain.  Has significant hisotyr of back pain/ surgeries.

## 2019-10-25 NOTE — ED AVS SNAPSHOT
[facility]  [address]  [place] [address]  Phone:  [phone]  Fax:  [phone]                                    Ramo Berry   MRN: [id number]    Department:  [facility]   Date of Visit:  10/25/2019           After Visit Summary Signature Page    I have received my discharge instructions, and my questions have been answered. I have discussed any challenges I see with this plan with the nurse or doctor.    ..........................................................................................................................................  Patient/Patient Representative Signature      ..........................................................................................................................................  Patient Representative Print Name and Relationship to Patient    ..................................................               ................................................  Date                                   Time    ..........................................................................................................................................  Reviewed by Signature/Title    ...................................................              ..............................................  Date                                               Time          22EPIC Rev 08/18

## 2019-10-29 NOTE — TELEPHONE ENCOUNTER
Patient was in the ER on 10-25-19, patient chose to go home versus staying in the hospital.  Spouse is the only caregiver and needs assistance.  Patient is very unstable and keeps falling (patient fell trying to get out of the car when returning home from the ER) and can barely stand.  They are looking to get home care PT/OT, health aide and anything else that can help with patient.        Evelia Ann LPN 10/29/2019 4:52 PM

## 2019-10-29 NOTE — TELEPHONE ENCOUNTER
Pls call regarding getting possible home care services & other supplies needed to help care for Pt.

## 2019-10-30 NOTE — TELEPHONE ENCOUNTER
He is weak and falling.  His sodium was less than 130 (new issue).  He should have been admitted to the hospital.    His wife may need to consider bringing him back to the emergency room.    If needed call an ambulance.  He can go to the [facility] if desired.    Robert Schwarz MD

## 2019-10-31 PROBLEM — R29.2 HYPERREFLEXIA: Status: ACTIVE | Noted: 2019-01-01

## 2019-10-31 PROBLEM — R27.8 DYSMETRIA: Status: ACTIVE | Noted: 2019-01-01

## 2019-10-31 PROBLEM — T79.6XXA TRAUMATIC RHABDOMYOLYSIS, INITIAL ENCOUNTER (H): Status: ACTIVE | Noted: 2019-01-01

## 2019-10-31 PROBLEM — G81.91 RIGHT HEMIPARESIS (H): Status: ACTIVE | Noted: 2019-01-01

## 2019-10-31 PROBLEM — F17.200 TOBACCO DEPENDENCE SYNDROME: Status: ACTIVE | Noted: 2019-01-01

## 2019-10-31 PROBLEM — M62.82 RHABDOMYOLYSIS: Status: ACTIVE | Noted: 2019-01-01

## 2019-10-31 PROBLEM — E87.1 HYPONATREMIA: Status: ACTIVE | Noted: 2019-01-01

## 2019-10-31 NOTE — ED NOTES
Pt arrived via [facility], per report, pt fell at home tonight while trying to get to the bathroom with assit of his wife. Denies hitting his head and any LOC. Reported has been having increased falls over the past month, c/o tingling I hands and feet. Has chronic back pain. See assessments. Provider at bedside upon arrival. Call light placed within reach. Wife now at bedside

## 2019-10-31 NOTE — PLAN OF CARE
Patient hospitalized for generalized weakness and back pain. He arrived to the floor at approx. 0200 and is alert and oriented. He is frustrated with his current state and the amount of pain he is in. Patient is 2 assist with ww and gait belt and he is unable to walk (only pivot), Patient is also 2 assist to boost up in bed as he is unable to assist at all. Patient is voiding adequately, had xxl bm and a loose bm. He does complain of pain in his back. He was given Ultram which is his norm that he takes at home. Patient was given a 1L bolus when he arrived up here. Patient has a skin tear noted on his right elbow, multiple bruises, scrapes, and abrasions from falls prior to arrival here. He has incisions from previous surgeries as well. See PCS for skin assessment. LS are clear but dim in RLL. No supplemental O2 required. Bowel sounds audible and active. BP elevated and patient received hydralazine 5mg. This brought his BP down to 168/91. HR WNL. Afebrile. Bed in low position, brakes on, call light and urinal in reach. Bed alarm on. Patient has just fallen asleep.     Face to face report given with opportunity to observe patient.  Report given to AJITH Cardona.    Rosa Edwards RN  10/31/2019, 7:27 AM

## 2019-10-31 NOTE — PLAN OF CARE
Pt A&O, pleasant.  BP elevated as charted, morning BP meds given with decrease in BP as charted.  This afternoon /100-gave 5 mg PRN Apresoline with no change before getting transferred out to [facility].  Pt is a total cares with asst x 2-3, legs completely  Give out when up.  Unable to hold glass properly to drink.  Good appetite.  Gave Ultram for back and hip pain.      Alarms in use and call light in reach, makes needs known.    Patient discharged at approx 1430 PM via cart accompanied by other:wife and [place] ambulance to [facility] in [place].  Prescriptions - None ordered for discharge. All belongings sent with patient.     Discharge instructions reviewed with pt . Listed belongings gathered and returned to patient. na    Patient discharged to [facility].   Report called to Angeline CANSECO at  [facility]    Core Measures and Vaccines  Core Measures applicable during stay: No. If yes, state diagnosis: na  Pneumonia and Influenza given prior to discharge, if indicated: No    Surgical Patient   Surgical Procedures during stay: no  Did patient receive discharge instruction on wound care and recognition of infection symptoms? N/A    MISC  Follow up appointment made:  No  Home and hospital aquired medications returned to patient: N/A  Patient reports pain was well managed at discharge: Yes    .
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
## 2019-10-31 NOTE — TELEPHONE ENCOUNTER
Called regarding this patient with weakness for past 6 months progressing over past 6 weeks.  He has had multiple falls and 2 ED visits.  His medicine doctor informs me his reflexes are quite brisk in all extremities and there is asymmetric weakness R>L.    I see he has had both cervical and lumbar fusion surgery.   The description of his symptoms certainly warrants MRI imaging of his cervical spine and mary.  I would be concerned for a compressive myelopathy mostly but with weakness R>L would consider imaging brain to rule out small stroke as well.   I am told he has a spinal cord stimulator which may not be MRI compatible but he has had MRI of shoulder in July and in 2016 it appears.  Device type should be confirmed if MRI compatible as many stimulators are MRI conditional with 1.5T MRI imaging.    Would be reasonable to consider discussing with neurosurgery as well given his symptoms and history especially if unable to get MRI.      Av Spear, DO  Neurology

## 2019-10-31 NOTE — PROGRESS NOTES
Inpatient Occupational Therapy Evaluation    Name: Ramo Berry MRN# [id number]   Age: 59 year old YOB: 1960     Date of Consultation: 2019  Consultation is requested by:  Delvin Richards DO  Specific Consultation Request:  Evaluate and treat  Primary care provider: JUSTUS Santillan    General Information: Pt presented to hospital last night with recurrent falls. Pt was in [facility] 2 days ago and returned home. He reports having over 20 falls in the past 6 months.  Onset Date: pt reports he has been having increased symptoms the past 3 weeks.    History of Current Problem/Admission: Generalized muscle weakness [M62.81]  Inability to walk [R26.2]    Prior Level of Function: pt was independent until about 3 wks ago  Ambulation: 0 - Independent   Transferrin - Independent   Toiletin - Independent    Bathin - Assistive Equipment   Dressin - Independent   Eatin - Independent   Communication: 0 - Understands/communicates without difficulty  Swallowin - swallows foods/liquids without difficulty  Cognition: 0 - no cognitive issues reported    Fall history within the last 6 months: Yes    Current Living Situation: Patient has 2 stairs to enter the home. Patient has a railing on right side. Pt has upstairs and basement but does not use those floors often.     Current Equipment Used at Home: shower bench     Patient & Family Goals: Pt wants to know what is going on with him     Past Medical History:   Past Medical History:   Diagnosis Date     Anxiety state, unspecified 2012     Back Pain 2012     Cervical disc disease 2012     Chronic pain syndrome 2012     Community acquired bacterial pneumonia 3/27/2018     Fracture of thoracic spine (H) 2013     Loculated pleural effusion 2018    right       Past Surgical History:  Past Surgical History:   Procedure Laterality Date     ARTHROSCOPY SHOULDER, OPEN BICEP TENODESIS REPAIR, COMBINED  Right 4/21/2017    Procedure: COMBINED ARTHROSCOPY SHOULDER, OPEN BICEP TENODESIS REPAIR;;  Surgeon: Talha Farrell DO;  Location: [facility]     ARTHROSCOPY SHOULDER, OPEN ROTATOR CUFF REPAIR, COMBINED Right 4/21/2017    Procedure: COMBINED ARTHROSCOPY SHOULDER, OPEN ROTATOR CUFF REPAIR;  RIGHT SHOULDER ARTHROSCOPY WITH Open  REPAIR OF ROTATOR CUFFand BICEP TENDODESIS;  Surgeon: Talha Farrell DO;  Location: [facility] OR     C6 C7 cervical fusion       cataract extraction and lens implantation      Bilateral      cystoscopy with biopsies      hematuria     fusion L5 S1       L4 L5 spinal fusion       LUNG SURGERY Right 03/17/2018    Surgery at [facility] to remove infection. Started with pneumonia got worse.     MOUTH SURGERY       ORTHOPEDIC SURGERY Right 08/09/2016    rotator cuff surgery      THORACOTOMY  03/17/2018    right video assisted thoracoscopic surgery       Medications:   Current Facility-Administered Medications   Medication     acetaminophen (TYLENOL) tablet 650 mg     amLODIPine (NORVASC) tablet 10 mg     aspirin (ASA) tablet 325 mg     furosemide (LASIX) tablet 20 mg     gabapentin (NEURONTIN) capsule 200 mg     hydrALAZINE (APRESOLINE) injection 5 mg     [START ON 11/1/2019] influenza recomb quadrivalent PF (FLUBLOK) injection 0.5 mL     lidocaine (LMX4) kit     lidocaine 1 % 0.1-1 mL     melatonin tablet 1 mg     metoprolol tartrate (LOPRESSOR) tablet 50 mg     naloxone (NARCAN) injection 0.1-0.4 mg     nicotine (NICODERM CQ) 21 MG/24HR 24 hr patch 1 patch     nicotine Patch in Place     nicotine patch REMOVAL     omeprazole (priLOSEC) CR capsule 40 mg     ondansetron (ZOFRAN-ODT) ODT tab 4 mg    Or     ondansetron (ZOFRAN) injection 4 mg     sodium chloride (PF) 0.9% PF flush 3 mL     sodium chloride (PF) 0.9% PF flush 3 mL     traMADol (ULTRAM) tablet 50 mg       Weight Bearing Status: full weight bearing     Cognitive Status Examination:  Orientation: awake and alert and oriented to time, place and  person  Level of Consciousness: alert  Follows Commands and Answers Questions: 100% of the time  Personal Safety and Judgement: Intact  Memory: Immediate recall intact, Short-term memory intact and Long-term memory intact  Comments: Pt reports no concerns with memory    Pain:   Currently in pain? Yes  Pain Location? low back and right hip  Pain Ratin    Occupational Therapy Evaluation:   Integumentary/Edema: normal   Posture: normal  Range of Motion: WFL   Strength: L Shoulder flx 2+/5 elbow and R UE grossly 3/5  Hand Strength: decreased B hand strength  Muscle Tone Assessment: normal tone  Coordination: pt has poor coordination in B UEs R is slightly worse than L     Mobility:   Transfer Skills: contact guard   Bed to Chair/Chair to Bed: min A  Sit to Stand: contact guard  Tub/Shower: not assessed  Toilet Transfer: min A with use of walker   Balance: fair     ADLs:   Bathing: not assessed, has been getting assistance at home past 2 weeks  Upper Body Dressing: min A  Lower Body Dressing: mod-max A  Toileting: independent  Grooming: min A due to impaired coordination  Eating/Self Feeding: independent    IADLs:   Previous Responsibilities of the Patient: Yard Work and Driving   Comments: pt reports he has not driven for past 3 weeks     Activities of Daily Living Analysis:   Impairments Contributing to Impaired Activities of Daily Living: Balance impaired , coordination impaired , motor control impaired and pain  Comments: pt's B UE coordination is limiting his self cares    Occupational Therapy Interventions: ADL Retraining , fine motor control coordination , motor coordination training , ROM  and strengthening     Clinical Impressions:  Criteria for Skilled Therapeutic Intervention Met: Yes, treatmtransfersent indicated  OT Diagnosis: decreased ADL function  Influenced by the following impairments: coordination, strength  Functional limitations due to impairment: dressing, grooming, transfers  Clinical  presentation: Evolving/Changing  Clinical presentation rationale: Pt has severe coordination deficits  Clinical Decision making (complexity): Moderate Complexity  Frequency: 5 times/week  Predicted Duration of Therapy Intervention (days/wks): 5 days  Anticipated Discharge Disposition: Transitional Care Facility , Acute Rehab Facility  and TBD by pt's progress  Anticipated Equipment Needs at Discharge: TBD  Risks and Benefits of therapy have been explained: Yes  Patient, Family & other staff in agreement with plan of care: Yes  Clinical Impression Comments: Pt demonstrates UE ataxia which is more pronounced in L UE but present bilaterally. At current level of functioning pt is not able to independently mange self cares and transfers safely.    Total Eval Time: 25

## 2019-10-31 NOTE — PROVIDER NOTIFICATION
Notified MD of /105 with PRN Apresoline order.  MD says to give scheduled BP meds only at this time.  I will reassess BP's.  Pt also states he has a bone growth stimulator and cannot have an MRI as scheduled today- MD aware and I also verified with MRI and it cannot be safely done.

## 2019-10-31 NOTE — PLAN OF CARE
[facility] Inpatient Admission Note:    Patient admitted to 3222/3222-1 at approximately 0155 via cart accompanied by spouse from emergency room . Report received from AJITH Altamirano in SBAR format at 01:38 via telephone. Patient transferred to bed via slide board.. Patient is alert and oriented X 3, reports pain; rates at 6 on 0-10 scale.  Patient oriented to room, unit, hourly rounding, and plan of care. Explained admission packet and patient handbook with patient bill of rights brochure. Will continue to monitor and document as needed.     Inpatient Nursing criteria listed below was met:    Health care directives status obtained and documented: Yes    Care Everywhere authorization obtained No    MRSA swab completed for patient 65 years and older: N/A    Patient identifies a surrogate decision maker: Yes If yes, who: Lea, wife  Contact Information: see facesheet     If initial lactic acid >2.0, repeat lactic acid drawn within one hour of arrival to unit: NA. If no, state reason: N/A    Vaccination assessment and education completed: Yes   Vaccinations received prior to admission: Pneumovax no  Influenza(seasonal)  NO   Vaccination(s) ordered: influenza vaccine    Clergy visit ordered if patient requests: N/A    Skin issues/needs documented: Yes    Isolation Patient: no Education given, correct sign in place and documentation row added to PCS:  N/A    Fall Prevention Yes: Care plan updated, education given and documented, sticker and magnet in place: Yes    Care Plan initiated: Yes    Education Documented (including assessment): Yes    Patient has discharge needs : Yes If yes, please explain: pt. Unable to get around house. Sleeping on mattress on floor, moving around on computer chair with wheels. Quality of life diminished. Wife having tro try and lift patient.

## 2019-10-31 NOTE — PLAN OF CARE
Discharge Planner OT   Patient plan for discharge: TBD  Current status: min to Max A for ADLs and transfers  Barriers to return to prior living situation: safey. Pt has decreased strength and coordination  Recommendations for discharge: TBD by progress  Rationale for recommendations: Pt was fatigued when evaluated, however at time of evaluation he was requiring assistance for ADLs and transfers due to decreased strength and impaired coordination.       Entered by: More Ellison 10/31/2019 2:23 PM

## 2019-10-31 NOTE — PROGRESS NOTES
"Assessment completed see flowsheet.    LOC: alert and oriented, pleasant, sometimes deferred topics to his wife who he expects to arrive shortly  Others present: Patient alone    Dx: recurrent falls    Lives with: his wife Lea  Living at:  Home in [place]  Transportation: YES He is a licensed  but has not been driving; Lea drives    Primary PCP: JUSTUS Santillan; he also see's Dr Schwarz in [place]  Insurance:  Medicare    Support System:  family  Homecare/PCA: none; but asks that I talk with Lea more about setting up these services as they are thinking about establishing this  /County Services:   He thinks there may be a  but he is not sure who nor from what entity  : NO      How was the [facility] notification completed: na    Health Care Directive: NO but he would like the information  Guardian: no  POA: no    Pharmacy: [facility], [place]  Meds management: YES he manages independently and feels this is going well; Lea does some oversight     Adequate Resources for needs (housing, utilities, food/med): YES  Household chores: Lea has done most in the past two weeks  Work/community/social activity: not asked     ADLs: Lea has been helping with all  Ambulation:has canes and walkers, but in the past two weeks Lea has been assisting him  Nutrition: Lea has been doing the shopping and meal prep  Sleep: sleeps in a bed at night    Equipment used: as previously listed     Oxygen supplier: no      Does the supplier have valid oxygen orders: na    Mental health: has anxiety, Dr Schwarz has referred him to a psychiatrist in [place] to work with, the appointment is in December   Substance abuse: smokes and is not interested in quitting; alcohol use is \"hardly anything\", denies any street drug use  Exposure to violence/abuse: not asked  Stressors: worries about what his diagnosis might be    Able to Return to Prior Living Arrangements: to be determined    Choice of Vendor: " na    Barriers: none known    IKE: none    Plan: to be determined

## 2019-12-30 NOTE — TELEPHONE ENCOUNTER
Pt called, requesting refill of norco. Pt states that he had recent neck surgery with a doctor at [facility]. Pt is difficult to understand. Explained to pt that we have no record of this medication in our system and he should call his surgeon for a refill. Pt hung up on this writer.

## 2019-12-31 NOTE — TELEPHONE ENCOUNTER
Pharmacy requesting 7.5-325  norco 5-325      Last Written Prescription Date:  4/5/19  Last Fill Quantity: 20,   # refills: 0  Last Office Visit: 4/5/19  Future Office visit:       Routing refill request to provider for review/approval because:  Drug not on the [facility], [facility] or [facility] refill protocol or controlled substance

## 2020-01-01 ENCOUNTER — APPOINTMENT (OUTPATIENT)
Dept: GENERAL RADIOLOGY | Facility: HOSPITAL | Age: 60
DRG: 208 | End: 2020-01-01
Attending: NURSE PRACTITIONER
Payer: COMMERCIAL

## 2020-01-01 ENCOUNTER — APPOINTMENT (OUTPATIENT)
Dept: OCCUPATIONAL THERAPY | Facility: HOSPITAL | Age: 60
DRG: 208 | End: 2020-01-01
Attending: INTERNAL MEDICINE
Payer: COMMERCIAL

## 2020-01-01 ENCOUNTER — HOSPITAL ENCOUNTER (INPATIENT)
Facility: HOSPITAL | Age: 60
LOS: 2 days | DRG: 208 | End: 2020-01-23
Attending: FAMILY MEDICINE | Admitting: INTERNAL MEDICINE
Payer: COMMERCIAL

## 2020-01-01 ENCOUNTER — ANESTHESIA (OUTPATIENT)
Dept: SURGERY | Facility: HOSPITAL | Age: 60
DRG: 208 | End: 2020-01-01
Payer: COMMERCIAL

## 2020-01-01 ENCOUNTER — HOSPITAL ENCOUNTER (EMERGENCY)
Facility: HOSPITAL | Age: 60
Discharge: LEFT BEFORE DISCHARGE | End: 2020-01-01
Admitting: FAMILY MEDICINE

## 2020-01-01 ENCOUNTER — APPOINTMENT (OUTPATIENT)
Dept: CT IMAGING | Facility: HOSPITAL | Age: 60
DRG: 208 | End: 2020-01-01
Attending: NURSE PRACTITIONER
Payer: COMMERCIAL

## 2020-01-01 ENCOUNTER — TELEPHONE (OUTPATIENT)
Dept: INTERNAL MEDICINE | Facility: OTHER | Age: 60
End: 2020-01-01

## 2020-01-01 ENCOUNTER — APPOINTMENT (OUTPATIENT)
Dept: PHYSICAL THERAPY | Facility: HOSPITAL | Age: 60
DRG: 208 | End: 2020-01-01
Attending: INTERNAL MEDICINE
Payer: COMMERCIAL

## 2020-01-01 ENCOUNTER — APPOINTMENT (OUTPATIENT)
Dept: GENERAL RADIOLOGY | Facility: HOSPITAL | Age: 60
DRG: 208 | End: 2020-01-01
Attending: FAMILY MEDICINE
Payer: COMMERCIAL

## 2020-01-01 ENCOUNTER — ANESTHESIA EVENT (OUTPATIENT)
Dept: SURGERY | Facility: HOSPITAL | Age: 60
DRG: 208 | End: 2020-01-01
Payer: COMMERCIAL

## 2020-01-01 ENCOUNTER — APPOINTMENT (OUTPATIENT)
Dept: GENERAL RADIOLOGY | Facility: HOSPITAL | Age: 60
DRG: 208 | End: 2020-01-01
Attending: SURGERY
Payer: COMMERCIAL

## 2020-01-01 ENCOUNTER — APPOINTMENT (OUTPATIENT)
Dept: PHYSICAL THERAPY | Facility: HOSPITAL | Age: 60
DRG: 208 | End: 2020-01-01
Payer: COMMERCIAL

## 2020-01-01 ENCOUNTER — TELEPHONE (OUTPATIENT)
Dept: FAMILY MEDICINE | Facility: OTHER | Age: 60
End: 2020-01-01

## 2020-01-01 VITALS
TEMPERATURE: 97.2 F | RESPIRATION RATE: 16 BRPM | OXYGEN SATURATION: 97 % | DIASTOLIC BLOOD PRESSURE: 110 MMHG | HEART RATE: 93 BPM | SYSTOLIC BLOOD PRESSURE: 152 MMHG

## 2020-01-01 VITALS
TEMPERATURE: 96.3 F | HEIGHT: 70 IN | SYSTOLIC BLOOD PRESSURE: 40 MMHG | DIASTOLIC BLOOD PRESSURE: 22 MMHG | HEART RATE: 113 BPM | OXYGEN SATURATION: 92 % | WEIGHT: 137.57 LBS | BODY MASS INDEX: 19.69 KG/M2

## 2020-01-01 DIAGNOSIS — M62.81 GENERALIZED MUSCLE WEAKNESS: ICD-10-CM

## 2020-01-01 DIAGNOSIS — K21.9 GASTROESOPHAGEAL REFLUX DISEASE WITHOUT ESOPHAGITIS: ICD-10-CM

## 2020-01-01 DIAGNOSIS — R11.0 CHRONIC NAUSEA: ICD-10-CM

## 2020-01-01 DIAGNOSIS — D50.0 IRON DEFICIENCY ANEMIA DUE TO CHRONIC BLOOD LOSS: Primary | ICD-10-CM

## 2020-01-01 DIAGNOSIS — D50.0 IRON DEFICIENCY ANEMIA DUE TO CHRONIC BLOOD LOSS: ICD-10-CM

## 2020-01-01 DIAGNOSIS — E87.6 HYPOKALEMIA: ICD-10-CM

## 2020-01-01 LAB
ABO + RH BLD: NORMAL
ABO + RH BLD: NORMAL
ALBUMIN SERPL-MCNC: 1.1 G/DL (ref 3.4–5)
ALBUMIN SERPL-MCNC: 1.2 G/DL (ref 3.4–5)
ALBUMIN SERPL-MCNC: 1.7 G/DL (ref 3.4–5)
ALBUMIN UR-MCNC: 10 MG/DL
ALP SERPL-CCNC: 100 U/L (ref 40–150)
ALP SERPL-CCNC: 133 U/L (ref 40–150)
ALP SERPL-CCNC: 98 U/L (ref 40–150)
ALT SERPL W P-5'-P-CCNC: 20 U/L (ref 0–70)
ALT SERPL W P-5'-P-CCNC: 21 U/L (ref 0–70)
ALT SERPL W P-5'-P-CCNC: 24 U/L (ref 0–70)
ANION GAP SERPL CALCULATED.3IONS-SCNC: 14 MMOL/L (ref 3–14)
ANION GAP SERPL CALCULATED.3IONS-SCNC: 6 MMOL/L (ref 3–14)
ANION GAP SERPL CALCULATED.3IONS-SCNC: 9 MMOL/L (ref 3–14)
APPEARANCE UR: CLEAR
AST SERPL W P-5'-P-CCNC: 24 U/L (ref 0–45)
AST SERPL W P-5'-P-CCNC: 27 U/L (ref 0–45)
AST SERPL W P-5'-P-CCNC: 35 U/L (ref 0–45)
BACTERIA #/AREA URNS HPF: ABNORMAL /HPF
BACTERIA SPEC CULT: NORMAL
BACTERIA SPEC CULT: NORMAL
BASE DEFICIT BLDA-SCNC: 0.1 MMOL/L
BASE DEFICIT BLDA-SCNC: 2.8 MMOL/L
BASE EXCESS BLDA CALC-SCNC: 2.1 MMOL/L
BASE EXCESS BLDA CALC-SCNC: 2.5 MMOL/L
BASOPHILS # BLD AUTO: 0 10E9/L (ref 0–0.2)
BASOPHILS NFR BLD AUTO: 0.2 %
BILIRUB SERPL-MCNC: 0.2 MG/DL (ref 0.2–1.3)
BILIRUB SERPL-MCNC: 0.2 MG/DL (ref 0.2–1.3)
BILIRUB SERPL-MCNC: 0.4 MG/DL (ref 0.2–1.3)
BILIRUB UR QL STRIP: NEGATIVE
BLD GP AB SCN SERPL QL: NORMAL
BLOOD BANK CMNT PATIENT-IMP: NORMAL
BUN SERPL-MCNC: 10 MG/DL (ref 7–30)
BUN SERPL-MCNC: 11 MG/DL (ref 7–30)
BUN SERPL-MCNC: 13 MG/DL (ref 7–30)
CALCIUM SERPL-MCNC: 6.3 MG/DL (ref 8.5–10.1)
CALCIUM SERPL-MCNC: 6.5 MG/DL (ref 8.5–10.1)
CALCIUM SERPL-MCNC: 7.3 MG/DL (ref 8.5–10.1)
CHLORIDE SERPL-SCNC: 102 MMOL/L (ref 94–109)
CHLORIDE SERPL-SCNC: 104 MMOL/L (ref 94–109)
CHLORIDE SERPL-SCNC: 90 MMOL/L (ref 94–109)
CK SERPL-CCNC: 62 U/L (ref 30–300)
CO2 SERPL-SCNC: 24 MMOL/L (ref 20–32)
CO2 SERPL-SCNC: 28 MMOL/L (ref 20–32)
CO2 SERPL-SCNC: 29 MMOL/L (ref 20–32)
COLOR UR AUTO: YELLOW
CREAT SERPL-MCNC: 0.51 MG/DL (ref 0.66–1.25)
CREAT SERPL-MCNC: 0.54 MG/DL (ref 0.66–1.25)
CREAT SERPL-MCNC: 0.61 MG/DL (ref 0.66–1.25)
DIFFERENTIAL METHOD BLD: ABNORMAL
EOSINOPHIL # BLD AUTO: 0 10E9/L (ref 0–0.7)
EOSINOPHIL NFR BLD AUTO: 0 %
ERYTHROCYTE [DISTWIDTH] IN BLOOD BY AUTOMATED COUNT: 17.1 % (ref 10–15)
ERYTHROCYTE [DISTWIDTH] IN BLOOD BY AUTOMATED COUNT: 17.1 % (ref 10–15)
ERYTHROCYTE [DISTWIDTH] IN BLOOD BY AUTOMATED COUNT: 17.4 % (ref 10–15)
GFR SERPL CREATININE-BSD FRML MDRD: >90 ML/MIN/{1.73_M2}
GLUCOSE BLDC GLUCOMTR-MCNC: 111 MG/DL (ref 70–99)
GLUCOSE BLDC GLUCOMTR-MCNC: 76 MG/DL (ref 70–99)
GLUCOSE SERPL-MCNC: 122 MG/DL (ref 70–99)
GLUCOSE SERPL-MCNC: 63 MG/DL (ref 70–99)
GLUCOSE SERPL-MCNC: 93 MG/DL (ref 70–99)
GLUCOSE UR STRIP-MCNC: NEGATIVE MG/DL
GRAM STN SPEC: ABNORMAL
GRAM STN SPEC: NORMAL
GRAM STN SPEC: NORMAL
HCO3 BLD-SCNC: 24 MMOL/L (ref 21–28)
HCO3 BLD-SCNC: 28 MMOL/L (ref 21–28)
HCT VFR BLD AUTO: 24.1 % (ref 40–53)
HCT VFR BLD AUTO: 24.8 % (ref 40–53)
HCT VFR BLD AUTO: 30 % (ref 40–53)
HEMOCCULT SP1 STL QL: POSITIVE
HGB BLD-MCNC: 7.6 G/DL (ref 13.3–17.7)
HGB BLD-MCNC: 7.6 G/DL (ref 13.3–17.7)
HGB BLD-MCNC: 9.5 G/DL (ref 13.3–17.7)
HGB UR QL STRIP: ABNORMAL
IMM GRANULOCYTES # BLD: 0.1 10E9/L (ref 0–0.4)
IMM GRANULOCYTES NFR BLD: 1.1 %
IRON SERPL-MCNC: 9 UG/DL (ref 35–180)
KETONES UR STRIP-MCNC: 5 MG/DL
LACTATE BLD-SCNC: 1.2 MMOL/L (ref 0.7–2)
LACTATE BLD-SCNC: 1.4 MMOL/L (ref 0.7–2)
LACTATE BLD-SCNC: 2.6 MMOL/L (ref 0.7–2)
LEUKOCYTE ESTERASE UR QL STRIP: NEGATIVE
LYMPHOCYTES # BLD AUTO: 0.5 10E9/L (ref 0.8–5.3)
LYMPHOCYTES NFR BLD AUTO: 6.6 %
MAGNESIUM SERPL-MCNC: 1.5 MG/DL (ref 1.6–2.3)
MAGNESIUM SERPL-MCNC: 1.9 MG/DL (ref 1.6–2.3)
MCH RBC QN AUTO: 26.2 PG (ref 26.5–33)
MCH RBC QN AUTO: 26.2 PG (ref 26.5–33)
MCH RBC QN AUTO: 26.3 PG (ref 26.5–33)
MCHC RBC AUTO-ENTMCNC: 30.6 G/DL (ref 31.5–36.5)
MCHC RBC AUTO-ENTMCNC: 31.5 G/DL (ref 31.5–36.5)
MCHC RBC AUTO-ENTMCNC: 31.7 G/DL (ref 31.5–36.5)
MCV RBC AUTO: 83 FL (ref 78–100)
MCV RBC AUTO: 83 FL (ref 78–100)
MCV RBC AUTO: 86 FL (ref 78–100)
MONOCYTES # BLD AUTO: 1.2 10E9/L (ref 0–1.3)
MONOCYTES NFR BLD AUTO: 14.6 %
NEUTROPHILS # BLD AUTO: 6.3 10E9/L (ref 1.6–8.3)
NEUTROPHILS NFR BLD AUTO: 77.5 %
NITRATE UR QL: NEGATIVE
NRBC # BLD AUTO: 0 10*3/UL
NRBC BLD AUTO-RTO: 0 /100
O2/TOTAL GAS SETTING VFR VENT: ABNORMAL %
OXYHGB MFR BLD: 42 % (ref 92–100)
OXYHGB MFR BLD: 86 % (ref 92–100)
OXYHGB MFR BLD: 89 % (ref 92–100)
OXYHGB MFR BLD: 98 % (ref 92–100)
PCO2 BLD: 48 MM HG (ref 35–45)
PCO2 BLD: 50 MM HG (ref 35–45)
PCO2 BLD: 52 MM HG (ref 35–45)
PCO2 BLD: 65 MM HG (ref 35–45)
PH BLD: 7.24 PH (ref 7.35–7.45)
PH BLD: 7.3 PH (ref 7.35–7.45)
PH BLD: 7.34 PH (ref 7.35–7.45)
PH BLD: 7.36 PH (ref 7.35–7.45)
PH UR STRIP: 6 PH (ref 4.7–8)
PLATELET # BLD AUTO: 245 10E9/L (ref 150–450)
PLATELET # BLD AUTO: 260 10E9/L (ref 150–450)
PLATELET # BLD AUTO: 297 10E9/L (ref 150–450)
PO2 BLD: 166 MM HG (ref 80–105)
PO2 BLD: 30 MM HG (ref 80–105)
PO2 BLD: 56 MM HG (ref 80–105)
PO2 BLD: 65 MM HG (ref 80–105)
POTASSIUM SERPL-SCNC: 2.8 MMOL/L (ref 3.4–5.3)
POTASSIUM SERPL-SCNC: 3.3 MMOL/L (ref 3.4–5.3)
POTASSIUM SERPL-SCNC: 3.5 MMOL/L (ref 3.4–5.3)
POTASSIUM SERPL-SCNC: 3.8 MMOL/L (ref 3.4–5.3)
POTASSIUM SERPL-SCNC: 3.8 MMOL/L (ref 3.4–5.3)
PROCALCITONIN SERPL-MCNC: 3.12 NG/ML
PROCALCITONIN SERPL-MCNC: 5.08 NG/ML
PROT SERPL-MCNC: 4.7 G/DL (ref 6.8–8.8)
PROT SERPL-MCNC: 5.1 G/DL (ref 6.8–8.8)
PROT SERPL-MCNC: 6.2 G/DL (ref 6.8–8.8)
RBC # BLD AUTO: 2.89 10E12/L (ref 4.4–5.9)
RBC # BLD AUTO: 2.9 10E12/L (ref 4.4–5.9)
RBC # BLD AUTO: 3.63 10E12/L (ref 4.4–5.9)
RBC #/AREA URNS AUTO: 1 /HPF (ref 0–2)
SODIUM SERPL-SCNC: 133 MMOL/L (ref 133–144)
SODIUM SERPL-SCNC: 136 MMOL/L (ref 133–144)
SODIUM SERPL-SCNC: 137 MMOL/L (ref 133–144)
SOURCE: ABNORMAL
SP GR UR STRIP: 1.01 (ref 1–1.03)
SPECIMEN EXP DATE BLD: NORMAL
SPECIMEN SOURCE: ABNORMAL
SPECIMEN SOURCE: NORMAL
SPECIMEN SOURCE: NORMAL
TROPONIN I SERPL-MCNC: <0.015 UG/L (ref 0–0.04)
UROBILINOGEN UR STRIP-MCNC: NORMAL MG/DL (ref 0–2)
WBC # BLD AUTO: 5.4 10E9/L (ref 4–11)
WBC # BLD AUTO: 7.6 10E9/L (ref 4–11)
WBC # BLD AUTO: 8.1 10E9/L (ref 4–11)
WBC #/AREA URNS AUTO: 2 /HPF (ref 0–5)

## 2020-01-01 PROCEDURE — 25000128 H RX IP 250 OP 636: Performed by: INTERNAL MEDICINE

## 2020-01-01 PROCEDURE — 99285 EMERGENCY DEPT VISIT HI MDM: CPT | Mod: Z6 | Performed by: FAMILY MEDICINE

## 2020-01-01 PROCEDURE — 87040 BLOOD CULTURE FOR BACTERIA: CPT | Performed by: FAMILY MEDICINE

## 2020-01-01 PROCEDURE — 0BH17EZ INSERTION OF ENDOTRACHEAL AIRWAY INTO TRACHEA, VIA NATURAL OR ARTIFICIAL OPENING: ICD-10-PCS | Performed by: NURSE ANESTHETIST, CERTIFIED REGISTERED

## 2020-01-01 PROCEDURE — 40000268 ZZH STATISTIC NO CHARGES

## 2020-01-01 PROCEDURE — 97166 OT EVAL MOD COMPLEX 45 MIN: CPT | Mod: GO

## 2020-01-01 PROCEDURE — 94640 AIRWAY INHALATION TREATMENT: CPT | Mod: 76

## 2020-01-01 PROCEDURE — 82805 BLOOD GASES W/O2 SATURATION: CPT | Performed by: NURSE PRACTITIONER

## 2020-01-01 PROCEDURE — 5A1935Z RESPIRATORY VENTILATION, LESS THAN 24 CONSECUTIVE HOURS: ICD-10-PCS | Performed by: INTERNAL MEDICINE

## 2020-01-01 PROCEDURE — 94002 VENT MGMT INPAT INIT DAY: CPT

## 2020-01-01 PROCEDURE — 25800030 ZZH RX IP 258 OP 636: Performed by: FAMILY MEDICINE

## 2020-01-01 PROCEDURE — 40000275 ZZH STATISTIC RCP TIME EA 10 MIN

## 2020-01-01 PROCEDURE — 25000125 ZZHC RX 250: Performed by: INTERNAL MEDICINE

## 2020-01-01 PROCEDURE — 40000986 XR CHEST 1 VW

## 2020-01-01 PROCEDURE — 81001 URINALYSIS AUTO W/SCOPE: CPT | Performed by: FAMILY MEDICINE

## 2020-01-01 PROCEDURE — 99238 HOSP IP/OBS DSCHRG MGMT 30/<: CPT | Performed by: INTERNAL MEDICINE

## 2020-01-01 PROCEDURE — 74177 CT ABD & PELVIS W/CONTRAST: CPT | Mod: TC

## 2020-01-01 PROCEDURE — 84145 PROCALCITONIN (PCT): CPT | Performed by: NURSE PRACTITIONER

## 2020-01-01 PROCEDURE — 86900 BLOOD TYPING SEROLOGIC ABO: CPT | Performed by: NURSE PRACTITIONER

## 2020-01-01 PROCEDURE — 37000008 ZZH ANESTHESIA TECHNICAL FEE, 1ST 30 MIN: Performed by: SURGERY

## 2020-01-01 PROCEDURE — 93010 ELECTROCARDIOGRAM REPORT: CPT | Performed by: INTERNAL MEDICINE

## 2020-01-01 PROCEDURE — 82550 ASSAY OF CK (CPK): CPT | Performed by: FAMILY MEDICINE

## 2020-01-01 PROCEDURE — 36415 COLL VENOUS BLD VENIPUNCTURE: CPT | Performed by: FAMILY MEDICINE

## 2020-01-01 PROCEDURE — C1726 CATH, BAL DIL, NON-VASCULAR: HCPCS | Performed by: SURGERY

## 2020-01-01 PROCEDURE — 87205 SMEAR GRAM STAIN: CPT | Performed by: INTERNAL MEDICINE

## 2020-01-01 PROCEDURE — 80053 COMPREHEN METABOLIC PANEL: CPT | Performed by: FAMILY MEDICINE

## 2020-01-01 PROCEDURE — 25000132 ZZH RX MED GY IP 250 OP 250 PS 637: Mod: GY | Performed by: INTERNAL MEDICINE

## 2020-01-01 PROCEDURE — 82274 ASSAY TEST FOR BLOOD FECAL: CPT | Performed by: INTERNAL MEDICINE

## 2020-01-01 PROCEDURE — C9113 INJ PANTOPRAZOLE SODIUM, VIA: HCPCS | Performed by: INTERNAL MEDICINE

## 2020-01-01 PROCEDURE — 96365 THER/PROPH/DIAG IV INF INIT: CPT | Mod: XS

## 2020-01-01 PROCEDURE — 36415 COLL VENOUS BLD VENIPUNCTURE: CPT | Performed by: NURSE PRACTITIONER

## 2020-01-01 PROCEDURE — 25800030 ZZH RX IP 258 OP 636: Performed by: SURGERY

## 2020-01-01 PROCEDURE — 94640 AIRWAY INHALATION TREATMENT: CPT

## 2020-01-01 PROCEDURE — 25800030 ZZH RX IP 258 OP 636: Performed by: NURSE PRACTITIONER

## 2020-01-01 PROCEDURE — 27210794 ZZH OR GENERAL SUPPLY STERILE: Performed by: SURGERY

## 2020-01-01 PROCEDURE — 99223 1ST HOSP IP/OBS HIGH 75: CPT | Performed by: INTERNAL MEDICINE

## 2020-01-01 PROCEDURE — 25000128 H RX IP 250 OP 636: Performed by: NURSE PRACTITIONER

## 2020-01-01 PROCEDURE — 80053 COMPREHEN METABOLIC PANEL: CPT | Performed by: INTERNAL MEDICINE

## 2020-01-01 PROCEDURE — 82805 BLOOD GASES W/O2 SATURATION: CPT | Performed by: INTERNAL MEDICINE

## 2020-01-01 PROCEDURE — 86901 BLOOD TYPING SEROLOGIC RH(D): CPT | Performed by: NURSE PRACTITIONER

## 2020-01-01 PROCEDURE — 25800030 ZZH RX IP 258 OP 636: Performed by: INTERNAL MEDICINE

## 2020-01-01 PROCEDURE — 83735 ASSAY OF MAGNESIUM: CPT | Performed by: NURSE PRACTITIONER

## 2020-01-01 PROCEDURE — 36415 COLL VENOUS BLD VENIPUNCTURE: CPT | Performed by: INTERNAL MEDICINE

## 2020-01-01 PROCEDURE — 83735 ASSAY OF MAGNESIUM: CPT | Performed by: INTERNAL MEDICINE

## 2020-01-01 PROCEDURE — 88305 TISSUE EXAM BY PATHOLOGIST: CPT | Mod: TC | Performed by: SURGERY

## 2020-01-01 PROCEDURE — 94003 VENT MGMT INPAT SUBQ DAY: CPT

## 2020-01-01 PROCEDURE — 43239 EGD BIOPSY SINGLE/MULTIPLE: CPT | Mod: 59 | Performed by: SURGERY

## 2020-01-01 PROCEDURE — 03HY32Z INSERTION OF MONITORING DEVICE INTO UPPER ARTERY, PERCUTANEOUS APPROACH: ICD-10-PCS | Performed by: NURSE ANESTHETIST, CERTIFIED REGISTERED

## 2020-01-01 PROCEDURE — 83605 ASSAY OF LACTIC ACID: CPT | Performed by: INTERNAL MEDICINE

## 2020-01-01 PROCEDURE — 87205 SMEAR GRAM STAIN: CPT | Performed by: SURGERY

## 2020-01-01 PROCEDURE — 84132 ASSAY OF SERUM POTASSIUM: CPT | Performed by: NURSE PRACTITIONER

## 2020-01-01 PROCEDURE — 84484 ASSAY OF TROPONIN QUANT: CPT | Performed by: FAMILY MEDICINE

## 2020-01-01 PROCEDURE — 94799 UNLISTED PULMONARY SVC/PX: CPT

## 2020-01-01 PROCEDURE — 99233 SBSQ HOSP IP/OBS HIGH 50: CPT | Performed by: NURSE PRACTITIONER

## 2020-01-01 PROCEDURE — 25000128 H RX IP 250 OP 636

## 2020-01-01 PROCEDURE — 25500064 ZZH RX 255 OP 636: Performed by: RADIOLOGY

## 2020-01-01 PROCEDURE — 31622 DX BRONCHOSCOPE/WASH: CPT | Mod: 79 | Performed by: SURGERY

## 2020-01-01 PROCEDURE — 20000003 ZZH R&B ICU

## 2020-01-01 PROCEDURE — 25000132 ZZH RX MED GY IP 250 OP 250 PS 637: Mod: GY | Performed by: NURSE PRACTITIONER

## 2020-01-01 PROCEDURE — 99232 SBSQ HOSP IP/OBS MODERATE 35: CPT | Performed by: UROLOGY

## 2020-01-01 PROCEDURE — 40000986 XR CHEST PORT 1 VW

## 2020-01-01 PROCEDURE — 00000146 ZZHCL STATISTIC GLUCOSE BY METER IP

## 2020-01-01 PROCEDURE — 25000128 H RX IP 250 OP 636: Performed by: SURGERY

## 2020-01-01 PROCEDURE — 99285 EMERGENCY DEPT VISIT HI MDM: CPT | Mod: 25

## 2020-01-01 PROCEDURE — 85027 COMPLETE CBC AUTOMATED: CPT | Performed by: INTERNAL MEDICINE

## 2020-01-01 PROCEDURE — 12000000 ZZH R&B MED SURG/OB

## 2020-01-01 PROCEDURE — 0BC78ZZ EXTIRPATION OF MATTER FROM LEFT MAIN BRONCHUS, VIA NATURAL OR ARTIFICIAL OPENING ENDOSCOPIC: ICD-10-PCS | Performed by: SURGERY

## 2020-01-01 PROCEDURE — 25000125 ZZHC RX 250: Performed by: NURSE PRACTITIONER

## 2020-01-01 PROCEDURE — 85025 COMPLETE CBC W/AUTO DIFF WBC: CPT | Performed by: FAMILY MEDICINE

## 2020-01-01 PROCEDURE — 43239 EGD BIOPSY SINGLE/MULTIPLE: CPT | Performed by: NURSE ANESTHETIST, CERTIFIED REGISTERED

## 2020-01-01 PROCEDURE — 97530 THERAPEUTIC ACTIVITIES: CPT | Mod: GO

## 2020-01-01 PROCEDURE — 43245 EGD DILATE STRICTURE: CPT | Mod: 52 | Performed by: SURGERY

## 2020-01-01 PROCEDURE — 71045 X-RAY EXAM CHEST 1 VIEW: CPT | Mod: TC

## 2020-01-01 PROCEDURE — 0D798ZZ DILATION OF DUODENUM, VIA NATURAL OR ARTIFICIAL OPENING ENDOSCOPIC: ICD-10-PCS | Performed by: SURGERY

## 2020-01-01 PROCEDURE — 97530 THERAPEUTIC ACTIVITIES: CPT | Mod: GP | Performed by: PHYSICAL THERAPIST

## 2020-01-01 PROCEDURE — 86850 RBC ANTIBODY SCREEN: CPT | Performed by: NURSE PRACTITIONER

## 2020-01-01 PROCEDURE — 36000052 ZZH SURGERY LEVEL 2 EA 15 ADDTL MIN: Performed by: SURGERY

## 2020-01-01 PROCEDURE — 83605 ASSAY OF LACTIC ACID: CPT | Performed by: FAMILY MEDICINE

## 2020-01-01 PROCEDURE — 31622 DX BRONCHOSCOPE/WASH: CPT

## 2020-01-01 PROCEDURE — 25000125 ZZHC RX 250: Performed by: SURGERY

## 2020-01-01 PROCEDURE — 37000009 ZZH ANESTHESIA TECHNICAL FEE, EACH ADDTL 15 MIN: Performed by: SURGERY

## 2020-01-01 PROCEDURE — 40000306 ZZH STATISTIC PRE PROC ASSESS II: Performed by: SURGERY

## 2020-01-01 PROCEDURE — 36000050 ZZH SURGERY LEVEL 2 1ST 30 MIN: Performed by: SURGERY

## 2020-01-01 PROCEDURE — 25000125 ZZHC RX 250: Performed by: NURSE ANESTHETIST, CERTIFIED REGISTERED

## 2020-01-01 PROCEDURE — 25000128 H RX IP 250 OP 636: Performed by: FAMILY MEDICINE

## 2020-01-01 PROCEDURE — 99233 SBSQ HOSP IP/OBS HIGH 50: CPT | Mod: 25 | Performed by: SURGERY

## 2020-01-01 PROCEDURE — 74018 RADEX ABDOMEN 1 VIEW: CPT | Mod: TC

## 2020-01-01 PROCEDURE — 25000132 ZZH RX MED GY IP 250 OP 250 PS 637: Mod: GY | Performed by: NURSE ANESTHETIST, CERTIFIED REGISTERED

## 2020-01-01 PROCEDURE — 88331 PATH CONSLTJ SURG 1 BLK 1SPC: CPT | Mod: TC | Performed by: SURGERY

## 2020-01-01 PROCEDURE — 83540 ASSAY OF IRON: CPT | Performed by: INTERNAL MEDICINE

## 2020-01-01 PROCEDURE — 93005 ELECTROCARDIOGRAM TRACING: CPT

## 2020-01-01 PROCEDURE — 97162 PT EVAL MOD COMPLEX 30 MIN: CPT | Mod: GP | Performed by: PHYSICAL THERAPIST

## 2020-01-01 PROCEDURE — 25000128 H RX IP 250 OP 636: Performed by: NURSE ANESTHETIST, CERTIFIED REGISTERED

## 2020-01-01 RX ORDER — ALBUTEROL SULFATE 90 UG/1
2 AEROSOL, METERED RESPIRATORY (INHALATION) 4 TIMES DAILY PRN
Status: DISCONTINUED | OUTPATIENT
Start: 2020-01-01 | End: 2020-01-01 | Stop reason: HOSPADM

## 2020-01-01 RX ORDER — LIDOCAINE 40 MG/G
CREAM TOPICAL
Status: DISCONTINUED | OUTPATIENT
Start: 2020-01-01 | End: 2020-01-01 | Stop reason: HOSPADM

## 2020-01-01 RX ORDER — AMOXICILLIN 250 MG
2 CAPSULE ORAL 2 TIMES DAILY
Status: DISCONTINUED | OUTPATIENT
Start: 2020-01-01 | End: 2020-01-01

## 2020-01-01 RX ORDER — SODIUM CHLORIDE 9 MG/ML
INJECTION, SOLUTION INTRAVENOUS CONTINUOUS PRN
Status: DISCONTINUED | OUTPATIENT
Start: 2020-01-01 | End: 2020-01-01

## 2020-01-01 RX ORDER — BISACODYL 10 MG
10 SUPPOSITORY, RECTAL RECTAL ONCE
Status: COMPLETED | OUTPATIENT
Start: 2020-01-01 | End: 2020-01-01

## 2020-01-01 RX ORDER — LACTOBACILLUS RHAMNOSUS GG 10B CELL
1 CAPSULE ORAL 2 TIMES DAILY
Status: DISCONTINUED | OUTPATIENT
Start: 2020-01-01 | End: 2020-01-01

## 2020-01-01 RX ORDER — ALBUTEROL SULFATE 90 UG/1
2 AEROSOL, METERED RESPIRATORY (INHALATION) 4 TIMES DAILY
Status: DISCONTINUED | OUTPATIENT
Start: 2020-01-01 | End: 2020-01-01 | Stop reason: HOSPADM

## 2020-01-01 RX ORDER — ONDANSETRON 4 MG/1
4 TABLET, ORALLY DISINTEGRATING ORAL EVERY 6 HOURS PRN
Status: DISCONTINUED | OUTPATIENT
Start: 2020-01-01 | End: 2020-01-01 | Stop reason: HOSPADM

## 2020-01-01 RX ORDER — POTASSIUM CHLORIDE 7.45 MG/ML
10 INJECTION INTRAVENOUS
Status: DISCONTINUED | OUTPATIENT
Start: 2020-01-01 | End: 2020-01-01

## 2020-01-01 RX ORDER — POTASSIUM CHLORIDE 1500 MG/1
20-40 TABLET, EXTENDED RELEASE ORAL
Status: DISCONTINUED | OUTPATIENT
Start: 2020-01-01 | End: 2020-01-01

## 2020-01-01 RX ORDER — HYDROCODONE BITARTRATE AND ACETAMINOPHEN 7.5; 325 MG/1; MG/1
1 TABLET ORAL EVERY 4 HOURS PRN
Status: DISCONTINUED | OUTPATIENT
Start: 2020-01-01 | End: 2020-01-01 | Stop reason: HOSPADM

## 2020-01-01 RX ORDER — VECURONIUM BROMIDE 1 MG/ML
5 INJECTION, POWDER, LYOPHILIZED, FOR SOLUTION INTRAVENOUS ONCE
Status: COMPLETED | OUTPATIENT
Start: 2020-01-01 | End: 2020-01-01

## 2020-01-01 RX ORDER — METOPROLOL TARTRATE 1 MG/ML
5 INJECTION, SOLUTION INTRAVENOUS EVERY 6 HOURS
Status: DISCONTINUED | OUTPATIENT
Start: 2020-01-01 | End: 2020-01-01

## 2020-01-01 RX ORDER — ALBUTEROL SULFATE 5 MG/ML
2.5 SOLUTION RESPIRATORY (INHALATION)
Status: DISCONTINUED | OUTPATIENT
Start: 2020-01-01 | End: 2020-01-01

## 2020-01-01 RX ORDER — PROPOFOL 10 MG/ML
5-75 INJECTION, EMULSION INTRAVENOUS CONTINUOUS
Status: DISCONTINUED | OUTPATIENT
Start: 2020-01-01 | End: 2020-01-01 | Stop reason: HOSPADM

## 2020-01-01 RX ORDER — AMOXICILLIN 250 MG
1 CAPSULE ORAL 2 TIMES DAILY
Status: DISCONTINUED | OUTPATIENT
Start: 2020-01-01 | End: 2020-01-01

## 2020-01-01 RX ORDER — POTASSIUM CL/LIDO/0.9 % NACL 10MEQ/0.1L
10 INTRAVENOUS SOLUTION, PIGGYBACK (ML) INTRAVENOUS
Status: DISCONTINUED | OUTPATIENT
Start: 2020-01-01 | End: 2020-01-01 | Stop reason: HOSPADM

## 2020-01-01 RX ORDER — OMEPRAZOLE 40 MG/1
40 CAPSULE, DELAYED RELEASE ORAL DAILY
Status: ON HOLD | COMMUNITY
Start: 2019-01-01 | End: 2020-01-01

## 2020-01-01 RX ORDER — IOPAMIDOL 612 MG/ML
100 INJECTION, SOLUTION INTRAVASCULAR ONCE
Status: COMPLETED | OUTPATIENT
Start: 2020-01-01 | End: 2020-01-01

## 2020-01-01 RX ORDER — FUROSEMIDE 10 MG/ML
INJECTION INTRAMUSCULAR; INTRAVENOUS PRN
Status: DISCONTINUED | OUTPATIENT
Start: 2020-01-01 | End: 2020-01-01

## 2020-01-01 RX ORDER — CEFTRIAXONE SODIUM 1 G/50ML
1 INJECTION, SOLUTION INTRAVENOUS ONCE
Status: DISCONTINUED | OUTPATIENT
Start: 2020-01-01 | End: 2020-01-01

## 2020-01-01 RX ORDER — POTASSIUM CHLORIDE 1.5 G/1.58G
20-40 POWDER, FOR SOLUTION ORAL
Status: DISCONTINUED | OUTPATIENT
Start: 2020-01-01 | End: 2020-01-01

## 2020-01-01 RX ORDER — PROPOFOL 10 MG/ML
INJECTION, EMULSION INTRAVENOUS PRN
Status: DISCONTINUED | OUTPATIENT
Start: 2020-01-01 | End: 2020-01-01

## 2020-01-01 RX ORDER — IPRATROPIUM BROMIDE AND ALBUTEROL SULFATE 2.5; .5 MG/3ML; MG/3ML
3 SOLUTION RESPIRATORY (INHALATION)
Status: DISCONTINUED | OUTPATIENT
Start: 2020-01-01 | End: 2020-01-01

## 2020-01-01 RX ORDER — PHENYLEPHRINE HYDROCHLORIDE 10 MG/ML
INJECTION INTRAVENOUS
Status: DISPENSED
Start: 2020-01-01 | End: 2020-01-01

## 2020-01-01 RX ORDER — ACETAMINOPHEN 650 MG/1
650 SUPPOSITORY RECTAL EVERY 4 HOURS PRN
Status: DISCONTINUED | OUTPATIENT
Start: 2020-01-01 | End: 2020-01-01 | Stop reason: HOSPADM

## 2020-01-01 RX ORDER — BISACODYL 10 MG
10 SUPPOSITORY, RECTAL RECTAL DAILY PRN
Status: DISCONTINUED | OUTPATIENT
Start: 2020-01-01 | End: 2020-01-01 | Stop reason: HOSPADM

## 2020-01-01 RX ORDER — HYDROMORPHONE HYDROCHLORIDE 1 MG/ML
0.5 INJECTION, SOLUTION INTRAMUSCULAR; INTRAVENOUS; SUBCUTANEOUS
Status: DISCONTINUED | OUTPATIENT
Start: 2020-01-01 | End: 2020-01-01 | Stop reason: HOSPADM

## 2020-01-01 RX ORDER — POTASSIUM CHLORIDE 1500 MG/1
40 TABLET, EXTENDED RELEASE ORAL ONCE
Status: DISCONTINUED | OUTPATIENT
Start: 2020-01-01 | End: 2020-01-01

## 2020-01-01 RX ORDER — ACETAMINOPHEN 325 MG/1
650 TABLET ORAL EVERY 4 HOURS PRN
Status: DISCONTINUED | OUTPATIENT
Start: 2020-01-01 | End: 2020-01-01

## 2020-01-01 RX ORDER — POTASSIUM CL/LIDO/0.9 % NACL 10MEQ/0.1L
10 INTRAVENOUS SOLUTION, PIGGYBACK (ML) INTRAVENOUS
Status: DISCONTINUED | OUTPATIENT
Start: 2020-01-01 | End: 2020-01-01

## 2020-01-01 RX ORDER — POTASSIUM CL/LIDO/0.9 % NACL 10MEQ/0.1L
INTRAVENOUS SOLUTION, PIGGYBACK (ML) INTRAVENOUS
Status: DISCONTINUED
Start: 2020-01-01 | End: 2020-01-01 | Stop reason: HOSPADM

## 2020-01-01 RX ORDER — BUDESONIDE 0.5 MG/2ML
0.5 INHALANT ORAL 2 TIMES DAILY
Status: DISCONTINUED | OUTPATIENT
Start: 2020-01-01 | End: 2020-01-01 | Stop reason: HOSPADM

## 2020-01-01 RX ORDER — ALBUTEROL SULFATE 90 UG/1
AEROSOL, METERED RESPIRATORY (INHALATION) PRN
Status: DISCONTINUED | OUTPATIENT
Start: 2020-01-01 | End: 2020-01-01

## 2020-01-01 RX ORDER — LIDOCAINE HYDROCHLORIDE 20 MG/ML
INJECTION, SOLUTION INFILTRATION; PERINEURAL PRN
Status: DISCONTINUED | OUTPATIENT
Start: 2020-01-01 | End: 2020-01-01

## 2020-01-01 RX ORDER — SODIUM CHLORIDE 9 MG/ML
INJECTION, SOLUTION INTRAVENOUS CONTINUOUS
Status: DISCONTINUED | OUTPATIENT
Start: 2020-01-01 | End: 2020-01-01 | Stop reason: HOSPADM

## 2020-01-01 RX ORDER — POTASSIUM CHLORIDE 7.45 MG/ML
10 INJECTION INTRAVENOUS ONCE
Status: DISCONTINUED | OUTPATIENT
Start: 2020-01-01 | End: 2020-01-01

## 2020-01-01 RX ORDER — MAGNESIUM SULFATE HEPTAHYDRATE 40 MG/ML
4 INJECTION, SOLUTION INTRAVENOUS EVERY 4 HOURS PRN
Status: DISCONTINUED | OUTPATIENT
Start: 2020-01-01 | End: 2020-01-01 | Stop reason: HOSPADM

## 2020-01-01 RX ORDER — NALOXONE HYDROCHLORIDE 0.4 MG/ML
.1-.4 INJECTION, SOLUTION INTRAMUSCULAR; INTRAVENOUS; SUBCUTANEOUS
Status: DISCONTINUED | OUTPATIENT
Start: 2020-01-01 | End: 2020-01-01 | Stop reason: HOSPADM

## 2020-01-01 RX ORDER — ONDANSETRON 2 MG/ML
4 INJECTION INTRAMUSCULAR; INTRAVENOUS EVERY 6 HOURS PRN
Status: DISCONTINUED | OUTPATIENT
Start: 2020-01-01 | End: 2020-01-01 | Stop reason: HOSPADM

## 2020-01-01 RX ORDER — NICOTINE 21 MG/24HR
1 PATCH, TRANSDERMAL 24 HOURS TRANSDERMAL DAILY PRN
Status: DISCONTINUED | OUTPATIENT
Start: 2020-01-01 | End: 2020-01-01 | Stop reason: HOSPADM

## 2020-01-01 RX ADMIN — SODIUM CHLORIDE: 9 INJECTION, SOLUTION INTRAVENOUS at 21:28

## 2020-01-01 RX ADMIN — Medication 1 CAPSULE: at 08:51

## 2020-01-01 RX ADMIN — PROPOFOL 20 MG: 10 INJECTION, EMULSION INTRAVENOUS at 13:25

## 2020-01-01 RX ADMIN — BUDESONIDE 0.5 MG: 0.5 INHALANT RESPIRATORY (INHALATION) at 07:22

## 2020-01-01 RX ADMIN — TAZOBACTAM SODIUM AND PIPERACILLIN SODIUM 4.5 G: 500; 4 INJECTION, SOLUTION INTRAVENOUS at 21:40

## 2020-01-01 RX ADMIN — IPRATROPIUM BROMIDE AND ALBUTEROL SULFATE 3 ML: .5; 3 SOLUTION RESPIRATORY (INHALATION) at 07:22

## 2020-01-01 RX ADMIN — BISACODYL 10 MG: 10 SUPPOSITORY RECTAL at 14:38

## 2020-01-01 RX ADMIN — PROPOFOL 20 MG: 10 INJECTION, EMULSION INTRAVENOUS at 13:27

## 2020-01-01 RX ADMIN — HYDROMORPHONE HYDROCHLORIDE 0.5 MG: 1 INJECTION, SOLUTION INTRAMUSCULAR; INTRAVENOUS; SUBCUTANEOUS at 11:38

## 2020-01-01 RX ADMIN — SODIUM CHLORIDE 1000 ML: 9 INJECTION, SOLUTION INTRAVENOUS at 18:38

## 2020-01-01 RX ADMIN — PROPOFOL 20 MG: 10 INJECTION, EMULSION INTRAVENOUS at 13:42

## 2020-01-01 RX ADMIN — PROPOFOL 20 MG: 10 INJECTION, EMULSION INTRAVENOUS at 13:32

## 2020-01-01 RX ADMIN — MIDAZOLAM 4 MG: 1 INJECTION INTRAMUSCULAR; INTRAVENOUS at 15:50

## 2020-01-01 RX ADMIN — ALBUTEROL SULFATE 2 PUFF: 90 AEROSOL, METERED RESPIRATORY (INHALATION) at 20:17

## 2020-01-01 RX ADMIN — PROPOFOL 20 MG: 10 INJECTION, EMULSION INTRAVENOUS at 13:22

## 2020-01-01 RX ADMIN — MIDAZOLAM 4 MG: 1 INJECTION INTRAMUSCULAR; INTRAVENOUS at 17:01

## 2020-01-01 RX ADMIN — BUDESONIDE 0.5 MG: 0.5 INHALANT RESPIRATORY (INHALATION) at 19:43

## 2020-01-01 RX ADMIN — SODIUM CHLORIDE: 9 INJECTION, SOLUTION INTRAVENOUS at 04:39

## 2020-01-01 RX ADMIN — SODIUM CHLORIDE 500 ML: 9 INJECTION, SOLUTION INTRAVENOUS at 18:16

## 2020-01-01 RX ADMIN — METOPROLOL TARTRATE 5 MG: 1 INJECTION, SOLUTION INTRAVENOUS at 03:22

## 2020-01-01 RX ADMIN — IPRATROPIUM BROMIDE AND ALBUTEROL SULFATE 3 ML: .5; 3 SOLUTION RESPIRATORY (INHALATION) at 16:08

## 2020-01-01 RX ADMIN — PROPOFOL 75 MG: 10 INJECTION, EMULSION INTRAVENOUS at 14:35

## 2020-01-01 RX ADMIN — SODIUM CHLORIDE 2160 ML: 9 INJECTION, SOLUTION INTRAVENOUS at 02:22

## 2020-01-01 RX ADMIN — METOPROLOL TARTRATE 5 MG: 1 INJECTION, SOLUTION INTRAVENOUS at 21:36

## 2020-01-01 RX ADMIN — IPRATROPIUM BROMIDE AND ALBUTEROL SULFATE 3 ML: .5; 3 SOLUTION RESPIRATORY (INHALATION) at 11:47

## 2020-01-01 RX ADMIN — HYDROMORPHONE HYDROCHLORIDE 0.5 MG: 1 INJECTION, SOLUTION INTRAMUSCULAR; INTRAVENOUS; SUBCUTANEOUS at 21:27

## 2020-01-01 RX ADMIN — METOPROLOL TARTRATE 5 MG: 1 INJECTION, SOLUTION INTRAVENOUS at 09:12

## 2020-01-01 RX ADMIN — ALBUTEROL SULFATE 8 PUFF: 90 AEROSOL, METERED RESPIRATORY (INHALATION) at 14:04

## 2020-01-01 RX ADMIN — HYDROMORPHONE HYDROCHLORIDE 0.5 MG: 1 INJECTION, SOLUTION INTRAMUSCULAR; INTRAVENOUS; SUBCUTANEOUS at 01:30

## 2020-01-01 RX ADMIN — TAZOBACTAM SODIUM AND PIPERACILLIN SODIUM 4.5 G: 500; 4 INJECTION, SOLUTION INTRAVENOUS at 14:38

## 2020-01-01 RX ADMIN — METOPROLOL 12.5 MG: 25 TABLET ORAL at 08:50

## 2020-01-01 RX ADMIN — IPRATROPIUM BROMIDE AND ALBUTEROL SULFATE 3 ML: .5; 3 SOLUTION RESPIRATORY (INHALATION) at 19:42

## 2020-01-01 RX ADMIN — ALBUTEROL SULFATE 2.5 MG: 2.5 SOLUTION RESPIRATORY (INHALATION) at 04:40

## 2020-01-01 RX ADMIN — SODIUM CHLORIDE: 9 INJECTION, SOLUTION INTRAVENOUS at 13:10

## 2020-01-01 RX ADMIN — ALBUTEROL SULFATE 2 PUFF: 90 AEROSOL, METERED RESPIRATORY (INHALATION) at 16:44

## 2020-01-01 RX ADMIN — PROPOFOL 20 MG: 10 INJECTION, EMULSION INTRAVENOUS at 13:39

## 2020-01-01 RX ADMIN — Medication 10 MEQ: at 02:26

## 2020-01-01 RX ADMIN — PROPOFOL 20 MG: 10 INJECTION, EMULSION INTRAVENOUS at 13:34

## 2020-01-01 RX ADMIN — NICOTINE 1 PATCH: 21 PATCH, EXTENDED RELEASE TRANSDERMAL at 04:23

## 2020-01-01 RX ADMIN — PROPOFOL 20 MG: 10 INJECTION, EMULSION INTRAVENOUS at 13:20

## 2020-01-01 RX ADMIN — NICOTINE 1 PATCH: 21 PATCH, EXTENDED RELEASE TRANSDERMAL at 04:22

## 2020-01-01 RX ADMIN — MIDAZOLAM HYDROCHLORIDE 1 MG/HR: 5 INJECTION, SOLUTION INTRAMUSCULAR; INTRAVENOUS at 16:34

## 2020-01-01 RX ADMIN — HYDROCODONE BITARTRATE AND ACETAMINOPHEN 1 TABLET: 7.5; 325 TABLET ORAL at 08:16

## 2020-01-01 RX ADMIN — PROPOFOL 20 MG: 10 INJECTION, EMULSION INTRAVENOUS at 13:37

## 2020-01-01 RX ADMIN — SODIUM CHLORIDE: 9 INJECTION, SOLUTION INTRAVENOUS at 12:02

## 2020-01-01 RX ADMIN — PROPOFOL 20 MG: 10 INJECTION, EMULSION INTRAVENOUS at 13:24

## 2020-01-01 RX ADMIN — POTASSIUM CHLORIDE 40 MEQ: 1500 TABLET, EXTENDED RELEASE ORAL at 04:26

## 2020-01-01 RX ADMIN — POTASSIUM CHLORIDE 40 MEQ: 1500 TABLET, EXTENDED RELEASE ORAL at 06:34

## 2020-01-01 RX ADMIN — OMEPRAZOLE 40 MG: 20 CAPSULE, DELAYED RELEASE ORAL at 06:49

## 2020-01-01 RX ADMIN — TAZOBACTAM SODIUM AND PIPERACILLIN SODIUM 4.5 G: 500; 4 INJECTION, SOLUTION INTRAVENOUS at 08:13

## 2020-01-01 RX ADMIN — LIDOCAINE HYDROCHLORIDE 40 MG: 20 INJECTION, SOLUTION INFILTRATION; PERINEURAL at 13:21

## 2020-01-01 RX ADMIN — PROPOFOL 20 MG: 10 INJECTION, EMULSION INTRAVENOUS at 13:29

## 2020-01-01 RX ADMIN — HYDROCODONE BITARTRATE AND ACETAMINOPHEN 1 TABLET: 7.5; 325 TABLET ORAL at 04:23

## 2020-01-01 RX ADMIN — METOPROLOL TARTRATE 5 MG: 1 INJECTION, SOLUTION INTRAVENOUS at 17:54

## 2020-01-01 RX ADMIN — PROPOFOL 75 MCG/KG/MIN: 10 INJECTION, EMULSION INTRAVENOUS at 14:30

## 2020-01-01 RX ADMIN — TAZOBACTAM SODIUM AND PIPERACILLIN SODIUM 3.38 G: 375; 3 INJECTION, SOLUTION INTRAVENOUS at 02:40

## 2020-01-01 RX ADMIN — HYDROCODONE BITARTRATE AND ACETAMINOPHEN 1 TABLET: 7.5; 325 TABLET ORAL at 16:07

## 2020-01-01 RX ADMIN — PROPOFOL 20 MG: 10 INJECTION, EMULSION INTRAVENOUS at 13:36

## 2020-01-01 RX ADMIN — IPRATROPIUM BROMIDE AND ALBUTEROL SULFATE 3 ML: .5; 3 SOLUTION RESPIRATORY (INHALATION) at 11:16

## 2020-01-01 RX ADMIN — TAZOBACTAM SODIUM AND PIPERACILLIN SODIUM 3.38 G: 375; 3 INJECTION, SOLUTION INTRAVENOUS at 08:30

## 2020-01-01 RX ADMIN — PROPOFOL 20 MG: 10 INJECTION, EMULSION INTRAVENOUS at 13:43

## 2020-01-01 RX ADMIN — BUDESONIDE 0.5 MG: 0.5 INHALANT RESPIRATORY (INHALATION) at 08:17

## 2020-01-01 RX ADMIN — POTASSIUM CHLORIDE 20 MEQ: 1500 TABLET, EXTENDED RELEASE ORAL at 10:01

## 2020-01-01 RX ADMIN — MIDAZOLAM 4 MG: 1 INJECTION INTRAMUSCULAR; INTRAVENOUS at 16:28

## 2020-01-01 RX ADMIN — VECURONIUM BROMIDE 5 MG: 1 INJECTION, POWDER, LYOPHILIZED, FOR SOLUTION INTRAVENOUS at 18:15

## 2020-01-01 RX ADMIN — PANTOPRAZOLE SODIUM 40 MG: 40 INJECTION, POWDER, FOR SOLUTION INTRAVENOUS at 08:08

## 2020-01-01 RX ADMIN — HYDROCODONE BITARTRATE AND ACETAMINOPHEN 1 TABLET: 7.5; 325 TABLET ORAL at 08:54

## 2020-01-01 RX ADMIN — PROPOFOL 40 MCG/KG/MIN: 10 INJECTION, EMULSION INTRAVENOUS at 17:44

## 2020-01-01 RX ADMIN — SODIUM CHLORIDE: 9 INJECTION, SOLUTION INTRAVENOUS at 04:07

## 2020-01-01 RX ADMIN — POTASSIUM CHLORIDE 40 MEQ: 1500 TABLET, EXTENDED RELEASE ORAL at 08:16

## 2020-01-01 RX ADMIN — IPRATROPIUM BROMIDE AND ALBUTEROL SULFATE 3 ML: .5; 3 SOLUTION RESPIRATORY (INHALATION) at 08:17

## 2020-01-01 RX ADMIN — TAZOBACTAM SODIUM AND PIPERACILLIN SODIUM 4.5 G: 500; 4 INJECTION, SOLUTION INTRAVENOUS at 18:21

## 2020-01-01 RX ADMIN — TAZOBACTAM SODIUM AND PIPERACILLIN SODIUM 4.5 G: 500; 4 INJECTION, SOLUTION INTRAVENOUS at 01:52

## 2020-01-01 RX ADMIN — HYDROCODONE BITARTRATE AND ACETAMINOPHEN 1 TABLET: 7.5; 325 TABLET ORAL at 03:20

## 2020-01-01 RX ADMIN — SENNOSIDES AND DOCUSATE SODIUM 1 TABLET: 8.6; 5 TABLET ORAL at 08:50

## 2020-01-01 RX ADMIN — PROPOFOL 60 MCG/KG/MIN: 10 INJECTION, EMULSION INTRAVENOUS at 15:14

## 2020-01-01 RX ADMIN — FUROSEMIDE 20 MG: 10 INJECTION, SOLUTION INTRAVENOUS at 14:12

## 2020-01-01 RX ADMIN — IOPAMIDOL 100 ML: 612 INJECTION, SOLUTION INTRAVENOUS at 16:58

## 2020-01-01 ASSESSMENT — ENCOUNTER SYMPTOMS
WHEEZING: 0
EYE REDNESS: 0
CONFUSION: 0
ABDOMINAL PAIN: 0
NECK STIFFNESS: 0
FEVER: 0
DIFFICULTY URINATING: 0
ARTHRALGIAS: 0
HEADACHES: 0
COLOR CHANGE: 0

## 2020-01-01 ASSESSMENT — ACTIVITIES OF DAILY LIVING (ADL)
ADLS_ACUITY_SCORE: 22
ADLS_ACUITY_SCORE: 26
ADLS_ACUITY_SCORE: 22
ADLS_ACUITY_SCORE: 25
ADLS_ACUITY_SCORE: 24
ADLS_ACUITY_SCORE: 22
ADLS_ACUITY_SCORE: 22
ADLS_ACUITY_SCORE: 21
ADLS_ACUITY_SCORE: 23
ADLS_ACUITY_SCORE: 23
ADLS_ACUITY_SCORE: 25
ADLS_ACUITY_SCORE: 21

## 2020-01-01 ASSESSMENT — LIFESTYLE VARIABLES: TOBACCO_USE: 1

## 2020-01-01 ASSESSMENT — MIFFLIN-ST. JEOR: SCORE: 1450.25

## 2020-01-02 NOTE — ED TRIAGE NOTES
"Pt presents with c/o \"getting goofy with his walker\" and fell on his left side. Pt denies hitting head, states he has neck pain but wears a c-collar d/t neck surgery.  "

## 2020-01-07 PROBLEM — F11.90 CHRONIC, CONTINUOUS USE OF OPIOIDS: Chronic | Status: ACTIVE | Noted: 2020-01-01

## 2020-01-16 NOTE — TELEPHONE ENCOUNTER
Reason for call: Patient wanting a work in appointment.    Patient is having the following symptoms:  Medication, not getting better after spinal surgery for 1 day    The patient is requesting an appointment with  djs    Was an appointment offered for this call? No    If Yes, what is the date of the appointment?  n/a     Preferred method for responding to this message: Telephone Call    Phone number patient can be reached at? Home number on file [phone] (home)    If we can't reach you directly, may we leave a detailed response at the number you provided? Yes    Can this message wait until your PCP/provider returns if unavailable today? Yes

## 2020-01-16 NOTE — TELEPHONE ENCOUNTER
Please see what is happening.   Not sure if he has pain meds, or if the meds are not working, or is he having problems after surgery...?    Robert Schwarz MD

## 2020-01-17 NOTE — TELEPHONE ENCOUNTER
Regular omeprazole sent to [facility] pharmacy.    Robert Schwarz MD

## 2020-01-17 NOTE — TELEPHONE ENCOUNTER
Patient states that stomach was hurting yesterday. Patient states that omeprazole ER is not working for him. Patient states that he does better on regular medication. Patient uses [facility] pharmacy. Ketty Lopez LPN .............1/17/2020  8:29 AM

## 2020-01-21 PROBLEM — J18.9 CAP (COMMUNITY ACQUIRED PNEUMONIA): Status: ACTIVE | Noted: 2020-01-01

## 2020-01-21 NOTE — PROGRESS NOTES
Inpatient Physical Therapy Evaluation    Name: Ramo Berry MRN# [id number]   Age: 59 year old YOB: 1960     Date of Consultation: 2020  Consultation is requested by:  Dr. Richards  Specific Consultation Request:  Assess needs  Primary care provider: JUSTUS Santillan    General Information:   Onset Date:     History of Current Problem/Admission: Hypokalemia [E87.6]  Generalized muscle weakness [M62.81]    Prior Level of Function: Wife reports that pt's functional status has been steadily declining since last summer.  From August to November they were using a computer chair to get pt around their home.  In November, pt had a surgery and was discharged from [facility] with a manual w/c.  Wife states that from November to present the have been using w/c for mobility still around home but pt does have to ambulate with FWW into bathroom which is only a few steps.  Wife has to assist patient with mobility due to safety and all toileting.  Pt has had several falls but they are not sure how many  Ambulation: 3 - Assistive Equipment & Person  Transferring: 3 - Assistive Equipment & Person  Toileting: 3 - Assistive Equipment & Person   Bathing: 3 - Assistive Equipment & Person  Dressin - Assistive Person   Eatin - Independent   Communication: 0 - Understands/communicates without difficulty  Swallowing:   Cognition: 0 - no cognitive issues reported    Fall history within the last 6 months: Yes    Current Living Situation: Patient has 2 steps to enter home with bilateral rails.  Main floor living inside of home.  Pt lives with his wife.    Current Equipment Used at Home: w/c, FWW, neck brace that is to be on whenever OOB     Patient & Family Goals: open to short term rehab     Past Medical History:   Past Medical History:   Diagnosis Date     Anxiety state, unspecified 2012     Back Pain 2012     Cervical disc disease 2012     Chronic pain syndrome 2012      Community acquired bacterial pneumonia 3/27/2018     Fracture of thoracic spine (H) 8/14/2013     Loculated pleural effusion 03/17/2018    right       Past Surgical History:  Past Surgical History:   Procedure Laterality Date     ARTHROSCOPY SHOULDER, OPEN BICEP TENODESIS REPAIR, COMBINED Right 4/21/2017    Procedure: COMBINED ARTHROSCOPY SHOULDER, OPEN BICEP TENODESIS REPAIR;;  Surgeon: Talha Farrell DO;  Location: [facility] OR     ARTHROSCOPY SHOULDER, OPEN ROTATOR CUFF REPAIR, COMBINED Right 4/21/2017    Procedure: COMBINED ARTHROSCOPY SHOULDER, OPEN ROTATOR CUFF REPAIR;  RIGHT SHOULDER ARTHROSCOPY WITH Open  REPAIR OF ROTATOR CUFFand BICEP TENDODESIS;  Surgeon: Talha Farrell DO;  Location: [facility] OR     C6 C7 cervical fusion       cataract extraction and lens implantation      Bilateral      cystoscopy with biopsies      hematuria     fusion L5 S1       L4 L5 spinal fusion       LUNG SURGERY Right 03/17/2018    Surgery at [facility] to remove infection. Started with pneumonia got worse.     MOUTH SURGERY       ORTHOPEDIC SURGERY Right 08/09/2016    rotator cuff surgery      THORACOTOMY  03/17/2018    right video assisted thoracoscopic surgery       Medications:   Current Facility-Administered Medications   Medication     acetaminophen (TYLENOL) tablet 650 mg     albuterol (PROVENTIL) neb solution 2.5 mg     budesonide (PULMICORT) neb solution 0.5 mg     HYDROcodone-acetaminophen (NORCO) 7.5-325 MG per tablet 1 tablet     ipratropium - albuterol 0.5 mg/2.5 mg/3 mL (DUONEB) neb solution 3 mL     lactobacillus rhamnosus (GG) (CULTURELL) capsule 1 capsule     lidocaine (LMX4) kit     lidocaine 1 % 0.1-1 mL     magnesium sulfate 4 g in 100 mL sterile water (premade)     melatonin tablet 1 mg     metoprolol tartrate (LOPRESSOR) half-tab 12.5 mg     naloxone (NARCAN) injection 0.1-0.4 mg     nicotine (NICODERM CQ) 21 MG/24HR 24 hr patch 1 patch     nicotine Patch in Place     omeprazole (priLOSEC) CR capsule 40 mg      ondansetron (ZOFRAN-ODT) ODT tab 4 mg    Or     ondansetron (ZOFRAN) injection 4 mg     piperacillin-tazobactam (ZOSYN) infusion 3.375 g     potassium chloride (KLOR-CON) Packet 20-40 mEq     potassium chloride 10 mEq in 100 mL intermittent infusion with 10 mg lidocaine     potassium chloride 10 mEq in 100 mL intermittent infusion with 10 mg lidocaine     potassium chloride 10 mEq in 100 mL sterile water intermittent infusion (premix)     potassium chloride ER (K-DUR/KLOR-CON M) CR tablet 20-40 mEq     potassium chloride ER (K-DUR/KLOR-CON M) CR tablet 40 mEq     senna-docusate (SENOKOT-S/PERICOLACE) 8.6-50 MG per tablet 1 tablet    Or     senna-docusate (SENOKOT-S/PERICOLACE) 8.6-50 MG per tablet 2 tablet     sodium chloride (PF) 0.9% PF flush 3 mL     sodium chloride (PF) 0.9% PF flush 3 mL     sodium chloride 0.9% infusion       Weight Bearing Status: FWB bilateral LEs     Cognitive Status Examination:  Orientation: oriented to time, place and person  Level of Consciousness: alert and lethargic  Follows Commands and Answers Questions: 75% of the time  Personal Safety and Judgement: Intact  Memory: Short-term memory intact  Comments:     Pain:   Currently in pain? Yes  Pain Location? neck  Pain Ratin    Physical Therapy Evaluation:   Integumentary/Edema: multiple abrasions noted on bilateral LEs  ROM: Bilateral LE AROM WFL  Strength: Bilateral LE strength grossly 3/5 throughout  Bed Mobility: NT, up in chair  Transfers: sit<>stand mod A up to FWW with neck brace on  Gait: Able to take 4-5 steps forward/backward with FWW mod A  Stairs: NT  Balance: fair- with support from walker  Sensory: reports numbness in LEs  Coordination: NT    Physical Therapy Interventions: Balance, Bed Mobility, Gait Training , Neuro-muscular re-education, Strengthening, Transfer Training, Risk Factor Education and Progressive Activity/Exercise     Clinical Impressions:  Criteria for Skilled Therapeutic Intervention Met: Yes, treatment  indicated  PT Diagnosis: gait disturbance with impaired activity tolerance  Influenced by the following impairments: decreased strength, impaired posture, decreased activity tolerance, decreased balance  Functional limitations due to impairment: decreased safety with all functional mobility including transfers, gait, and stairs.  Clinical presentation: Evolving/Changing  Clinical presentation rationale: clinical judgement  Clinical Decision making (complexity): Moderate Complexity  Frequency: 6 times/week  Predicted Duration of Therapy Intervention (days/wks): 5 days  Anticipated Discharge Disposition: Transitional Care Facility   Anticipated Equipment Needs at Discharge:   Risks and Benefits of therapy have been explained: Yes  Patient, Family & other staff in agreement with plan of care: Yes  Clinical Impression Comments: Pt has significant impairments in strength, balance, and activity tolerance.  Pt has functioning basically from a w/c level for several months at home with wife having to provide physical assist and lifting.  Pending pt's medical prognosis, pt may benefit from short term rehab to improve safety and function.    Total Eval Time: 19

## 2020-01-21 NOTE — PLAN OF CARE
Discharge Planner PT   Patient plan for discharge: wife agreeable to short term rehab  Current status: sit<>stand mod A, able to take 4-5 steps with FWW mod A with fatigue, on 6L O2 with sats 95-96% but difficulty getting accurate reading.  Barriers to return to prior living situation: limited activity tolerance, requires physical assist for safety and transfers, fall risk  Recommendations for discharge: short term rehab pending medical prognosis  Rationale for recommendations: Pt has significant impairments in strength, balance, and activity tolerance.  Pt has functioning basically from a w/c level for several months at home with wife having to provide physical assist and lifting.  Pending pt's medical prognosis, pt may benefit from short term rehab to improve safety and function.       Entered by: Asuncion Vazquez PT 01/21/2020 1:19 PM

## 2020-01-21 NOTE — ED NOTES
DATE:  1/21/2020   TIME OF RECEIPT FROM LAB:  0052  LAB TEST:  Lactic  LAB VALUE:  2.6  RESULTS GIVEN WITH READ-BACK TO (PROVIDER):  Dr. Dunaway  TIME LAB VALUE REPORTED TO PROVIDER:   0055
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
## 2020-01-21 NOTE — PLAN OF CARE
Discharge Planner OT   Patient plan for discharge: open to short term rehab  Current status: Bed Mobility: ModA to move BLE's into bed and multiple verbal cues for scooting up higher   Transfer Skills: ModA sit<>stand from chair and verbal cues for proper hand placement  Bed to Chair/Chair to Bed: ModA and a second person to assist with cords/tubing  Toilet Transfer: ModA   Lower Body Dressing: TotalA to doff and don into a clean pair of briefs   Toileting: TotalA for ambrosio-cares  Barriers to return to prior living situation: decreased activity tolerance, fall risk including multiple falls at home, steps to enter home, weakness, requiring more assistance than wife may be able to manage   Recommendations for discharge: short term rehab (pending medical prognosis)   Rationale for recommendations: Due to pt's current barriers affecting his safety to return home with wife       Entered by: Love Shetty 01/21/2020 3:28 PM

## 2020-01-21 NOTE — PLAN OF CARE
Prior to Admission Medication Reconciliation:     Medications added:   [x] None  [] As listed below:    Medications deleted:   [] None  [x] As listed below:    Cyclobenzaprine 5 mg tid prn filled 11/20. Patient states not taking.    Lorazepam 1 mg 30 tabs/30 ds dispensed 1/08/2020. I asked him if he takes anything for his anxiety but he stated no. Wife stated he needs to be on an anxiolytic and that he also has been on klonopin in the past.     Methocarbamol 750 mg tid filled 11/15    tizanadine 4 mg filled 12/12 90 ds    Changes made to existing medications:   [] None  [x] As listed below:    Updated omeprazole to what the pharmacy dispenses.     Last times/dates taken verified with patient:  [] Yes- completed myself  [x] Nurse completed no changes made  [] Unable to review with patient:  [] Nurse completed/changes made:       Allergy modifications:    []Did not review  []Patient verified NKA  [x]Patient verified current existing allergies: no changes made  []New allergies: listed below      Medication reconciliation sources:   [x]Patient  []Patient family member/emergency contact: **  [x][facility] Report Review:  Home Medications   - Last Reconciled 11/20/19 by Pratima Chang, RAYNE    amlodipine 5 mg tablet 10 mg (2 x 5 mg) PO DAILY@0600     hydrocodone 7.5 mg-acetaminophen 325 mg tablet  1 ea PO Q4H PRN     lorazepam 1 mg tablet 1 mg PO Q6H PRN     meloxicam 7.5 mg tablet 15 mg (2 x 7.5 mg) PO DAILY@0800     methocarbamol 750 mg tablet 750 mg PO TID     metoprolol tartrate 25 mg tablet 2 tabs PO BID     morphine 15 mg tablet,extended release 15 mg PO Q12H     nicotine 14 mg/24 hr daily transdermal patch 14 mg topical DAILY PRN     omeprazole 20 mg capsule,delayed release 2 caps PO DAILY     psyllium husk (aspartame) 3.4 gram oral powder packet (Metamucil Fiber Singles) 1 packet PO BID   []Epic Chart Review  []Care Everywhere review  []Pharmacy med list: **  []Pharmacy phone call  [x]Outside meds dispense  report  []Nursing home MAR:  []Other: **    Is patient on coumadin?  [x]No    Time spent on medication reconciliation:   []5-20 mins  [x]20-40 mins  []> 40 mins    Discrepancies: [x] No  []Yes: listed below    Requests for consultation by provider or pharmacist:   [] Patient understands why all of their meds were prescribed and how to take them. No questions.   [x] Fill dates coincide with compliancy for all maintenance meds.   [] Fill dates do not coincide with compliancy with maintenance meds. See notes in PTA medlist about how patient is taking.   [] Patient has questions about the following:    Comments: Patient stated he has not been taking his lasix. When I asked him about anything he takes for muscle relaxants he stated nothing and the same with for anxiety. His wife wasn't sure when I asked her but thinks he has very bad anxiety and needs to be on something. He does have rx history for them but I did not add them and will leave it to provider discretion.     Liv Gómez on 1/21/2020 at 12:14 PM     Issues completing PTA medication reconciliation:  [] On hold for a long time  [] Waited for a call back  [] Fax didn't come through  [] Fax took a long time  [] Other:

## 2020-01-21 NOTE — PHARMACY
[facility]    Pharmacy      Antimicrobial Stewardship Note     Current antimicrobial therapy:  Anti-infectives (From now, onward)    Start     Dose/Rate Route Frequency Ordered Stop    01/21/20 0900  piperacillin-tazobactam (ZOSYN) infusion 3.375 g      3.375 g  100 mL/hr over 30 Minutes Intravenous EVERY 6 HOURS 01/21/20 0330            Indication: Sepsis     Days of Therapy: 1     Pertinent labs:  Creatinine   Creatinine   Date Value Ref [facility] Status   01/21/2020 0.54 (L) 0.66 - 1.25 mg/dL Final   10/31/2019 0.68 0.66 - 1.25 mg/dL Final   10/30/2019 0.86 0.66 - 1.25 mg/dL Final     WBC   WBC   Date Value Ref Range Status   01/21/2020 8.1 4.0 - 11.0 10e9/L Final   10/31/2019 4.7 4.0 - 11.0 10e9/L Final   10/30/2019 5.3 4.0 - 11.0 10e9/L Final     Procalcitonin No results found for: PCAL  CRP   CRP Inflammation   Date Value Ref Range Status   10/31/2019 39.5 (H) 0.0 - 8.0 mg/L Final   10/30/2019 50.0 (H) 0.0 - 8.0 mg/L Final   01/24/2019 19.0 (H) 0.0 - 8.0 mg/L Final       Culture Results:   (1/21/20) = Blood     Recommendations/Interventions:  1. Zosyn for sepsis should be a dose of 4.5 grams; however primary problem in chart lists CAP. Will clarify indication with provider  2. Add Procalcitonin to labs for monitoring    Sherman Sanders [facility]  January 21, 2020    Procalcitonin added = 5.08; increased dose to 4.5  Sherman Sanders PharmD on  1/21/2020
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
## 2020-01-21 NOTE — PLAN OF CARE
[facility] Inpatient Admission Note:    Patient admitted to 3106/3106-1 at approximately 0330 via cart accompanied by spouse from emergency room . Report received from Tenisha in SBAR format at 0300 via telephone. Patient transferred to bed via slide board.. Patient is alert and oriented X 3, reports pain; rates at 6 on 0-10 scale.  Patient oriented to room, unit, hourly rounding, and plan of care. Explained admission packet and patient handbook with patient bill of rights brochure. Will continue to monitor and document as needed.     Inpatient Nursing criteria listed below was met:    Health care directives status obtained and documented: Yes    Care Everywhere authorization obtained No    MRSA swab completed for patient 65 years and older: N/A    Patient identifies a surrogate decision maker: Yes If yes, who:Lea Contact Information:See facesheet     If initial lactic acid >2.0, repeat lactic acid drawn within one hour of arrival to unit: NA. If no, state reason: Repeat in ER 1.4    Vaccination assessment and education completed: Yes   Vaccinations received prior to admission: Pneumovax no  Influenza(seasonal)  NO   Vaccination(s) ordered: influenza vaccine    Clergy visit ordered if patient requests: N/A    Skin issues/needs documented: N/A    Isolation Patient: N/A Education given, correct sign in place and documentation row added to PCS:  N/A    Fall Prevention Yes: Care plan updated, education given and documented, sticker and magnet in place: Yes    Care Plan initiated: Yes    Education Documented (including assessment): Yes    Patient has discharge needs : Yes If yes, please explain:Wife unable to help him at home. Pt very weak and unable to care for self

## 2020-01-21 NOTE — ED NOTES
DATE:  1/21/2020   TIME OF RECEIPT FROM LAB:  0146  LAB TEST:  Postassium  LAB VALUE:  2.8  RESULTS GIVEN WITH READ-BACK TO (PROVIDER):  Dr. Dunaway  TIME LAB VALUE REPORTED TO PROVIDER:   0146

## 2020-01-21 NOTE — PROGRESS NOTES
Inpatient Occupational Therapy Evaluation    Name: Ramo Berry MRN# [id number]   Age: 59 year old YOB: 1960     Date of Consultation: 2020  Consultation is requested by:  Dr. Richards  Specific Consultation Request:  Assess needs  Primary care provider: JUSTUS Santillan    General Information:   Onset Date: 2020    History of Current Problem/Admission: Hypokalemia [E87.6]  Generalized muscle weakness [M62.81]    Prior Level of Function: Pt had surgery in 2019, and since then has pretty much been using a walker for ambulation but was taking a few steps into the bathroom as it was too small to fit the walker. Pt requires assistance from wife for dressing. She previously was bathing him (going in the shower with pt) however now is just nearby and he completes bathing himself. Pt can go to the bathroom with wife's assistance for transferring.   Ambulation: 3 - Assistive Equipment & Person  Transferring: 3 - Assistive Equipment & Person  Toiletin - Assistive Person    Bathing: 3 - Assistive Equipment & Person  Dressin - Assistive Person   Eatin - Independent   Communication: 0 - Understands/communicates without difficulty  Cognition: 0 - no cognitive issues reported    Fall history within the last 6 months: Yes - several (pt jokingly reported 50 although the exact amount is unknown)     Current Living Situation: Pt lives with his spouse. 2 steps to enter home. Main floor living once inside. Bathroom has a shower/tub combo with a chair and a towel wrack and a regular height toilet (no grab bars near toilet)     Current Equipment Used at Home: Wheelchair, FWW, neck brace (wear OOB), shower chair      Patient & Family Goals: Open to short term rehab     Past Medical History:   Past Medical History:   Diagnosis Date     Anxiety state, unspecified 2012     Back Pain 2012     Cervical disc disease 2012     Chronic pain syndrome 2012     [facility]  acquired bacterial pneumonia 3/27/2018     Fracture of thoracic spine (H) 8/14/2013     Loculated pleural effusion 03/17/2018    right       Past Surgical History:  Past Surgical History:   Procedure Laterality Date     ARTHROSCOPY SHOULDER, OPEN BICEP TENODESIS REPAIR, COMBINED Right 4/21/2017    Procedure: COMBINED ARTHROSCOPY SHOULDER, OPEN BICEP TENODESIS REPAIR;;  Surgeon: Talha Farrell DO;  Location: [facility]     ARTHROSCOPY SHOULDER, OPEN ROTATOR CUFF REPAIR, COMBINED Right 4/21/2017    Procedure: COMBINED ARTHROSCOPY SHOULDER, OPEN ROTATOR CUFF REPAIR;  RIGHT SHOULDER ARTHROSCOPY WITH Open  REPAIR OF ROTATOR CUFFand BICEP TENDODESIS;  Surgeon: Talha Farrell DO;  Location: [facility] OR     C6 C7 cervical fusion       cataract extraction and lens implantation      Bilateral      cystoscopy with biopsies      hematuria     fusion L5 S1       L4 L5 spinal fusion       LUNG SURGERY Right 03/17/2018    Surgery at [facility] to remove infection. Started with pneumonia got worse.     MOUTH SURGERY       ORTHOPEDIC SURGERY Right 08/09/2016    rotator cuff surgery      THORACOTOMY  03/17/2018    right video assisted thoracoscopic surgery       Medications:   Current Facility-Administered Medications   Medication     acetaminophen (TYLENOL) tablet 650 mg     albuterol (PROVENTIL) neb solution 2.5 mg     budesonide (PULMICORT) neb solution 0.5 mg     HYDROcodone-acetaminophen (NORCO) 7.5-325 MG per tablet 1 tablet     ipratropium - albuterol 0.5 mg/2.5 mg/3 mL (DUONEB) neb solution 3 mL     lactobacillus rhamnosus (GG) (CULTURELL) capsule 1 capsule     lidocaine (LMX4) kit     lidocaine 1 % 0.1-1 mL     magnesium sulfate 4 g in 100 mL sterile water (premade)     melatonin tablet 1 mg     metoprolol tartrate (LOPRESSOR) half-tab 12.5 mg     naloxone (NARCAN) injection 0.1-0.4 mg     nicotine (NICODERM CQ) 21 MG/24HR 24 hr patch 1 patch     nicotine Patch in Place     omeprazole (priLOSEC) CR capsule 40 mg     ondansetron  (ZOFRAN-ODT) ODT tab 4 mg    Or     ondansetron (ZOFRAN) injection 4 mg     piperacillin-tazobactam (ZOSYN) intermittent infusion 4.5 g     potassium chloride (KLOR-CON) Packet 20-40 mEq     potassium chloride 10 mEq in 100 mL intermittent infusion with 10 mg lidocaine     potassium chloride 10 mEq in 100 mL intermittent infusion with 10 mg lidocaine     potassium chloride 10 mEq in 100 mL sterile water intermittent infusion (premix)     potassium chloride ER (K-DUR/KLOR-CON M) CR tablet 20-40 mEq     potassium chloride ER (K-DUR/KLOR-CON M) CR tablet 40 mEq     senna-docusate (SENOKOT-S/PERICOLACE) 8.6-50 MG per tablet 1 tablet    Or     senna-docusate (SENOKOT-S/PERICOLACE) 8.6-50 MG per tablet 2 tablet     sodium chloride (PF) 0.9% PF flush 3 mL     sodium chloride (PF) 0.9% PF flush 3 mL     sodium chloride 0.9% infusion       Weight Bearing Status: FWB BLE's     Cognitive Status Examination:  Orientation: oriented to time, place and person  Level of Consciousness: lethargic  Follows Commands and Answers Questions: 75% of the time  Personal Safety and Judgement: Intact  Memory: Immediate recall intact    Pain:   Currently in pain? Yes  Pain Location? neck   Pain Ratin    Occupational Therapy Evaluation:   Integumentary/Edema: not assessed   Range of Motion: BUE WFL but pt has deficits with R hand - cannot fully extend and has occasional difficulties with grasping and bending his wrist   Strength: WFL  Muscle Tone Assessment: no issues observed  Coordination: impaired especially RUE     Mobility:   Bed Mobility: ModA to move BLE's into bed and multiple verbal cues for scooting up higher   Transfer Skills: ModA sit<>stand from chair and verbal cues for proper hand placement  Bed to Chair/Chair to Bed: ModA and a second person to assist with cords/tubing  Toilet Transfer: ModA   Balance: fair with support from walker     ADLs:   Lower Body Dressing: TotalA to doff and don into a clean pair of briefs    Toileting: TotalA for ambrosio-cares    IADLs:   Previous Responsibilities of the Patient: Meal Prep and Medication Management  Comments: Pt makes simple meals like soup, wife completes majority of meal prep. He was previously managing his own meds, but wife indicated some difficulties opening bottles due to coordination/ROM deficits. Wife completes remaining IADLs     Activities of Daily Living Analysis:   Impairments Contributing to Impaired Activities of Daily Living: Balance impaired , fine motor coordination impaired , pain, ROM decreased , strength decreased and activity tolerance decreased    Occupational Therapy Interventions: ADL Retraining , fine motor control coordination , ROM , strengthening , progressive activity/exercise and risk factor education     Clinical Impressions:  Criteria for Skilled Therapeutic Intervention Met: Yes, treatment indicated  OT Diagnosis: impaired ADLs  Influenced by the following impairments: Balance impaired , fine motor coordination impaired , pain, ROM decreased , strength decreased and activity tolerance decreased  Functional limitations due to impairment: Dressing, grooming, bathing, toileting, functional mobility, med management, simple meal prep  Clinical presentation: Evolving/Changing  Clinical presentation rationale: Clinical judgement   Clinical Decision making (complexity): Moderate Complexity  Frequency: 5 times/week  Predicted Duration of Therapy Intervention (days/wks): 5 days  Anticipated Discharge Disposition: Transitional Care Facility   Anticipated Equipment Needs at Discharge: TBD  Risks and Benefits of therapy have been explained: Yes  Patient, Family & other staff in agreement with plan of care: Yes  Clinical Impression Comments: Pt presents with impaired ADLs due to impaired balance, weakness, decreased fine motor coordination, deconditioning, pain, and deficits with R hand/wrist ROM. Pt has had numerous falls at home, with pt/spouse not knowing how many.  Pt may benefit from short term rehab to address his deficits and improve his safety, functional capacity, and quality of life. Plan to treat during his stay.     Total Eval Time: 20

## 2020-01-21 NOTE — ED NOTES
"Patient to ED room 3 via wheelchair with wife accompanying. Per patient's wife, patient has become more weak since spinal surgery in November. Wife also notes \"he can't keep anything down\". Wife unable to state exactly when weakness and nausea started. Patient had a 1 week follow up with surgeon and another next week. Wife states that she has been unable to get patient into primary care provider. Wife having difficulty assisting patient with ADLs due to weakness. Wife notes that patient has \"lost a lot of weight\". Patient assisted into bed by 2 staff. Patient does have a neck brace on, but both patient and wife state that patient can have it off if \"I'm lying down\". Patient assisted to lying down and collar removed.   "

## 2020-01-21 NOTE — PROGRESS NOTES
Assessment completed see flowsheet.    LOC: alert and oriented, participated very minimally in the conversation, deferring it to his wife Lea  Others present: Patient and his wife Lea    Dx: pna  Chronic Disease Management: chronic pain    Lives with: Lea  Living at:  Home in [place]  Transportation: YES Via a friend, Lea lost her driving privileges about a month ago    Primary PCP: JUSTUS Santillan  Insurance:  Medicare; pt financial counselor has been up to fill out the MA jean-pierre's with them and plans to submit it today  Medicare IM letter reviewed with them today, signed by Lea per Ramo's request.    Homecare/PCA: none  /[facility]:   no  : NO      How was the [facility] notification completed: na    Health Care Directive: NO   Guardian: no  POA: no    ADLs: Lea helps him with all; she says it has been 3 weeks since he has been able to do his own self cares  Nutrition: Lea does the shopping and meal prep    Does not use oxygen at home    Able to Return to Prior Living Arrangements: To be determined    Choice of Vendor: if a SNF is necessary she would prefer it be in the [place] or [place] areas    Barriers: to be determined; Lea feels Ramo may refuse to go to a SNF and says he'd gone to [facility] in the past but called her the same day to take him home    IKE: medium    Plan: to be determined    Referrals:  [facility]-faxed  [facility]-faxed  [facility]-faxed

## 2020-01-21 NOTE — PROVIDER NOTIFICATION
Clarified giving ordered calcium gluconate with MUNDO Hall due to concern of lowering potassium level that is being replaced. Hold at this time, till provider can review chart and clarify.

## 2020-01-22 PROBLEM — I10 BENIGN ESSENTIAL HYPERTENSION: Status: ACTIVE | Noted: 2017-04-12

## 2020-01-22 PROBLEM — M54.12 CERVICAL RADICULOPATHY: Status: ACTIVE | Noted: 2019-01-01

## 2020-01-22 PROBLEM — R11.0 CHRONIC NAUSEA: Status: ACTIVE | Noted: 2020-01-01

## 2020-01-22 PROBLEM — J90 LOCULATED PLEURAL EFFUSION: Status: ACTIVE | Noted: 2018-03-17

## 2020-01-22 PROBLEM — R11.2 NON-INTRACTABLE VOMITING WITH NAUSEA: Status: ACTIVE | Noted: 2020-01-01

## 2020-01-22 PROBLEM — D50.0 IRON DEFICIENCY ANEMIA DUE TO CHRONIC BLOOD LOSS: Status: ACTIVE | Noted: 2020-01-01

## 2020-01-22 NOTE — PROGRESS NOTES
[facility]    Hospitalist Progress Note    Date of Service (when I saw the patient): 01/21/2020    Assessment & Plan       CAP (community acquired pneumonia): Large area of consolidation to the right lung. He has a mild cough, denies fevers at home, does endorse dyspnea over the course of the last month. No fevers since arrival, no leukocytosis but he does have mildly elevated procalcitonin at 5.08 today. CT scan done to better differentiate this large areas that was not present on imaging done 10/31. CT scan shows RLL air fluid levels suggesting loculated or multiple abscesses. He has a prior history of R lung pneumonia>parapneumonic effusion>VATS procedure in 2018. Continue high dose Zosyn as cannot rule out pseudomonas as he does have risk factors. Will need to discuss VATS again with pulmonology once we get the gastric outlet obstruction figured out. He is stable and improving on antibiotics.     Nausea/Vomiting: patient states for the last several weeks to a month he has randomly been vomiting. With or without eating. He is a poor historian and neither he nor his wife can give a clearer picture of onset of symptoms and any worsening/relieving factors.  He has prior history of NSAID-induced duodenal ulcer and GI bleeding. He has not vomited since arrival. However, CT scan shows probable gastric outlet obstruction. NPO, NGT placed, consult surgery-Dr. Pearce for EGD in AM.    Profound weakness: Likely combination of deconditioning after lumbar surgery,anemia and malnutrition. Unable to get off the commode prior to arrival even with help of his wife. PT/OT evaluation.     Malnutrition: Albumin low at 1.7. Cachetic, muscle wasting and subcutaneous fat loss noted. He has had a 21 pound weight loss in just under 3 months. Due to above issues.     Electrolyte abnormalities: Hypokalemia, hypomagnesemia due to poor oral intake plus vomiting. He does have low calcium but when corrected for albumin it is in  the normal range. IV replacement Mag and potassium per protocol. No calcium replacement needed.       Anemia: Unknown chronicity but his normal blood pressures and heart rate would suggest subacute. He was slightly anemic 10/30/19, previous to that he did not have anemia. He is a very poor historian. There have been no signs of acute bleeding, he is in fact constipated.    Constipation: Unable to verbalize chronicity. KUB shows moderate stools, significant amount of stool in rectum. Will start with bisacodyl suppository.    Urinary retention: patient denies previous history. Has been unable to urinate since arrival. Straight cath x1 900 ml this morning. Again unable to void x6 hours, mathews placed.       DVT Prophylaxis: Pneumatic Compression Devices  Code Status: Full Code    Disposition: Expected discharge in 2-3 days once evaluation complete.    Nataliia Hall, CNP    Interval History   Denies nausea. Hungry this morning and drank several ensures. Denies any abdominal pain now or previous to arrival. Mild dyspnea with exertion, rare cough.    -Data reviewed today: I reviewed all new labs and imaging results over the last 24 hours.    Physical Exam   Temp: 99.7  F (37.6  C) Temp src: Tympanic BP: 113/72 Pulse: 102 Heart Rate: 102 Resp: 20 SpO2: 94 % O2 Device: Nasal cannula Oxygen Delivery: 4 LPM  Vitals:    01/21/20 0341   Weight: 62.4 kg (137 lb 9.1 oz)     Vital Signs with Ranges  Temp:  [98.1  F (36.7  C)-99.7  F (37.6  C)] 99.7  F (37.6  C)  Pulse:  [] 102  Heart Rate:  [] 102  Resp:  [16-23] 20  BP: ()/(63-78) 113/72  SpO2:  [88 %-98 %] 94 %  I/O last 3 completed shifts:  In: 3873 [P.O.:840; I.V.:3033]  Out: 1550 [Urine:1550]    Peripheral IV 01/21/20 Right Upper forearm (Active)   Site Assessment WDL 1/22/2020  8:00 AM   Line Status Infusing 1/22/2020  8:00 AM   Phlebitis Scale 0-->no symptoms 1/22/2020  8:00 AM   Infiltration Scale 0 1/22/2020  8:00 AM   Number of days: 1        NG/OG/NJ Tube Nasogastric 16 fr Left nostril tolerated well (Active)   Site Description WDL 1/22/2020  8:00 AM   Status Suction-low intermittent 1/22/2020  8:00 AM   Drainage Appearance Other (comment) 1/22/2020  8:00 AM   Placement Confirmation [place] unchanged 1/22/2020  8:00 AM   [place] (cm marking) at nare/mouth 50 cm 1/22/2020  8:00 AM   Container Amount 500 mL 1/21/2020  9:50 PM   Number of days: 1       Urethral Catheter (Active)   Tube Description Positional 1/22/2020  8:00 AM   Catheter Care Catheter wipes 1/22/2020  8:00 AM   Collection Container Standard 1/22/2020  8:00 AM   Securement Method Securing device (Describe) 1/22/2020  8:00 AM   Rationale for Continued Use Retention 1/22/2020  8:00 AM   Urine Output 650 mL 1/22/2020  1:20 AM   Number of days: 1       Wound 10/31/19 Posterior;Right Elbow Skin tear Starting to scab, superficial skin layer pulled back some. 20 mm x 10 mm (Active)   Number of days: 83     Line/device assessment(s) completed for medical necessity    Constitutional: Awake,alert, very ill appearing  Respiratory: Diminished right mid to base with scattered wheezing bilaterally.   Cardiovascular: HRR, no murmurs, rubs,thrills.   GI: Soft,nontender, bowel sounds are positive but hypoactive.   Skin/Integumen: Pale to jaundiced, scattered bruises.       Medications     sodium chloride 150 mL/hr at 01/22/20 0800       budesonide  0.5 mg Nebulization BID     ipratropium - albuterol 0.5 mg/2.5 mg/3 mL  3 mL Nebulization 4x daily     metoprolol  5 mg Intravenous Q6H     nicotine   Transdermal Q8H     pantoprazole (PROTONIX) IV  40 mg Intravenous Daily with breakfast     piperacillin-tazobactam  4.5 g Intravenous Q6H     sodium chloride (PF)  3 mL Intracatheter Q8H       Data   Recent Labs   Lab 01/22/20  0520 01/21/20  1030 01/21/20  0128 01/21/20  0044   WBC 5.4  --   --  8.1   HGB 7.6*  --   --  9.5*   MCV 83  --   --  83     --   --  297     --  133  --    POTASSIUM 3.3*  3.5 2.8*  --    CHLORIDE 102  --  90*  --    CO2 28  --  29  --    BUN 11  --  13  --    CR 0.51*  --  0.54*  --    ANIONGAP 6  --  14  --    LUIS 6.5*  --  7.3*  --    GLC 93  --  63*  --    ALBUMIN 1.2*  --  1.7*  --    PROTTOTAL 5.1*  --  6.2*  --    BILITOTAL 0.2  --  0.4  --    ALKPHOS 98  --  133  --    ALT 21  --  20  --    AST 27  --  24  --    TROPI  --   --  <0.015  --        Recent Results (from the past 24 hour(s))   XR Abdomen Port 1 View    Narrative    XR ABDOMEN PORT 1 VW, 1/21/2020 1:42 PM    History: Male, age 59 years; vomiting    Comparison: None.    Technique: Single view .    FINDINGS: Moderate volume of stool is seen within the colon. Bowel gas  pattern is unremarkable. Extensive postoperative changes are seen  within the lower lumbar spine/lumbosacral region.      Impression    IMPRESSION:   No evidence of obstruction.    Moderate volume of stool.    BASHIR HAMMER MD   CT Chest/Abdomen/Pelvis w Contrast    Narrative    CT CHEST/ABDOMEN/PELVIS W CONTRAST    CLINICAL HISTORY: Male, age 59 years, large cavitary mass right lung  combined with weight loss, abdominal pain and vomiting x1 month. No  oral contrast.;    Comparison:  CT scan abdomen and pelvis 1/24/2019    TECHNIQUE:  CT was performed of the chest, abdomen and pelvis  after  the administration of IV  contrast.   Sagittal, coronal and axial reconstructions were reviewed.     FINDINGS:  Chest CT:   Lungs : The right lower lobe demonstrates near complete consolidation  with numerous air-fluid levels suggesting numerous abscesses and/or  complex abscess formation. There is interstitial thickening,  groundglass opacification nodular consolidation involving the  posterior segment of the right lower lobe. Right upper and right  middle lobes are spared. Left lung appears clear.    Thyroid: The visualized portions are normal.  Heart and Great Vessels:  No evidence of acute abnormality. Thoracic  aorta is intact. No evidence of apparent  embolus.  Lymph Nodes:  Mildly enlarged right hilar nodes and right  paraesophageal nodes.  Pleura: Minimal volume of right-sided pleural fluid.  Bony Structures:  No acute abnormality. There are mild degenerative  changes of the thoracic spine. Respiratory motion limits evaluation of  the sternum.  Esophagus: The esophagus is dilated and fluid-filled.    Abdomen/Pelvis CT:  Stomach and duodenum: Stomach is distended with large volume of  material.  Liver:  Low dense focus is again seen near the falciform ligament  suggesting fatty infiltration. Gas is now seen within the biliary  tree.  Gallbladder: Small volume of gas is seen within the gallbladder lumen.  Spleen: Normal.  Pancreas: Normal.  Adrenal Glands: Normal.  Kidneys: Kidneys are similar in appearance again demonstrating  cortical cysts and scarring in the upper pole the right kidney.  Ureters: Not well seen. Visualized portions are grossly normal. Streak  artifact arising from metallic hardware within the lumbar spine limits  evaluation.  Abdominal Aorta: 4.2 cm fusiform infrarenal aneurysm is larger,  previously measuring 3.7 cm. No evidence of acute leak.  IVC: Normal.  Lymph Nodes: Number of retroperitoneal nodes appear to be increased  slightly in size and number.  Urinary bladder: Normal.  Large and Small Bowel: Large volume of stool seen within the rectum.  Diverticulosis of the sigmoid colon is again seen with circumferential  wall thickening. No distinct evidence of diverticulitis.    Pelvic Organs:  Normal.  Peritoneum: Moderate free fluid in the lower pelvis.  Bony structures: Normal.    Other Findings:  Inguinal lymph nodes are normal.      Impression    IMPRESSION:   Consolidation of the right lower lobe with a number of air-fluid  levels concerning for an intraparenchymal lung abscess, possibly  related to aspiration pneumonia. The esophagus is dilated and  fluid-filled. Stomach is again markedly distended concerning for  gastric outlet  obstruction.    Enlarging lymph nodes in the right hilum and mediastinum as well as in  the retroperitoneum suggesting reactive lymphadenopathy.    Slight interval enlargement of 4.2 cm fusiform infrarenal abdominal  aortic aneurysm without evidence to suggest acute leak.    Small volume of free fluid within the lower pelvis. No evidence of  free peritoneal right peritoneal gas.    Pneumobilia and small volume of gas within the gallbladder lumen  suggesting sphincterotomy.    Other chronic changes throughout the abdomen/pelvis are similar in  appearance including diverticulosis of the sigmoid colon. No distinct  evidence of diverticulitis.    BASHIR HAMMER MD

## 2020-01-22 NOTE — PLAN OF CARE
Patient up to floor with OR staff via bed being ventilated with bag and mask. Transferred to room 3126. ICU staff in attendance. Monitors applied. Patient incontinent of loose brown stool a large amount. Anesthesia attempting art line placement. Skin color very pale and gray. Oxygen sats 30's to 66 .

## 2020-01-22 NOTE — PLAN OF CARE
Pt unavailable for OT this afternoon as he is undergoing endoscopy procedure. OT will attempt again tomorrow.

## 2020-01-22 NOTE — PROGRESS NOTES
Called to OR 5. Pt intubated being ventilated per anesthesia. Assisted transport to ICU. Ventilated with ambu bag 100% and peep 10. Pt placed on  A/C 14/ min,Vt 500 ml, Fio2 1.0 Peep 10. Difficult to obtain Spo2, multiple sites attempted. Peep increased to 12 cmh2O. Suctioned pt for moderate amount of marjan secretions two times.

## 2020-01-22 NOTE — CONSULTS
[facility] Urology Consultation    Ramo Berry MRN# [id number]   Age: 59 year old YOB: 1960     Date of Admission:  1/21/2020    Reason for consult: paraphimosis       Requesting physician: arley Hall       Level of consult: One-time consult to assist in determining a diagnosis and to recommend an appropriate treatment plan                Chief Complaint:   Abnormal penis     Unable to obtain a history from the patient due to mental status and intubation    HPI: Ramo is a 59-year-old male I was asked to evaluate by Arley Hall nurse practitioner for abnormal findings on the penis.  Patient was in surgery and had a Joseph catheter placed and she was concerned about a paraphimosis and she asked me to come and evaluate him.  Patient is unable to provide me with any history as he is in the ICU and intubated          Past Medical History:   I have reviewed this patient's past medical history          Past Surgical History:   I have reviewed this patient's past surgical history          Social History:   I have reviewed this patient's social history          Family History:   I have reviewed this patient's family history          Immunizations:   Immunization status is unknown          Allergies:   All allergies reviewed and addressed          Medications:     Current Facility-Administered Medications   Medication     0.9% sodium chloride BOLUS     acetaminophen (TYLENOL) tablet 650 mg     albuterol (PROAIR HFA/PROVENTIL HFA/VENTOLIN HFA) 108 (90 Base) MCG/ACT inhaler 2 puff     albuterol (PROAIR HFA/PROVENTIL HFA/VENTOLIN HFA) 108 (90 Base) MCG/ACT inhaler 2 puff     bisacodyl (DULCOLAX) Suppository 10 mg     budesonide (PULMICORT) neb solution 0.5 mg     HYDROcodone-acetaminophen (NORCO) 7.5-325 MG per tablet 1 tablet     HYDROmorphone (PF) (DILAUDID) injection 0.5 mg     lidocaine (LMX4) kit     lidocaine 1 % 0.1-1 mL     magnesium sulfate 4 g in 100 mL sterile water (premade)      melatonin tablet 1 mg     metoprolol (LOPRESSOR) injection 5 mg     midazolam (VERSED) 1 mg/mL infusion     naloxone (NARCAN) injection 0.1-0.4 mg     nicotine (NICODERM CQ) 21 MG/24HR 24 hr patch 1 patch     nicotine Patch in Place     ondansetron (ZOFRAN-ODT) ODT tab 4 mg    Or     ondansetron (ZOFRAN) injection 4 mg     pantoprazole (PROTONIX) 40 mg IV push injection     phenylephrine (ALEX-SYNEPHRINE) 10 MG/ML injection     phenylephrine (ALEX-SYNEPHRINE) 10 MG/ML injection     phenylephrine (ALEX-SYNEPHRINE) 10 MG/ML injection     piperacillin-tazobactam (ZOSYN) intermittent infusion 4.5 g     potassium chloride (KLOR-CON) Packet 20-40 mEq     potassium chloride 10 mEq in 100 mL intermittent infusion with 10 mg lidocaine     potassium chloride 10 mEq in 100 mL intermittent infusion with 10 mg lidocaine     potassium chloride 10 mEq in 100 mL sterile water intermittent infusion (premix)     potassium chloride ER (K-DUR/KLOR-CON M) CR tablet 20-40 mEq     propofol (DIPRIVAN) infusion     sodium chloride (PF) 0.9% PF flush 3 mL     sodium chloride (PF) 0.9% PF flush 3 mL     sodium chloride 0.9% infusion             Review of Systems:   Unable to obtain a review of systems due to intubation     Physical exam: Patient is currently intubated in the ICU.  Genital exam reveals Coban wrapped around the penis which I instructed the nurse practitioner to do as I expected swelling.  I removed the Coban and there is edema of the shaft of the penis.  There is a Jospeh catheter exiting from the urethral meatus.  This is consistent with a paraphimosis.  I was able to pull the foreskin back over the head of the penis reducing the paraphimosis.          Data:   All laboratory data reviewed  All imaging studies reviewed by me.     Attestation:  Amount of time performed on this consult: 15 minutes.    Miguelina Hurtado MD

## 2020-01-22 NOTE — ANESTHESIA CARE TRANSFER NOTE
Patient: Ramo Berry    Procedure(s):  Upper endoscopy with frozen biopsy and balloon dilating of duodenum    Diagnosis: Iron deficiency anemia due to chronic blood loss [D50.0]  Chronic nausea [R11.0]  Diagnosis Additional Information: No value filed.    Anesthesia Type:   MAC     Note:  Airway :ETT  Patient transferred to:ICU  ICU Handoff: Call for PAUSE to initiate/utilize ICU HANDOFF, Identified Patient, Identified Responsible Provider, Reviewed the Pertinent Medical History, Discussed Surgical Course, Reviewed Intra-OP Anesthesia Management and Issues during Anesthesia, Set Expectations for Post Procedure Period and Allowed Opportunity for Questions and Acknowledgement of Understanding      Vitals: (Last set prior to Anesthesia Care Transfer)    CRNA VITALS  1/22/2020 1346 - 1/22/2020 1446      1/22/2020             Pulse:  107    SpO2:  (!) 74 %                Electronically Signed By: QUENTIN Grant CRNA  January 22, 2020  3:55 PM
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
## 2020-01-22 NOTE — PLAN OF CARE
"Reason for hospital stay:  Community acquired pneumonia    Living situation PTA: Home with wife    Most recent vitals: /78   Pulse 102   Temp 99.2  F (37.3  C) (Tympanic)   Resp 20   Ht 1.778 m (5' 10\")   Wt 62.4 kg (137 lb 9.1 oz)   SpO2 (!) 88%   BMI 19.74 kg/m      Pain Management:  Pain managed with prn Dilaudid and Norco throughout the shift    LOC:  A&O x3, disoriented to time    Cardiac:  Tachycardia, apical pulse regular, no edema present    Respiratory:  Posterior lungs sound LLL- crackles, RUL- fine crackles, RML- coarse crackles, RLL- diminished, crackles. Dyspnea on exertion, shortness of breath. Sats maintaining while pt awake and on RA. Sats dropped low 80's while pt asleep - O2 placed back on pt at 2 LPM to start. Currently on 5 LPM. Frequent congested, nonproductive cough    GI:  Bowel sounds hyperactive all four quadrants. Incontinent of stool. Denies N/V. Occult stool sample sent to lab- Positive results.    :  Catheter in place with adequate tiffany output throughout the shift    Skin Issues:  Bruises noted throughout body, scab to posterior neck    IVF:   ml/hr     ABX:  Zosyn    Nutrition: Pt NPO with ice chips- changed to clear liquid early morning per Dr. Pearce    ADL's:  Assist of 2    Ambulation: Assist of 2 walker/gait belt    Safety:  Call light within reach    Face to face report given with opportunity to observe patient.    Report given to Fortunato Cordon   1/22/2020  7:16 AM    "

## 2020-01-22 NOTE — PHARMACY
[facility]    Pharmacy      Antimicrobial Stewardship Note     Current antimicrobial therapy:  Anti-infectives (From now, onward)    Start     Dose/Rate Route Frequency Ordered Stop    01/21/20 1430  piperacillin-tazobactam (ZOSYN) intermittent infusion 4.5 g      4.5 g  200 mL/hr over 30 Minutes Intravenous EVERY 6 HOURS 01/21/20 1414            Indication: Sepsis         Days of Therapy: 2        Pertinent labs:  Creatinine   Creatinine   Date Value Ref [facility] Status   01/22/2020 0.51 (L) 0.66 - 1.25 mg/dL Final   01/21/2020 0.54 (L) 0.66 - 1.25 mg/dL Final   10/31/2019 0.68 0.66 - 1.25 mg/dL Final     WBC   WBC   Date Value Ref [facility] Status   01/22/2020 5.4 4.0 - 11.0 10e9/L Final   01/21/2020 8.1 4.0 - 11.0 10e9/L Final   10/31/2019 4.7 4.0 - 11.0 10e9/L Final     Procalcitonin   Procalcitonin   Date Value Ref [facility] Status   01/22/2020 3.12 ng/ml Final     Comment:     2.00-9.99 ng/ml  High risk for progression to severe sepsis.  Recommendation: Strongly recommend initiating or continuing antibiotics.   Evaluate culture results and clinical features to target antibacterial   therapy. Obtain blood cultures and other relevant cultures if not done. Repeat   PCT in 2 days to guide antibiotic de-escalation. Consider de-escalating   antibiotics when PCT concentration is <80% of peak or abs PCT <0.5.     01/21/2020 5.08 (HH) ng/ml Final     Comment:     2.00-9.99 ng/ml  High risk for progression to severe sepsis.  Recommendation: Strongly recommend initiating or continuing antibiotics.   Evaluate culture results and clinical features to target antibacterial   therapy. Obtain blood cultures and other relevant cultures if not done. Repeat   PCT in 2 days to guide antibiotic de-escalation. Consider de-escalating   antibiotics when PCT concentration is <80% of peak or abs PCT <0.5.  Critical Value called to and read back by  ESTEFANY CHOW @ 9904 ON 01/21/20 BY [facility]       CRP   CRP Inflammation   Date Value Ref  [facility] Status   10/31/2019 39.5 (H) 0.0 - 8.0 mg/L Final   10/30/2019 50.0 (H) 0.0 - 8.0 mg/L Final   01/24/2019 19.0 (H) 0.0 - 8.0 mg/L Final       Culture Results:   (1/21/20) = Blood  (1/21/20) Gram Stain =      Recommendations/Interventions:  1. Zosyn for sepsis should be a dose of 4.5 grams; however primary problem in chart lists CAP. Will clarify indication with provider    Sherman Sanders, [facility]  January 22, 2020

## 2020-01-22 NOTE — ANESTHESIA POSTPROCEDURE EVALUATION
Patient: Ramo Berry    Procedure(s):  Upper endoscopy with frozen biopsy and balloon dilating of duodenum    Diagnosis:Iron deficiency anemia due to chronic blood loss [D50.0]  Chronic nausea [R11.0]  Diagnosis Additional Information: No value filed.    Anesthesia Type:  MAC    Note:  Anesthesia Post Evaluation    Patient location during evaluation: ICU  Patient participation: Unable to participate in evaluation secondary to underlying medical condition  Post-procedure mental status: sedated and intubated.  Pain management: unable to assess  Airway patency: patent  Cardiovascular status: stable  Respiratory status: ETT and ventilator  Hydration status: stable  PONV: unable to assess     Anesthesia complication: emergency intubation, respiratory arrest.          Last vitals:  Vitals:    01/22/20 1500 01/22/20 1505 01/22/20 1510   BP: 90/57 (!) 83/60 (!) 86/59   Pulse:      Resp: (!) 27  26   Temp:      SpO2: (!) 87%  (!) 86%         Electronically Signed By: QUENTIN Grant CRNA  January 22, 2020  3:56 PM

## 2020-01-22 NOTE — ANESTHESIA PROCEDURE NOTES
ARTERIAL LINE PROCEDURE NOTE:   Pre-Procedure  Performed by  Martha Roger APRN CRNA   Location:   Procedure Times:1/22/2020 2:30 PM and 1/22/2020 2:40 PM  Pre-Anesthestic Checklist: patient identified, IV checked, site marked, risks and benefits discussed, informed consent, monitors and equipment checked, pre-op evaluation, at physician/surgeon's request and post-op pain management    Timeout  Correct Patient: Yes   Correct Procedure: Yes   Correct Site: Yes   Correct Laterality: Yes   Correct Position: Yes   Site Marked: Yes   .   Procedure Documentation  Procedure: arterial line  Diagnosis:Respiratory arrest ASA 4  Supine  Insertion Site:left..  .  Patient Prep/Sterile Barriers; all elements of maximal sterile barrier technique followed, mask, hat, sterile gown, sterile gloves, draped, hand hygiene, chlorhexidine gluconate and isopropyl alcohol    Assessment/Narrative    Catheter: 20 gauge, 12 cm     Secured by other  Tegaderm dressing used.    Arterial waveform: Yes IBP within 10% of NIBP: Yes

## 2020-01-22 NOTE — PLAN OF CARE
Dr. Bradley here to do  bronchoscopy .     1507 - Time out called; all in agreement; paused procedure to obtain correct equipment.    1528 - Resumed bronchoscopy; ND, RN's RT and anesthesia at bedside.     1531 - MD is performing a lavage.     1645 - Dr. Pearce at bedside to do Central Line placement. Timeout called; all in agreement.    *NO CONSENTS SIGNED FOR EITHER PROCEDURE D/T EMERGENT CONDITION*    1730- xray verified central placement.    1700 - wife at bedside.     1800 - Dr. Richards spoke with family @ bedside.     Nurses continue to monitor.

## 2020-01-22 NOTE — PROGRESS NOTES
"CLINICAL NUTRITION SERVICES  -  ASSESSMENT NOTE    Ramo Berry : Admission Nutrition Risk Screen - unintentional weight loss    59 yom admitted for CAP. Per care everywhere, pt has a hx of colitis, GERD, and hypertension. Pt is down for a EGD. Pt has been having episodes of nausea and vomiting for several weeks resulting in weight loss. In the progress notes, hospitalist documents pt appears cachetic, muscle wasting and subcutaneous fat loss.    Diet Order: NPO for procedure  Intake: % of 2 meals documented    Height: 5' 10\"  Weight: 137 lbs 9.07 oz  Body mass index is 19.74 kg/m .  Weight Status:  Normal BMI  IBW: 74kg  Weight History: 21% (37lb) weight loss in 3 months (10/21/19 to 1/21/20)  Wt Readings from Last 10 Encounters:   01/21/20 62.4 kg (137 lb 9.1 oz)   10/31/19 72 kg (158 lb 11.7 oz)   10/25/19 77.1 kg (169 lb 14.4 oz)   10/21/19 79.1 kg (174 lb 7 oz)   07/09/19 77.6 kg (171 lb)   07/02/19 77.6 kg (171 lb)   01/25/19 79.4 kg (175 lb)   01/24/19 79.4 kg (175 lb)   08/19/18 81.6 kg (180 lb)   05/14/18 75 kg (165 lb 4 oz)     Calculated using admit weight- 62.4kg  Estimated Energy Needs: 9961-2596 kcals (30-35 Kcal/Kg)   Estimated Protein Needs: 75-95 grams protein (1.2-1.5 g pro/Kg)    MALNUTRITION:  % Weight Loss:  > 7.5% in 3 months (severe malnutrition)  % Intake:  </= 75% for >/= 1 month (severe malnutrition)  Muscle and Subcutaneous Fat Loss:  Noted in progress notes    Malnutrition Diagnosis: Severe malnutrition  In Context of:  Chronic illness or disease    NUTRITION DIAGNOSIS:  Malnutrition related to inadequate energy intake as evidenced by 20% weight loss in 3 months with muscle and subcutaneous fat loss.    NUTRITION RECOMMENDATIONS  - Suggest Ensure Enlive sent with all meals once diet advances  - Encourage frequent meals/snacks    MONITORING AND EVALUATION:  RD will monitor diet order, intake, weight, labs          "

## 2020-01-22 NOTE — ANESTHESIA PREPROCEDURE EVALUATION
Anesthesia Pre-Procedure Evaluation    Patient: Ramo Berry   MRN: [id number] : 1960          Preoperative Diagnosis: Iron deficiency anemia due to chronic blood loss [D50.0]  Chronic nausea [R11.0]    Procedure(s):  Upper endoscopy    Past Medical History:   Diagnosis Date     Anxiety state, unspecified 2012     Back Pain 2012     Cervical disc disease 2012     Chronic pain syndrome 2012     Community acquired bacterial pneumonia 3/27/2018     Fracture of thoracic spine (H) 2013     Loculated pleural effusion 2018    right     Past Surgical History:   Procedure Laterality Date     ARTHROSCOPY SHOULDER, OPEN BICEP TENODESIS REPAIR, COMBINED Right 2017    Procedure: COMBINED ARTHROSCOPY SHOULDER, OPEN BICEP TENODESIS REPAIR;;  Surgeon: Talha Farrell DO;  Location: [facility] OR     ARTHROSCOPY SHOULDER, OPEN ROTATOR CUFF REPAIR, COMBINED Right 2017    Procedure: COMBINED ARTHROSCOPY SHOULDER, OPEN ROTATOR CUFF REPAIR;  RIGHT SHOULDER ARTHROSCOPY WITH Open  REPAIR OF ROTATOR CUFFand BICEP TENDODESIS;  Surgeon: Talha Farrell DO;  Location: [facility] OR     C6 C7 cervical fusion       cataract extraction and lens implantation      Bilateral      cystoscopy with biopsies      hematuria     fusion L5 S1       L4 L5 spinal fusion       LUNG SURGERY Right 2018    Surgery at [facility] to remove infection. Started with pneumonia got worse.     MOUTH SURGERY       ORTHOPEDIC SURGERY Right 2016    rotator cuff surgery      THORACOTOMY  2018    right video assisted thoracoscopic surgery       Anesthesia Evaluation     .             ROS/MED HX    ENT/Pulmonary: Comment: Loculated pleural effusion    (+)tobacco use, , . .    Neurologic:       Cardiovascular: Comment: AAA    (+) hypertension----. : . . . :. .       METS/Exercise Tolerance:     Hematologic: Comments: Hgb 7.4    (+) Anemia, History of Transfusion -      Musculoskeletal: Comment: S/P cervical spinal  fusion  S/P lumbar spinal fusion      (+) arthritis,  other musculoskeletal- chronic cervical and back pain      GI/Hepatic: Comment: Erosive esophagitis        Renal/Genitourinary:         Endo:         Psychiatric:     (+) psychiatric history anxiety      Infectious Disease:         Malignancy:   (+) Malignancy History of Lung          Other:    (+) No chance of pregnancy H/O Chronic Pain,H/O chronic opiod use ,                         Physical Exam      Airway   Mallampati: IV  TM distance: >3 FB  Neck ROM: limited  Comment: Cervical fusion, neck pain  Swollen tongue  Small mouth opening    Dental   (+) missing, loose and chipped    Cardiovascular   Rhythm and rate: regular and abnormal  (+) murmur     PE comment: tachycardia    Pulmonary   PE comment: Coarse throughout, diminished in bases            Lab Results   Component Value Date    WBC 5.4 01/22/2020    HGB 7.6 (L) 01/22/2020    HCT 24.1 (L) 01/22/2020     01/22/2020    CRP 39.5 (H) 10/31/2019    SED 10 08/19/2018     01/22/2020    POTASSIUM 3.3 (L) 01/22/2020    CHLORIDE 102 01/22/2020    CO2 28 01/22/2020    BUN 11 01/22/2020    CR 0.51 (L) 01/22/2020    GLC 93 01/22/2020    LUIS 6.5 (L) 01/22/2020    PHOS 3.1 10/31/2019    MAG 1.5 (L) 01/22/2020    ALBUMIN 1.2 (L) 01/22/2020    PROTTOTAL 5.1 (L) 01/22/2020    ALT 21 01/22/2020    AST 27 01/22/2020    ALKPHOS 98 01/22/2020    BILITOTAL 0.2 01/22/2020    BILIDIRECT 0.09 07/09/2015    LIPASE 84 01/24/2019    AMYLASE 31 08/08/2014    INR 1.2 03/04/2016    TSH 3.52 07/18/2017       Preop Vitals  BP Readings from Last 3 Encounters:   01/22/20 105/67   10/31/19 162/100   10/25/19 (!) 185/116    Pulse Readings from Last 3 Encounters:   01/22/20 102   10/30/19 88   10/25/19 74      Resp Readings from Last 3 Encounters:   01/22/20 20   10/31/19 18   10/25/19 16    SpO2 Readings from Last 3 Encounters:   01/22/20 96%   10/31/19 96%   10/25/19 92%      Temp Readings from Last 1 Encounters:   01/22/20  "99.3  F (37.4  C) (Temporal)    Ht Readings from Last 1 Encounters:   01/21/20 1.778 m (5' 10\")      Wt Readings from Last 1 Encounters:   01/21/20 62.4 kg (137 lb 9.1 oz)    Estimated body mass index is 19.74 kg/m  as calculated from the following:    Height as of this encounter: 1.778 m (5' 10\").    Weight as of this encounter: 62.4 kg (137 lb 9.1 oz).       Anesthesia Plan      History & Physical Review  History and physical reviewed and following examination; no interval change.    ASA Status:  4 .    NPO Status:  > 8 hours    Plan for MAC Reason for MAC:  Chronic cardiopulmonary disease (G9), Deep or markedly invasive procedure (G8) and Procedure to face, neck, head or breast         Postoperative Care      Consents  Anesthetic plan, risks, benefits and alternatives discussed with:  Patient.  Use of blood products discussed: Yes.   Use of blood products discussed with Patient.  Consented to blood products.  .                 QUENTIN Johnson CRNA  "

## 2020-01-22 NOTE — OP NOTE
Procedure Date: 01/22/2020      PREOPERATIVE DIAGNOSES:   1.  Failure to thrive.   2.  Chronic nausea.   3.  Gastric outlet obstruction.     4.  Gastrointestinal bleed.   5.  Anemia.      POSTOPERATIVE DIAGNOSES:   1.  Gastric outlet obstruction in duodenum.     2.  Gastritis.     3.  Pulmonary edema.      PROCEDURE:  Esophagogastroduodenoscopy and dilation.      HISTORY OF PRESENT ILLNESS:  Mr. Berry was admitted yesterday with a 2-month history of chronic nausea and weight loss and failure to thrive at home.  A CT chest, abdomen and pelvis demonstrated a massively distended stomach and esophagus concerning for gastric outlet obstruction, was anemic in the 7 with a positive iFOBT suggesting GI bleed.  Here for diagnostic endoscopy.      SURGEON:  Sourav Pearce DO.      DESCRIPTION OF PROCEDURE:  The patient was brought into the endoscopy suite and placed in the left lateral decubitus position.  After preprocedural pause and attended monitored anesthesia was administered, the endoscope was advanced through the oral bite block through the oropharynx, past the cricopharyngeus and intubated the esophagus.  Under direct visualization, the endoscope was advanced down the esophagus, through the stomach, past the pylorus and into the duodenal bulb.  The scope could not be advanced beyond the duodenal bulb, secondary to extreme inflammation causing near complete obstruction.  There was a small window at this location and I could see through that narrowing, distally seeing that that was free of any obstruction distal to the acute narrowing there.  This had the appearance of acute inflammation.  Biopsy was obtained and sent for frozens to check for malignancy.  This returned normal small bowel.  I then elected to attempt to dilate this area with a balloon dilation at 10-Persian.  This was held for 1 minute and let down.  I then elected to dilate a second time to a 12-Persian.  At 11.5 some bleeding occurred and we quit  attempts from going higher.  Around this point, anesthesia was having difficulty maintaining his saturations.  He had up to this point been doing very well.  The balloon was released and withdrawn.  The endoscope then was withdrawn and our efforts then turned towards maintaining his saturations.  His sats did get down below 60 for just a couple of seconds.  An oral airway was placed and a bag mask valve.  We were able to get the saturations up to the mid to high 60s.  The patient was not improving his saturations.  There was concern of an acute pulmonary event, possibly aspiration.  The breath sounds were bilateral, and I made the decision that we should intubate the patient.  This was done by Anesthesia.  Breath sounds were bilateral while manually bagging.  The patient's saturation on 100% FiO2 was able to get up to the low 80s.  A chest x-ray confirmed the ET tube 4-5 cm above the letty.  No pneumothorax.  There was bilateral pulmonary edema.  There was no air underneath the diaphragm, suggesting any abdominal perforation.  The patient then was transferred up to the ICU for further care unit. His respiratory failure was thought to be from pulmonary edema secondary to aspiration during the EGD.         BASHIR BRUMFIELD DO             D: 2020   T: 2020   MT: WT      Name:     JENNIE BARNETT   MRN:      [id number]        Account:        [id number]   :      1960           Procedure Date: 2020      Document: [id number]

## 2020-01-22 NOTE — PLAN OF CARE
"Reason for hospital stay:  Gastric outlet obstruction/ CAP  Living situation PTA: Home with wife  Most recent vitals: /67   Pulse 102   Temp 99.3  F (37.4  C) (Temporal)   Resp 20   Ht 1.778 m (5' 10\")   Wt 62.4 kg (137 lb 9.1 oz)   SpO2 96%   BMI 19.74 kg/m      Pain Management:  7-8 out of a 10 received both PO and oral medications  LOC: Alert and oriented  Cardiac: Regular and tachycardic   Respiratory:  Lung sounds in upper lobes are clear. Crackles were heard in bilateral bases with the right side being diminished. Started O2 on 5 liters and titrated to 4LPM nasal cannula.   GI:  NG tube in place on low intermittent suction. Bowel sounds in all 4 quadrants hypoactive. Abdomen slightly distended. Had c/o nausea with no request of antimetics.   :  Joseph inplace, cath cares have been completed and 650 ml output.   Skin Issues:  Multiple scratches, abrasions, and bruises.      IVF: Normal saline at 100 mL/hr  ABX:  Zosyn     Nutrition: Advanced to a clear liquid diet  ADL's:  assist of 2   Ambulation: assist of 2 with gait belt and walker from bed to chair.   Safety: Bed and chair alarms in use.     Discharge care conference held.   Comments: sent pt down to surgery at   1240 EDG received message from charge nurse at 1440 pt expected to return to ICU.    1/22/2020  1:49 PM  Liv Randall RN   "

## 2020-01-22 NOTE — SIGNIFICANT EVENT
Called to OR by Dr. Pearce. Please sees surgical note. Patient was intubated post EGD for sudden hypoxia. He continues to have significant hypoxia after intubation. Peep increased. His wife was not here during the procedure. I called her at home and told her he had suddenly decompensated in surgery, she is on her way now. On arrival to the floor he again had worsening hypoxia after having stabilized prior to leaving the PACU. Dr. Bradley was on the floor at the time and assessed the patient. Post-intubation CXR shows new left lung opacities. Concerned he may have aspirated prior to intubation. Differential includes ARDS.  Dr. Bradley was on the floor at the time and assessed the patient. He will perform emergent bronchoscopy to evaluate for mucous plugging etc. Will get cytology etc if able to do washings.

## 2020-01-22 NOTE — PLAN OF CARE
"Patient continues to c/o neck and back pain. He is more often than not, found with his head hung down. Attempting to keep head of bed at 30 degrees or less to help with gravity in keeping his head in a normal position. This is also helping keep his airway open. Patient has slept most of the day but does ask for pain medication when he's woken or wakes up. He is rating neck pain 6-7/10. Norco PO was given once on day shift and once on evening shift. Patient had requested pain medication to the nursing assistant but was found sleeping just before 1300, he didn't wake up much while vital signs were being obtained. After NG was placed towards the end of evening shift, he did request pain medication because \"my face hurts.\" Dilaudid IV was given at that time and patient slept through mathews catheter placement. Patient has been lethargic, he is difficult to understand at times. Right hand is more flaccid, he has trouble using it. VSS with the exception of intermittent low grade temps of 99.0's and tachycardic HR in low 100's. O2 sat initially 85% @ 5 LPM NC. RT increased him to 10 LPM on HFNC. Oxygen was weaned to 4 LPM by the start of evening shift. Patient was found without oxygen on towards the end of evening shift, O2 sat was 93% so he was left on room air. Lung sounds had scattered rhonchi and course. He was encouraged to cough, he stated that he didn't have to but was told to cough and it was very congested, loose. Wife was encouraged to encourage patient to cough as well. Day shift oral intake adequate. He had 3 Ensure's and cream of wheat. Patient also had 360 ml worth of 2 ice creams and part of an Ensure on evening shift. He had no c/o nausea. Patient did not void on day shift, bladder scan revealed 672 ml. Straight catheter with caudae output 900 ml. He was unable to void again on evening shift so per order, mathews catheter was placed. Patient has been incontinent of stool all day, constantly smearing. Dulcolax " supp given towards end of day shift. Stool felt in rectum. Buttocks is red but blanchable. Barrier cream being applied. Turn Q2H. Elevating heels. He was up in the chair for a bit on day shift. Remains assist of 2 for very short pivot transfers. NG placed on evening shift had 500 ml of orangish returns immediately. Alarms in place. Call light within reach. IVF and IV antibiotic continue. Wife has been present all day, she is spending the night.     Face to face report given with opportunity to observe patient.    Report given to Terrance CANSECO.    Angeline Perez RN   1/22/2020  2:04 AM

## 2020-01-22 NOTE — PROVIDER NOTIFICATION
MD updated RE: NG placed, returns of about 600 ml sherbet colored liquid. Patient denies nausea. Question if MD would like placement check with xray, declines with amount of returns so far. Patient is c/o pain, only has Tylenol and Norco PO ordered. MD placing orders.

## 2020-01-22 NOTE — OP NOTE
[facility]   Operative Note    Pre-operative diagnosis: Post procedural hypoxia   Post-operative diagnosis Mucus plugging of left main stem bronchus   Procedure: Bronchoscopy with washing   Surgeon(s): Joseph Bradley MD   Estimated blood loss: * No values recorded between 1/22/2020  1:22 PM and 1/22/2020  2:16 PM *    Specimens: Bronchial washing   Findings: Thick mucus in left main bronchus     Description of procedure:   The patient was sedated and previously intubated. A timeout was done with the staff in attendance. The broncoscope was passed through the ET tube. At the letty there was thick brown mucus in the left main stem. There was no mucus noted in the right side. The scope was passed into the left mainstem and the mucus was aspirated. Bronchial washings were performed and specimens retrieved. The scope again was passed to the secondary bronchials with aspirations of mucus in the left side. The right main stem was evaluated along with the secondary bronchials with no thick mucus. During the procedure the patient started to desaturate and after the washings and aspiration the scope was withdrawn. The patients saturation then elevated to >90%. There were no procedural complications.    Joseph Bradley MD

## 2020-01-22 NOTE — PROCEDURES
[facility]    Central line  Date/Time: 1/22/2020 5:34 PM  Performed by: Sourav Pearce DO  Authorized by: Sourav Pearce DO   Indications: vascular access    UNIVERSAL PROTOCOL   Site Marked: No  Prior Images Obtained and Reviewed:  Yes  Required items: Required blood products, implants, devices and special equipment available    Patient identity confirmed:  Hospital-assigned identification number and arm band  Patient was reevaluated immediately before administering moderate or deep sedation or anesthesia  Confirmation Checklist:  Patient's identity using two indicators, relevant allergies, procedure was appropriate and matched the consent or emergent situation and correct equipment/implants were available  Time out: Immediately prior to the procedure a time out was called    Universal Protocol: the Joint Commission Universal Protocol was followed    Preparation: Patient was prepped and draped in usual sterile fashion    ESBL (mL):  10         ANESTHESIA    Anesthesia: Local infiltration  Local Anesthetic:  Lidocaine 1% without epinephrine  Anesthetic Total (mL):  10      SEDATION    Patient Sedated: Yes    Sedation Type:  Deep  Vital signs: Vital signs monitored during sedation      Preparation: skin prepped with 2% chlorhexidine  Skin prep agent dried: skin prep agent completely dried prior to procedure  Sterile barriers: all five maximum sterile barriers used - cap, mask, sterile gown, sterile gloves, and large sterile sheet  Hand hygiene: hand hygiene performed prior to central venous catheter insertion  Location details: right internal jugular  Patient position: flat  Catheter type: triple lumen  Catheter size: 8.5 Fr  Pre-procedure: landmarks identified  Ultrasound guidance: yes  Sterile ultrasound techniques: sterile gel and sterile probe covers were used  Number of attempts: 3  Successful placement: yes  Post-procedure: line sutured and dressing applied  Assessment: blood return  through all ports,  free fluid flow,  placement verified by x-ray and no pneumothorax on x-ray  Complications: local hematoma    PROCEDURE   Patient Tolerance:  Patient tolerated the procedure well with no immediate complications  Describe Procedure: Initial attempt was with landmarks.  These were unsuccessful and total of 3 attempts.  Ultrasound was brought into the field and the right internal jugular was accessed, wire introduced, dilated using Seldinger technics and triple lumen placed with SVC at 17 cm, sutured into place, aspirated and flushed easily, caps applied.   Length of time physician/provider present for 1:1 monitoring during sedation: 30

## 2020-01-22 NOTE — BRIEF OP NOTE
[facility]    Brief Operative Note    Pre-operative diagnosis: Iron deficiency anemia due to chronic blood loss [D50.0]  Chronic nausea [R11.0]  Post-operative diagnosis gastric outlet obstruction in duodenum, gastritis, pulmonary edema    Procedure: Procedure(s):  Upper endoscopy with frozen biopsy and balloon dilating  Surgeon: Anesthesia: Monitor Anesthesia Care   Estimated blood loss: None  Drains: None  Specimens:   ID Type Source Tests Collected by Time Destination   A :  Biopsy Small Intestine, Duodenum SURGICAL PATHOLOGY EXAM Sourav Pearce DO 1/22/2020  1:39 PM      Findings:   nearly oclusive obstruction in 2nd part of the duodeum, desaturation secondary to pulmonary edema requiring intubation.  Complications: pulmonary edema.  Implants: * No implants in log *

## 2020-01-22 NOTE — PROGRESS NOTES
[facility]    Hospitalist Progress Note    Date of Service (when I saw the patient): 01/21/2020    Assessment & Plan       CAP (community acquired pneumonia): Large area of consolidation to the right lung. He has a mild cough, denies fevers at home, does endorse dyspnea over the course of the last month. No fevers since arrival, no leukocytosis but he does have mildly elevated procalcitonin at 5.08 today. CT scan done to better differentiate this large areas that was not present on imaging done 10/31. CT scan shows RLL air fluid levels suggesting loculated or multiple abscesses. He has a prior history of R lung pneumonia>parapneumonic effusion>VATS procedure in 2018. Continue high dose Zosyn as cannot rule out pseudomonas as he does have risk factors. Will need to discuss VATS again with pulmonology once we get the gastric outlet obstruction cause is identified. He is stable and improving on antibiotics.   -1/22: Procal down to 3.89 from 5. Feels breathing and cough have improved. Unable to produce sputum specimen. Oxygen stable on 2-4 liters, he was able to wean down to room air during the night for several hours but then required oxygen again.     Addendum: Respiratory arrest with hypoxia during EGD procedure, requiring intubation. He was spontaneously breathing prior to intubation but sats dropped from high 90's down to 50%. Per report even after intubation his sats were slow to recover and only improved to 80's at best. He arrived on the floor, had another desaturation episode. He had thick sputum suctions with some blood tinging noted. CXR confirmed ETT placement and showed new left lung consolidation. Because of his continued severe and prolonged hypoxia despite mechanical ventilation bronchoscopy was performed in attempt to lavage and clear any mucous plugging. Please see procedure noted by Dr. Bradley. His wife was contacted and discussed his very grave condition. He was given 4mg versed and  oxygenation improved a did his lung sounds with improved air movement. With this in mind versed drip was started. After the initial versed dose of 4 mg with propofol running he did have some hypotension that quickly resolved with reducing the propofol and 500cc bolus. Central line was placed. He continued to have significant hypoxia in the 60's-80's. His temperature was cool to touch. ABG shows no acidosis/alkolosis but paO2  65/pco2 52. repeat ABG's 1 hour later showed worsening hypoxia. He began to arouse with elevated blood pressures, increase tachycardia and vent dyssynchrony. He as given 5mg Vecuronium and responded well to this with improving saturations. Care is turned over to Dr. Richards at 1700.     Acute respiratory failure with hypoxia and hypercapnea: As above. Etiology unclear, acute aspiration event versus ARDS. Mucous plugging likely a factor as well with suddenness of onset.     Nausea/Vomiting: patient states for the last several weeks to a month he has randomly been vomiting. With or without eating. He is a poor historian and neither he nor his wife can give a clearer picture of onset of symptoms and any worsening/relieving factors.  He has prior history of NSAID-induced duodenal ulcer and GI bleeding. He has not vomited since arrival. However, CT scan shows probable gastric outlet obstruction. NPO, NGT placed, consult surgery-Dr. Pearce for EGD in AM.  -1/22: Dr. Pearce to see, plan on EGD this afternoon. NGT placed with 500ml returns last night. He is aware of the plan and agreeable.    Profound weakness: Likely combination of deconditioning after lumbar surgery,anemia and malnutrition. Unable to get off the commode prior to arrival even with help of his wife. PT/OT evaluation.     Severe Malnutrition: Albumin low at 1.7, worsened this morning at 1.2 after IV fluids. Cachetic, muscle wasting and subcutaneous fat loss noted. He has had a 21 pound weight loss in just under 3 months. Due to above  issues. Suspect underlying process not explained by gastric outlet obstruction alone.    Electrolyte abnormalities: Hypokalemia, hypomagnesemia due to poor oral intake plus vomiting. He does have low calcium but when corrected for albumin it is in the normal range. IV replacement Mag and potassium per protocol. No calcium replacement needed.   -1/21: Continue replacement protocol.      Anemia: Unknown chronicity but his normal blood pressures and heart rate would suggest subacute. He was slightly anemic 10/30/19, previous to that he did not have anemia. He is a very poor historian. There have been no signs of acute bleeding, he is in fact constipated.    Constipation: Unable to verbalize chronicity. KUB shows moderate stools, significant amount of stool in rectum. Will start with bisacodyl suppository.   -1/21: He has had several small bowel movements, now starting to pass some hard stool. Will repeat suppository after EGD today if no significant results.     Urinary retention: patient denies previous history. Has been unable to urinate since arrival. Straight cath x1 900 ml this morning. Again unable to void x6 hours, mathews placed.       DVT Prophylaxis: Pneumatic Compression Devices  Code Status: Full Code    Disposition: Expected discharge in 2-3 days once evaluation complete.    Nataliia Hall, CNP    Interval History   Denies nausea. NGT bothering him.  Denies any abdominal pain now or previous to arrival. Feels overall breathing has improved.    -Data reviewed today: I reviewed all new labs and imaging results over the last 24 hours.    Physical Exam   Temp: 99.7  F (37.6  C) Temp src: Tympanic BP: 113/72 Pulse: 102 Heart Rate: 102 Resp: 20 SpO2: 96 % O2 Device: High Flow Nasal Cannula (HFNC) Oxygen Delivery: 4 LPM  Vitals:    01/21/20 0341   Weight: 62.4 kg (137 lb 9.1 oz)     Vital Signs with Ranges  Temp:  [98.1  F (36.7  C)-99.7  F (37.6  C)] 99.7  F (37.6  C)  Pulse:  [] 102  Heart Rate:   [] 102  Resp:  [16-23] 20  BP: ()/(63-78) 113/72  SpO2:  [88 %-98 %] 96 %  I/O last 3 completed shifts:  In: 3873 [P.O.:840; I.V.:3033]  Out: 1550 [Urine:1550]    Peripheral IV 01/21/20 Right Upper forearm (Active)   Site Assessment WDL 1/22/2020  8:00 AM   Line Status Infusing 1/22/2020  8:00 AM   Phlebitis Scale 0-->no symptoms 1/22/2020  8:00 AM   Infiltration Scale 0 1/22/2020  8:00 AM   Number of days: 1       NG/OG/NJ Tube Nasogastric 16 fr Left nostril tolerated well (Active)   Site Description WDL 1/22/2020  8:00 AM   Status Suction-low intermittent 1/22/2020  8:00 AM   Drainage Appearance Other (comment) 1/22/2020  8:00 AM   Placement Confirmation Sullivan unchanged 1/22/2020  8:00 AM   Sullivan (cm marking) at nare/mouth 50 cm 1/22/2020  8:00 AM   Container Amount 500 mL 1/21/2020  9:50 PM   Number of days: 1       Urethral Catheter (Active)   Tube Description Positional 1/22/2020  8:00 AM   Catheter Care Catheter wipes 1/22/2020  8:00 AM   Collection Container Standard 1/22/2020  8:00 AM   Securement Method Securing device (Describe) 1/22/2020  8:00 AM   Rationale for Continued Use Retention 1/22/2020  8:00 AM   Urine Output 650 mL 1/22/2020  1:20 AM   Number of days: 1       Wound 10/31/19 Posterior;Right Elbow Skin tear Starting to scab, superficial skin layer pulled back some. 20 mm x 10 mm (Active)   Number of days: 83     Line/device assessment(s) completed for medical necessity    Constitutional: Awake,alert, very ill appearing  Respiratory: Diminished right mid to base with scattered wheezing bilaterally.   Cardiovascular: HRR, no murmurs, rubs,thrills.   GI: Soft,nontender, bowel sounds are positive but hypoactive.   Skin/Integumen: Pale to jaundiced, scattered bruises.       Medications     sodium chloride 100 mL/hr at 01/22/20 1202       budesonide  0.5 mg Nebulization BID     ipratropium - albuterol 0.5 mg/2.5 mg/3 mL  3 mL Nebulization 4x daily     metoprolol  5 mg Intravenous Q6H      nicotine   Transdermal Q8H     pantoprazole (PROTONIX) IV  40 mg Intravenous Daily with breakfast     piperacillin-tazobactam  4.5 g Intravenous Q6H     sodium chloride (PF)  3 mL Intracatheter Q8H       Data   Recent Labs   Lab 01/22/20  0520 01/21/20  1030 01/21/20  0128 01/21/20  0044   WBC 5.4  --   --  8.1   HGB 7.6*  --   --  9.5*   MCV 83  --   --  83     --   --  297     --  133  --    POTASSIUM 3.3* 3.5 2.8*  --    CHLORIDE 102  --  90*  --    CO2 28  --  29  --    BUN 11  --  13  --    CR 0.51*  --  0.54*  --    ANIONGAP 6  --  14  --    LUIS 6.5*  --  7.3*  --    GLC 93  --  63*  --    ALBUMIN 1.2*  --  1.7*  --    PROTTOTAL 5.1*  --  6.2*  --    BILITOTAL 0.2  --  0.4  --    ALKPHOS 98  --  133  --    ALT 21  --  20  --    AST 27  --  24  --    TROPI  --   --  <0.015  --        Recent Results (from the past 24 hour(s))   XR Abdomen Port 1 View    Narrative    XR ABDOMEN PORT 1 VW, 1/21/2020 1:42 PM    History: Male, age 59 years; vomiting    Comparison: None.    Technique: Single view .    FINDINGS: Moderate volume of stool is seen within the colon. Bowel gas  pattern is unremarkable. Extensive postoperative changes are seen  within the lower lumbar spine/lumbosacral region.      Impression    IMPRESSION:   No evidence of obstruction.    Moderate volume of stool.    BASHIR HAMMER MD   CT Chest/Abdomen/Pelvis w Contrast    Narrative    CT CHEST/ABDOMEN/PELVIS W CONTRAST    CLINICAL HISTORY: Male, age 59 years, large cavitary mass right lung  combined with weight loss, abdominal pain and vomiting x1 month. No  oral contrast.;    Comparison:  CT scan abdomen and pelvis 1/24/2019    TECHNIQUE:  CT was performed of the chest, abdomen and pelvis  after  the administration of IV  contrast.   Sagittal, coronal and axial reconstructions were reviewed.     FINDINGS:  Chest CT:   Lungs : The right lower lobe demonstrates near complete consolidation  with numerous air-fluid levels suggesting  numerous abscesses and/or  complex abscess formation. There is interstitial thickening,  groundglass opacification nodular consolidation involving the  posterior segment of the right lower lobe. Right upper and right  middle lobes are spared. Left lung appears clear.    Thyroid: The visualized portions are normal.  Heart and Great Vessels:  No evidence of acute abnormality. Thoracic  aorta is intact. No evidence of apparent embolus.  Lymph Nodes:  Mildly enlarged right hilar nodes and right  paraesophageal nodes.  Pleura: Minimal volume of right-sided pleural fluid.  Bony Structures:  No acute abnormality. There are mild degenerative  changes of the thoracic spine. Respiratory motion limits evaluation of  the sternum.  Esophagus: The esophagus is dilated and fluid-filled.    Abdomen/Pelvis CT:  Stomach and duodenum: Stomach is distended with large volume of  material.  Liver:  Low dense focus is again seen near the falciform ligament  suggesting fatty infiltration. Gas is now seen within the biliary  tree.  Gallbladder: Small volume of gas is seen within the gallbladder lumen.  Spleen: Normal.  Pancreas: Normal.  Adrenal Glands: Normal.  Kidneys: Kidneys are similar in appearance again demonstrating  cortical cysts and scarring in the upper pole the right kidney.  Ureters: Not well seen. Visualized portions are grossly normal. Streak  artifact arising from metallic hardware within the lumbar spine limits  evaluation.  Abdominal Aorta: 4.2 cm fusiform infrarenal aneurysm is larger,  previously measuring 3.7 cm. No evidence of acute leak.  IVC: Normal.  Lymph Nodes: Number of retroperitoneal nodes appear to be increased  slightly in size and number.  Urinary bladder: Normal.  Large and Small Bowel: Large volume of stool seen within the rectum.  Diverticulosis of the sigmoid colon is again seen with circumferential  wall thickening. No distinct evidence of diverticulitis.    Pelvic Organs:  Normal.  Peritoneum:  Moderate free fluid in the lower pelvis.  Bony structures: Normal.    Other Findings:  Inguinal lymph nodes are normal.      Impression    IMPRESSION:   Consolidation of the right lower lobe with a number of air-fluid  levels concerning for an intraparenchymal lung abscess, possibly  related to aspiration pneumonia. The esophagus is dilated and  fluid-filled. Stomach is again markedly distended concerning for  gastric outlet obstruction.    Enlarging lymph nodes in the right hilum and mediastinum as well as in  the retroperitoneum suggesting reactive lymphadenopathy.    Slight interval enlargement of 4.2 cm fusiform infrarenal abdominal  aortic aneurysm without evidence to suggest acute leak.    Small volume of free fluid within the lower pelvis. No evidence of  free peritoneal right peritoneal gas.    Pneumobilia and small volume of gas within the gallbladder lumen  suggesting sphincterotomy.    Other chronic changes throughout the abdomen/pelvis are similar in  appearance including diverticulosis of the sigmoid colon. No distinct  evidence of diverticulitis.    BASHIR HAMMER MD

## 2020-01-22 NOTE — PROGRESS NOTES
Managing ventilator of this intubated patient cared for primarily by Nataliia Hall CNP.  Oral ET tube in place chest x-ray confirms placement.  He is on assist control rate of 14 with tidal volume 500, 10 of PEEP 100% FiO2.  Sedated with propofol now with Versed.  Still remaining borderline hypoxic.

## 2020-01-22 NOTE — PLAN OF CARE
Discharge Planner PT   Patient plan for discharge: SNF  Current status: Patient willing to participate in skilled PT intervention. Performed supine to sit with min Ax1 with increased time. Patient performed sit to stand to FWW with min Ax2. Ambulated 5 feet to chair with CGA and cueing on mechanics with FWW. Performed stand to sit transfer with CGA and cueing. Patient was fatigued with transfers, but felt they went better than he did yesterday. Discussed more walking and mobility for tomorrow's session and patient was on board  Barriers to return to prior living situation: Poor activity tolerance  Recommendations for discharge: SNF  Rationale for recommendations: Due to current functional status       Entered by: Bibi Petit 01/22/2020 12:20 PM

## 2020-01-23 NOTE — PLAN OF CARE
Provider at bedside intermittently. Discussing patients status and plan of care going froward with family and children.

## 2020-01-23 NOTE — PLAN OF CARE
Physical Therapy Discharge Summary    Reason for therapy discharge:    Pt      Progress towards therapy goal(s). See goals on Care Plan in [facility] electronic health record for goal details.  NA     Therapy recommendation(s):    NA

## 2020-01-23 NOTE — PLAN OF CARE
Medical Healing Restraints  Patient is exhibiting these behaviors:  Sedation needed so patient is unable to pull lines (mathews cathter, ETT,)   Reason for Restraints: Interference with medical treatment,     Restraint order was obtained YES .  Date and time of initial order: 1/22/2020 1800  The length of the order is 24 hours.  Time order expires is: 1/23/2020 1800      Restraint type: right wrist and left wrist  Supervisor notified that the restaints were started:YES   Patient is Currently restrained.   The time restraints were placed: 1800  Documentation started in Doc Flowsheet started at: YES  Care Plan Individualized: Yes    Eula Thompson RN

## 2020-01-23 NOTE — DISCHARGE SUMMARY
[facility]  Hospitalist Discharge/Death Summary       Date of Admission:  1/21/2020  Date of Discharge:  1/23/2020  Discharging Provider: Delvin Richards DO      Discharge Diagnoses      Right lower lobe pneumonia/consolodation    Gastric outlet obstruction       The patient was taken for upper scoping to explore possible outlet obstruction seen on CT images. During the procedure, obstruction was confirmed and dilatations took place. The patient then lost oxygen saturations and was intubated. Plain film imaging showed worsening of right lung opacity and no evidence of pneumothorax or abdominal free air.    Despite intubation and ventilation, saturations climbed to normal over many minutes. Pupil exam showed no response to light.     The patient's wife, sister, son, daughter and daughter-in-law were apprised of the hospital course; they had witnessed a rapid decline in his performance status over the last few months.    The family elected comfort measures, but preferred to leave him on the ventilator overnight; however, his heart rate slowly decreased and then went to  asystole  at 12:40 am 1/23/2020 with family members noted above present.

## 2020-01-23 NOTE — DISCHARGE SUMMARY
[facility]  Hospitalist Discharge/Death Summary       Date of Admission:  1/21/2020  Date of Discharge:  1/23/2020  Discharging Provider: Delvin Richards DO      Discharge Diagnoses      Right lower lobe pneumonia/consolodation    Gastric outlet obstruction       The patient was taken for upper scoping to explore possible outlet obstruction seen on CT images. During the procedure, obstruction was confirmed and dilatations took place. The patient then lost oxygen saturations and was intubated. Post procedure plain film imaging showed worsening of right lung opacity and no evidence of pneumothorax or abdominal free air.    Despite intubation and ventilation, saturations only slowly climbed to normal over many minutes. Pupil exam showed no response to light.     The patient's wife, sister, son, daughter and daughter-in-law were apprised of the hospital course; they had witnessed a rapid decline in his performance status over the last few months.    The family elected comfort measures, but preferred to leave him on the ventilator overnight; however, his heart rate slowly decreased and then went to  asystole  at 12:40 am 1/23/2020 with family members noted above present.

## 2020-01-23 NOTE — PLAN OF CARE
Death occurred at 40. RN and provider at bedside to verify.Wife refused to let staff near patient or remove vent until 130. Patient remained on vent and in bilateral soft wrist restraints until 130. Pt's wife did not want autopsy  All monitors and devices removed from patient at 130. Awaiting [place]  home.

## 2020-01-23 NOTE — PLAN OF CARE
Pt now DNR will remain intubated at this time per family request. Will not preform additional interventions. Families wishes are to keep patient comfortable.

## 2020-01-23 NOTE — PLAN OF CARE
Right wrist and Left wrist restraints discontinued at 0130 AM on 1/23/2020.    Restraint discontinue criteria met, patient is calm, cooperative and safe. Restraints removed.     Patient's Response: No evidence of learning  Family Notification: Spouse/significant other  Attending Physician Notified: Yes, Attending Physician's Name: Dr. Maurice Loya RN

## 2020-01-23 NOTE — PROGRESS NOTES
[facility]    Medicine Progress Note - Hospitalist Service       Date of Admission:  1/21/2020  Assessment & Plan      Sister, wife, son, daughter and daughter-in-law apprised of events during this hospitalization. They were aware of his rapid performance decline prior to this hospitalization and understood the large negative impact to that status after becoming hypoxic for several minutes (reason (s) unknown) before and then after intubation during the gastric dilatation procedure today.    The family has elected to make his status DNR and tomorrow, they will consider withdrawing ventilator support.         Diet:  NPO  DVT Prophylaxis: Pneumatic Compression Devices  Joseph Catheter: in place, indication: Retention  Code Status: DNR      Disposition Plan      To be determined after ventilator withdrawal       The patient's care was discussed with the family members    Delvin Richards DO  Hospitalist Service  [facility]    ______________________________________________________________________

## 2020-01-23 NOTE — PLAN OF CARE
Occupational Therapy Discharge Summary    Reason for therapy discharge:    Pt      Progress towards therapy goal(s). See goals on Care Plan in [facility] electronic health record for goal details.  NA    Therapy recommendation(s):    NA

## 2020-01-23 NOTE — PLAN OF CARE
Provider notified around 1940 of  MAPs 58-64 per ART line. CVP initiated reading 6-7. Bolus currently infusing.  Provider updated around 2010 of MAPs mid to low 50's per ART line. Bolus continues to infuse. Provider to beside no new orders at this time. Spouse at bedside waiting for children to arrive.

## 2020-01-23 NOTE — PLAN OF CARE
Patient remains ventilated as charted; assessment performed as charted. Patient VS remain unstable (BP and O2); patient is unresponsive and sedated; wife present. Waiting for family - Dr. Richards aware of Bp's; fluid bolus running. Mottling beginning in BLE. Call light in reach, bed low and locked, video monitoring, patient directly outside of nurse station.     Face to face report given with opportunity to observe patient.    Report given to Liana, RN's    Alma Thompson RN   1/22/2020  7:26 PM

## 2020-01-24 LAB — COPATH REPORT: NORMAL

## 2020-01-27 LAB
BACTERIA SPEC CULT: NORMAL
BACTERIA SPEC CULT: NORMAL
SPECIMEN SOURCE: NORMAL
SPECIMEN SOURCE: NORMAL

## 2022-10-18 NOTE — PROGRESS NOTES
External orthopedics referral to [facility] - Dr. Zafar Berry placed today per patient request.    English

## (undated) DEVICE — NDL-SPINAL 18G X 3.5" QUINCKE

## (undated) DEVICE — SUTURE-VICRYL 2-0 CT-2 J269H

## (undated) DEVICE — BLADE-DISSECTOR AR-8400DS

## (undated) DEVICE — SYRINGE-30CC SLIP TIP

## (undated) DEVICE — DRSG-NON ADHERING 3 X 4 TELFA

## (undated) DEVICE — BIN-ROTATOR CUFF (SHOULDER)

## (undated) DEVICE — Device

## (undated) DEVICE — GLV-8.5 PROTEXIS PI CLASSIC LF/PF

## (undated) DEVICE — NDL-BLUNT FILL 18G 1.5"

## (undated) DEVICE — DRAPE-IOBAN 2 60CM X 60CM

## (undated) DEVICE — SUTURE-MONOCRYL 4-0 PS-2 Y496G

## (undated) DEVICE — BLADE-FLUSH CUT OVAL BURR AR-8550FOE

## (undated) DEVICE — CANISTER-SUCTION 2000CC

## (undated) DEVICE — LABEL-STERILE PREPRINTED FOR OR

## (undated) DEVICE — IRRIGATION-H2O 1000ML

## (undated) DEVICE — IRRIGATION-NACL 3000ML (BAG)

## (undated) DEVICE — SYRINGE-ASEPTO IRRIGATION

## (undated) DEVICE — SUTURE-VICRYL 0 CT-2 J270H

## (undated) DEVICE — FIBERWIRE-BRAIDED #2 W/TAPERED NEEDLE 26.5MM 1/2 CIRCLE

## (undated) DEVICE — TUBING-SUCTION 20FT

## (undated) DEVICE — SYRINGE-10CC LUER LOCK

## (undated) DEVICE — SCD SLEEVE-KNEE REG.

## (undated) DEVICE — IRRIGATION-NACL 1000ML

## (undated) DEVICE — CONNECTOR-ERBEFLO 2 PORT

## (undated) DEVICE — SYRINGE-60CC W/GAUGE

## (undated) DEVICE — TUBING-ARTHROSCOPY-INFLOW

## (undated) DEVICE — BIN-ARTHROSCOPY CART

## (undated) DEVICE — CAUTERY PAD-POLYHESIVE II ADULT

## (undated) DEVICE — BLANKET-BAIR LOWER EXTREMITY

## (undated) DEVICE — SPONGE-LAPAROTOMY PADS 18 X 18

## (undated) DEVICE — TAPE-MEDIPORE 4" X 2YD

## (undated) DEVICE — SLING-ARM-LARGE

## (undated) DEVICE — PACK-SHOULDER ARTHROSCOPY-CUSTOM

## (undated) DEVICE — NEEDLE-SCORPION MULTIFIRE

## (undated) DEVICE — TOWEL-OR DISP 4PKS

## (undated) DEVICE — MOUTHPIECE W/GUARD FOR ENDOSCOPY

## (undated) DEVICE — CAUTERY PENCIL

## (undated) DEVICE — STOCKINETTE IMPERVIOUS-LARGE 9X48

## (undated) DEVICE — STERI-STRIP-1/2" X 4"

## (undated) DEVICE — BIN-MINI, MAXI, MICRO DRIVER BLADES BIN

## (undated) DEVICE — APPLICATOR-CHLORAPREP 26ML TINTED CHG 2%+ 70% IPA-SURGICAL

## (undated) DEVICE — NDL-25G 1-1/2" NON-SAFETY

## (undated) DEVICE — ANTI-FOG AGENT

## (undated) DEVICE — LIGHT HANDLE COVER

## (undated) DEVICE — TUBING-CONNECTING 18" SUCTION

## (undated) DEVICE — LUBRICANT JELLY 2OZ. TUBE

## (undated) DEVICE — BLADE-SCALPEL #15

## (undated) DEVICE — WAND-AMBIENT SUPER TURBOVAC 90 ICW

## (undated) RX ORDER — FENTANYL CITRATE 50 UG/ML
INJECTION, SOLUTION INTRAMUSCULAR; INTRAVENOUS
Status: DISPENSED
Start: 2017-04-21

## (undated) RX ORDER — LIDOCAINE HYDROCHLORIDE 20 MG/ML
INJECTION, SOLUTION EPIDURAL; INFILTRATION; INTRACAUDAL; PERINEURAL
Status: DISPENSED
Start: 2017-04-21

## (undated) RX ORDER — PROPOFOL 10 MG/ML
INJECTION, EMULSION INTRAVENOUS
Status: DISPENSED
Start: 2020-01-01

## (undated) RX ORDER — CLONIDINE HYDROCHLORIDE 0.1 MG/1
TABLET ORAL
Status: DISPENSED
Start: 2019-01-01

## (undated) RX ORDER — PROPOFOL 10 MG/ML
INJECTION, EMULSION INTRAVENOUS
Status: DISPENSED
Start: 2017-04-21

## (undated) RX ORDER — LIDOCAINE HYDROCHLORIDE 20 MG/ML
INJECTION, SOLUTION EPIDURAL; INFILTRATION; INTRACAUDAL; PERINEURAL
Status: DISPENSED
Start: 2020-01-01